# Patient Record
Sex: FEMALE | Race: BLACK OR AFRICAN AMERICAN | Employment: OTHER | URBAN - NONMETROPOLITAN AREA
[De-identification: names, ages, dates, MRNs, and addresses within clinical notes are randomized per-mention and may not be internally consistent; named-entity substitution may affect disease eponyms.]

---

## 2017-01-16 ENCOUNTER — OFFICE VISIT (OUTPATIENT)
Dept: ENDOCRINOLOGY | Age: 57
End: 2017-01-16

## 2017-01-16 VITALS
DIASTOLIC BLOOD PRESSURE: 78 MMHG | RESPIRATION RATE: 18 BRPM | HEART RATE: 100 BPM | WEIGHT: 185.8 LBS | HEIGHT: 66 IN | BODY MASS INDEX: 29.86 KG/M2 | SYSTOLIC BLOOD PRESSURE: 136 MMHG

## 2017-01-16 DIAGNOSIS — E04.2 MULTINODULAR GOITER: Primary | ICD-10-CM

## 2017-01-16 DIAGNOSIS — E11.8 TYPE 2 DIABETES MELLITUS WITH COMPLICATION, WITHOUT LONG-TERM CURRENT USE OF INSULIN (HCC): ICD-10-CM

## 2017-01-16 PROCEDURE — 3014F SCREEN MAMMO DOC REV: CPT | Performed by: CLINICAL NURSE SPECIALIST

## 2017-01-16 PROCEDURE — 3044F HG A1C LEVEL LT 7.0%: CPT | Performed by: CLINICAL NURSE SPECIALIST

## 2017-01-16 PROCEDURE — 99214 OFFICE O/P EST MOD 30 MIN: CPT | Performed by: CLINICAL NURSE SPECIALIST

## 2017-01-16 PROCEDURE — G8598 ASA/ANTIPLAT THER USED: HCPCS | Performed by: CLINICAL NURSE SPECIALIST

## 2017-01-16 PROCEDURE — 4004F PT TOBACCO SCREEN RCVD TLK: CPT | Performed by: CLINICAL NURSE SPECIALIST

## 2017-01-16 PROCEDURE — G8484 FLU IMMUNIZE NO ADMIN: HCPCS | Performed by: CLINICAL NURSE SPECIALIST

## 2017-01-16 PROCEDURE — G8419 CALC BMI OUT NRM PARAM NOF/U: HCPCS | Performed by: CLINICAL NURSE SPECIALIST

## 2017-01-16 PROCEDURE — 3017F COLORECTAL CA SCREEN DOC REV: CPT | Performed by: CLINICAL NURSE SPECIALIST

## 2017-01-16 PROCEDURE — G8427 DOCREV CUR MEDS BY ELIG CLIN: HCPCS | Performed by: CLINICAL NURSE SPECIALIST

## 2017-01-16 ASSESSMENT — ENCOUNTER SYMPTOMS
EYE REDNESS: 0
SHORTNESS OF BREATH: 0
COUGH: 0
BACK PAIN: 1
WHEEZING: 0
NAUSEA: 0
CHEST TIGHTNESS: 0
VOICE CHANGE: 0
DIARRHEA: 0
ABDOMINAL PAIN: 0
CONSTIPATION: 0

## 2017-01-19 ENCOUNTER — TELEPHONE (OUTPATIENT)
Dept: ENDOCRINOLOGY | Age: 57
End: 2017-01-19

## 2017-01-27 ENCOUNTER — TELEPHONE (OUTPATIENT)
Dept: ENDOCRINOLOGY | Age: 57
End: 2017-01-27

## 2017-03-08 ENCOUNTER — OFFICE VISIT (OUTPATIENT)
Dept: ENDOCRINOLOGY | Age: 57
End: 2017-03-08

## 2017-03-08 VITALS
SYSTOLIC BLOOD PRESSURE: 120 MMHG | DIASTOLIC BLOOD PRESSURE: 70 MMHG | HEART RATE: 86 BPM | RESPIRATION RATE: 18 BRPM | WEIGHT: 188.5 LBS | BODY MASS INDEX: 30.29 KG/M2 | HEIGHT: 66 IN

## 2017-03-08 DIAGNOSIS — E04.2 MULTINODULAR GOITER: ICD-10-CM

## 2017-03-08 DIAGNOSIS — I10 ESSENTIAL HYPERTENSION: ICD-10-CM

## 2017-03-08 DIAGNOSIS — E78.00 HIGH CHOLESTEROL: ICD-10-CM

## 2017-03-08 DIAGNOSIS — E11.9 TYPE 2 DIABETES MELLITUS WITHOUT COMPLICATION, WITHOUT LONG-TERM CURRENT USE OF INSULIN (HCC): Primary | ICD-10-CM

## 2017-03-08 PROCEDURE — 99214 OFFICE O/P EST MOD 30 MIN: CPT | Performed by: INTERNAL MEDICINE

## 2017-03-08 PROCEDURE — 3014F SCREEN MAMMO DOC REV: CPT | Performed by: INTERNAL MEDICINE

## 2017-03-08 PROCEDURE — G8598 ASA/ANTIPLAT THER USED: HCPCS | Performed by: INTERNAL MEDICINE

## 2017-03-08 PROCEDURE — G8417 CALC BMI ABV UP PARAM F/U: HCPCS | Performed by: INTERNAL MEDICINE

## 2017-03-08 PROCEDURE — G8427 DOCREV CUR MEDS BY ELIG CLIN: HCPCS | Performed by: INTERNAL MEDICINE

## 2017-03-08 PROCEDURE — G8484 FLU IMMUNIZE NO ADMIN: HCPCS | Performed by: INTERNAL MEDICINE

## 2017-03-08 PROCEDURE — 3044F HG A1C LEVEL LT 7.0%: CPT | Performed by: INTERNAL MEDICINE

## 2017-03-08 PROCEDURE — 4004F PT TOBACCO SCREEN RCVD TLK: CPT | Performed by: INTERNAL MEDICINE

## 2017-03-08 PROCEDURE — 3017F COLORECTAL CA SCREEN DOC REV: CPT | Performed by: INTERNAL MEDICINE

## 2017-03-08 ASSESSMENT — ENCOUNTER SYMPTOMS: BLURRED VISION: 0

## 2017-05-23 ENCOUNTER — OFFICE VISIT (OUTPATIENT)
Dept: CARDIOLOGY | Age: 57
End: 2017-05-23

## 2017-05-23 VITALS
HEIGHT: 66 IN | HEART RATE: 99 BPM | SYSTOLIC BLOOD PRESSURE: 130 MMHG | BODY MASS INDEX: 29.81 KG/M2 | WEIGHT: 185.5 LBS | DIASTOLIC BLOOD PRESSURE: 92 MMHG

## 2017-05-23 DIAGNOSIS — R07.89 ATYPICAL CHEST PAIN: ICD-10-CM

## 2017-05-23 DIAGNOSIS — R06.02 SOB (SHORTNESS OF BREATH): Primary | ICD-10-CM

## 2017-05-23 PROCEDURE — G8417 CALC BMI ABV UP PARAM F/U: HCPCS | Performed by: INTERNAL MEDICINE

## 2017-05-23 PROCEDURE — G8598 ASA/ANTIPLAT THER USED: HCPCS | Performed by: INTERNAL MEDICINE

## 2017-05-23 PROCEDURE — 4004F PT TOBACCO SCREEN RCVD TLK: CPT | Performed by: INTERNAL MEDICINE

## 2017-05-23 PROCEDURE — 3014F SCREEN MAMMO DOC REV: CPT | Performed by: INTERNAL MEDICINE

## 2017-05-23 PROCEDURE — 93000 ELECTROCARDIOGRAM COMPLETE: CPT | Performed by: INTERNAL MEDICINE

## 2017-05-23 PROCEDURE — G8428 CUR MEDS NOT DOCUMENT: HCPCS | Performed by: INTERNAL MEDICINE

## 2017-05-23 PROCEDURE — 3017F COLORECTAL CA SCREEN DOC REV: CPT | Performed by: INTERNAL MEDICINE

## 2017-05-23 PROCEDURE — 99213 OFFICE O/P EST LOW 20 MIN: CPT | Performed by: INTERNAL MEDICINE

## 2017-05-23 RX ORDER — CYCLOSPORINE 0.5 MG/ML
1 EMULSION OPHTHALMIC 2 TIMES DAILY
COMMUNITY
End: 2019-07-16 | Stop reason: ALTCHOICE

## 2017-09-18 ENCOUNTER — OFFICE VISIT (OUTPATIENT)
Dept: ENDOCRINOLOGY | Age: 57
End: 2017-09-18
Payer: MEDICARE

## 2017-09-18 VITALS
BODY MASS INDEX: 30.02 KG/M2 | HEART RATE: 89 BPM | SYSTOLIC BLOOD PRESSURE: 126 MMHG | RESPIRATION RATE: 16 BRPM | WEIGHT: 186.8 LBS | DIASTOLIC BLOOD PRESSURE: 59 MMHG | HEIGHT: 66 IN

## 2017-09-18 DIAGNOSIS — E04.2 MULTINODULAR GOITER: ICD-10-CM

## 2017-09-18 DIAGNOSIS — E78.2 MIXED HYPERLIPIDEMIA: ICD-10-CM

## 2017-09-18 DIAGNOSIS — E11.8 TYPE 2 DIABETES MELLITUS WITH COMPLICATION, WITHOUT LONG-TERM CURRENT USE OF INSULIN (HCC): Primary | ICD-10-CM

## 2017-09-18 PROCEDURE — G8417 CALC BMI ABV UP PARAM F/U: HCPCS | Performed by: CLINICAL NURSE SPECIALIST

## 2017-09-18 PROCEDURE — 3017F COLORECTAL CA SCREEN DOC REV: CPT | Performed by: CLINICAL NURSE SPECIALIST

## 2017-09-18 PROCEDURE — 3014F SCREEN MAMMO DOC REV: CPT | Performed by: CLINICAL NURSE SPECIALIST

## 2017-09-18 PROCEDURE — 4004F PT TOBACCO SCREEN RCVD TLK: CPT | Performed by: CLINICAL NURSE SPECIALIST

## 2017-09-18 PROCEDURE — G8598 ASA/ANTIPLAT THER USED: HCPCS | Performed by: CLINICAL NURSE SPECIALIST

## 2017-09-18 PROCEDURE — 99213 OFFICE O/P EST LOW 20 MIN: CPT | Performed by: CLINICAL NURSE SPECIALIST

## 2017-09-18 PROCEDURE — G8427 DOCREV CUR MEDS BY ELIG CLIN: HCPCS | Performed by: CLINICAL NURSE SPECIALIST

## 2017-09-18 PROCEDURE — 3046F HEMOGLOBIN A1C LEVEL >9.0%: CPT | Performed by: CLINICAL NURSE SPECIALIST

## 2017-09-18 ASSESSMENT — ENCOUNTER SYMPTOMS
CHEST TIGHTNESS: 0
NAUSEA: 0
DIARRHEA: 0
VOICE CHANGE: 0
SHORTNESS OF BREATH: 0
TROUBLE SWALLOWING: 1
ABDOMINAL PAIN: 0
WHEEZING: 0
COUGH: 0
CONSTIPATION: 0
EYE REDNESS: 0

## 2017-09-27 ENCOUNTER — HOSPITAL ENCOUNTER (OUTPATIENT)
Age: 57
Discharge: HOME | End: 2017-09-27
Payer: MEDICARE

## 2017-09-27 ENCOUNTER — HOSPITAL ENCOUNTER (OUTPATIENT)
Dept: GENERAL RADIOLOGY | Age: 57
Discharge: HOME | End: 2017-09-27
Payer: MEDICARE

## 2017-09-27 DIAGNOSIS — S83.222S: ICD-10-CM

## 2017-09-27 PROCEDURE — 73564 X-RAY EXAM KNEE 4 OR MORE: CPT

## 2017-10-24 ENCOUNTER — APPOINTMENT (OUTPATIENT)
Dept: CT IMAGING | Age: 57
End: 2017-10-24
Payer: MEDICARE

## 2017-10-24 ENCOUNTER — HOSPITAL ENCOUNTER (EMERGENCY)
Age: 57
Discharge: HOME | End: 2017-10-25
Attending: FAMILY MEDICINE
Payer: MEDICARE

## 2017-10-24 ENCOUNTER — NURSE TRIAGE (OUTPATIENT)
Dept: ADMINISTRATIVE | Age: 57
End: 2017-10-24

## 2017-10-24 DIAGNOSIS — R04.0 EPISTAXIS: Primary | ICD-10-CM

## 2017-10-24 LAB
BASOPHILS # BLD: 1.1 %
BASOPHILS ABSOLUTE: 0.1 THOU/MM3 (ref 0–0.1)
EOSINOPHIL # BLD: 3.3 %
EOSINOPHILS ABSOLUTE: 0.4 THOU/MM3 (ref 0–0.4)
HCT VFR BLD CALC: 42.4 % (ref 37–47)
HEMOGLOBIN: 14.5 GM/DL (ref 12–16)
LYMPHOCYTES # BLD: 30.8 %
LYMPHOCYTES ABSOLUTE: 3.5 THOU/MM3 (ref 1–4.8)
MCH RBC QN AUTO: 31.9 PG (ref 27–31)
MCHC RBC AUTO-ENTMCNC: 34.2 GM/DL (ref 33–37)
MCV RBC AUTO: 93.1 FL (ref 81–99)
MONOCYTES # BLD: 8.5 %
MONOCYTES ABSOLUTE: 1 THOU/MM3 (ref 0.4–1.3)
NUCLEATED RED BLOOD CELLS: 0 /100 WBC
PDW BLD-RTO: 12.8 % (ref 11.5–14.5)
PLATELET # BLD: 267 THOU/MM3 (ref 130–400)
PMV BLD AUTO: 8.9 MCM (ref 7.4–10.4)
RBC # BLD: 4.56 MILL/MM3 (ref 4.2–5.4)
SEG NEUTROPHILS: 56.3 %
SEGMENTED NEUTROPHILS ABSOLUTE COUNT: 6.5 THOU/MM3 (ref 1.8–7.7)
WBC # BLD: 11.5 THOU/MM3 (ref 4.8–10.8)

## 2017-10-24 PROCEDURE — 36415 COLL VENOUS BLD VENIPUNCTURE: CPT

## 2017-10-24 PROCEDURE — 6370000000 HC RX 637 (ALT 250 FOR IP): Performed by: FAMILY MEDICINE

## 2017-10-24 PROCEDURE — 70450 CT HEAD/BRAIN W/O DYE: CPT

## 2017-10-24 PROCEDURE — 85025 COMPLETE CBC W/AUTO DIFF WBC: CPT

## 2017-10-24 PROCEDURE — 30901 CONTROL OF NOSEBLEED: CPT

## 2017-10-24 PROCEDURE — 99284 EMERGENCY DEPT VISIT MOD MDM: CPT

## 2017-10-24 RX ORDER — CLONIDINE HYDROCHLORIDE 0.1 MG/1
0.1 TABLET ORAL ONCE
Status: DISCONTINUED | OUTPATIENT
Start: 2017-10-24 | End: 2017-10-25 | Stop reason: HOSPADM

## 2017-10-24 RX ADMIN — PHENYLEPHRINE HYDROCHLORIDE 1 SPRAY: 1 SPRAY NASAL at 23:41

## 2017-10-24 ASSESSMENT — ENCOUNTER SYMPTOMS
DIARRHEA: 0
BACK PAIN: 0
SINUS PAIN: 0
SINUS PRESSURE: 0
SHORTNESS OF BREATH: 0
VOMITING: 0
PHOTOPHOBIA: 0
RHINORRHEA: 1
NAUSEA: 0
CHEST TIGHTNESS: 0

## 2017-10-24 ASSESSMENT — PAIN DESCRIPTION - FREQUENCY
FREQUENCY: OTHER (COMMENT)
FREQUENCY: CONTINUOUS

## 2017-10-24 ASSESSMENT — PAIN DESCRIPTION - ONSET: ONSET: ON-GOING

## 2017-10-24 ASSESSMENT — PAIN DESCRIPTION - DESCRIPTORS
DESCRIPTORS: CONSTANT;ACHING;DULL
DESCRIPTORS: DISCOMFORT

## 2017-10-24 ASSESSMENT — PAIN DESCRIPTION - PROGRESSION: CLINICAL_PROGRESSION: NOT CHANGED

## 2017-10-24 ASSESSMENT — PAIN DESCRIPTION - PAIN TYPE
TYPE: ACUTE PAIN
TYPE: ACUTE PAIN

## 2017-10-24 ASSESSMENT — PAIN DESCRIPTION - LOCATION
LOCATION: NOSE
LOCATION: HEAD

## 2017-10-24 ASSESSMENT — PAIN DESCRIPTION - ORIENTATION: ORIENTATION: RIGHT

## 2017-10-24 ASSESSMENT — PAIN SCALES - GENERAL
PAINLEVEL_OUTOF10: 6
PAINLEVEL_OUTOF10: 5

## 2017-10-25 VITALS
WEIGHT: 185 LBS | RESPIRATION RATE: 16 BRPM | SYSTOLIC BLOOD PRESSURE: 141 MMHG | HEART RATE: 75 BPM | HEIGHT: 66 IN | BODY MASS INDEX: 29.73 KG/M2 | DIASTOLIC BLOOD PRESSURE: 68 MMHG | TEMPERATURE: 97.6 F | OXYGEN SATURATION: 96 %

## 2017-10-25 ASSESSMENT — PAIN DESCRIPTION - ORIENTATION
ORIENTATION: RIGHT
ORIENTATION: RIGHT

## 2017-10-25 ASSESSMENT — PAIN DESCRIPTION - ONSET
ONSET: ON-GOING
ONSET: ON-GOING

## 2017-10-25 ASSESSMENT — PAIN DESCRIPTION - DESCRIPTORS
DESCRIPTORS: DISCOMFORT
DESCRIPTORS: DISCOMFORT

## 2017-10-25 ASSESSMENT — PAIN DESCRIPTION - PAIN TYPE
TYPE: ACUTE PAIN
TYPE: ACUTE PAIN

## 2017-10-25 ASSESSMENT — PAIN DESCRIPTION - LOCATION
LOCATION: NOSE
LOCATION: NOSE

## 2017-10-25 ASSESSMENT — PAIN DESCRIPTION - PROGRESSION
CLINICAL_PROGRESSION: NOT CHANGED
CLINICAL_PROGRESSION: GRADUALLY IMPROVING

## 2017-10-25 ASSESSMENT — PAIN DESCRIPTION - FREQUENCY: FREQUENCY: CONTINUOUS

## 2017-10-25 ASSESSMENT — PAIN SCALES - GENERAL
PAINLEVEL_OUTOF10: 5
PAINLEVEL_OUTOF10: 3

## 2017-10-25 NOTE — ED NOTES
Dr. Segovia Lias leaving room when I entered. Pt up walking around room to make sure her nose wont start bleeding once she gets up and moves around. Pt states she is ready to get home and get some sleep. Dr. Segovia Lias back in room to reassess epistaxis and confirm plan for discharge.       Gin Ying RN  10/25/17 0554

## 2017-10-25 NOTE — ED PROVIDER NOTES
[facility]  eMERGENCY dEPARTMENT eNCOUnter          CHIEF COMPLAINT       Chief Complaint   Patient presents with    Epistaxis       Nurses Notes reviewed and I agree except as noted in the HPI. HISTORY OF PRESENT ILLNESS    Lio Hernandez is a 61 y.o. female who presents to the Emergency Department for the evaluation of epistaxis. The patient reports that her first episode of epistaxis occurred yesterday without known injury and resolved on its own. Patient then developed another nosebleed approximately seven hours PTA but was able to stop it with sticking tissue coated in Vaseline in her nostrils. She later developed another nose bleed that temporarily went away with placing her head between her knees with her nose pinched but started bleeding again as she was walking around getting ready to go to sleep. Upon arrival to the ED, the patient's nosebleed has resolved. Patient complains of a diffuse headache. Her pain is described as constant throbbing that she currently rates as a 5/10 in severity without modifying factors. She adds that she has seasonal allergies and has had rhinorrhea for the past week. The patient denies any dizziness, lightheadedness, congestion, ear pain or discharge, or sinus pressure. Patient denies any similar episodes of epistaxis in the past. No further complaints at initial time of encounter. The HPI was provided by the patient. REVIEW OF SYSTEMS     Review of Systems   Constitutional: Negative for chills, fatigue and fever. HENT: Positive for nosebleeds and rhinorrhea. Negative for congestion, ear discharge, ear pain, hearing loss, sinus pain, sinus pressure and tinnitus. Eyes: Negative for photophobia and visual disturbance. Respiratory: Negative for chest tightness and shortness of breath. Cardiovascular: Negative for chest pain. Gastrointestinal: Negative for diarrhea, nausea and vomiting.    Musculoskeletal: Negative for back pain, neck pain and neck stiffness. Allergic/Immunologic: Positive for environmental allergies. Negative for immunocompromised state. Neurological: Positive for headaches. Negative for dizziness, seizures, syncope, weakness, light-headedness and numbness. Hematological: Does not bruise/bleed easily. Psychiatric/Behavioral: Negative for confusion. PAST MEDICAL HISTORY    has a past medical history of Arthritis; Asthma; Atypical chest pain; Bell's palsy; COPD (chronic obstructive pulmonary disease) ([facility] [address] Costochondritis; Diabetes mellitus ([facility] [address] Fatigue; FH: CAD (coronary artery disease); High blood pressure; High cholesterol; Joint pain; Nonobstructive CAD (coronary artery disease); Pseudoaneurysm ([facility] [address] Tobacco abuse; and Wheezing. SURGICAL HISTORY      has a past surgical history that includes partial hysterectomy (cervix not removed) (1999); Rotator cuff repair (Bilateral, 2007); cardiovascular stress test (10 02 2009); doppler echocardiography (10 02 2009); Diagnostic Cardiac Cath Lab Procedure (10 12 2009); and back surgery (12/05/2016). CURRENT MEDICATIONS       Discharge Medication List as of 10/25/2017  2:10 AM      CONTINUE these medications which have NOT CHANGED    Details   glucose blood VI test strips (FREESTYLE LITE) strip Disp-90 strip, R-1, NormalTesting once daily      MELOXICAM PO Take by mouth dailyHistorical Med      cycloSPORINE (RESTASIS) 0.05 % ophthalmic emulsion 1 drop 2 times dailyHistorical Med      metoprolol (TOPROL-XL) 25 MG XL tablet Take 25 mg by mouth See Admin Instructions Take 1/2 in the morning and 1 1/2 at night      Potassium (POTASSIMIN PO) Take 10 mEq by mouth daily. tramadol (ULTRAM) 50 MG tablet Take 50 mg by mouth every 6 hours as needed. amlodipine (NORVASC) 5 MG tablet Take 5 mg by mouth daily. sitagliptan-metformin (JANUMET)  MG per tablet Take 1 tablet by mouth 2 times daily.         pravastatin (PRAVACHOL) 20 MG tablet Take 20 mg by mouth daily displays no seizure activity. GCS eye subscore is 4. GCS verbal subscore is 5. GCS motor subscore is 6. Skin: Skin is warm and dry. No rash noted. She is not diaphoretic. Psychiatric: She has a normal mood and affect. Her behavior is normal. Thought content normal.       DIFFERENTIAL DIAGNOSIS:   Epistaxis related to sinusitis or allergies    DIAGNOSTIC RESULTS     EKG: All EKG's are interpreted by the Emergency Department Physician who either signs or Co-signs this chart in the absence of a cardiologist.  None    RADIOLOGY: non-plain film images(s) such as CT, Ultrasound and MRI are read by the radiologist.  [address]   Final Result   No mass effect or acute hemorrhage. **This report has been created using voice recognition software. It may contain minor errors which are inherent in voice recognition technology. **      Final report electronically signed by Dr. Vipin Lamb on 10/24/2017 11:46 PM          LABS:   Labs Reviewed   CBC WITH AUTO DIFFERENTIAL - Abnormal; Notable for the following:        Result Value    WBC 11.5 (*)     MCH 31.9 (*)     All other components within normal limits       EMERGENCY DEPARTMENT COURSE:   Vitals:    Vitals:    10/24/17 2342 10/24/17 2343 10/25/17 0118 10/25/17 0211   BP: 119/82 119/82 (!) 143/71 (!) 141/68   Pulse:  84 78 75   Resp:  14 14 16   Temp:       TempSrc:   Oral    SpO2:  96% 98% 96%   Weight:       Height:         10:48 PM: The patient was seen and evaluated. Patient seen and evaluated for epistaxis of the right nares. I did cauterize and apply Moose-Synephrine. At this time there is no more active bleeding. Patient blood pressure was controlled. Screening labs and imaging studies are reassuring  Discharge home with ENT follow-up. CRITICAL CARE:   None.      CONSULTS:  none    PROCEDURES:  Using silver nitrate cauterized right nares    FINAL IMPRESSION      1. Epistaxis          DISPOSITION/PLAN   The patient was Discharged    PATIENT REFERRED TO:  Lizzie Mckeon MD  [address] [facility] [address] [place]  [phone]    Schedule an appointment as soon as possible for a visit today        DISCHARGE MEDICATIONS:  Discharge Medication List as of 10/25/2017  2:10 AM          (Please note that portions of this note were completed with a voice recognition program.  Efforts were made to edit the dictations but occasionally words are mis-transcribed.)    Scribe:  [address] 10/25/17 10:48 PM Scribing for and in the presence of Minnie Abbott MD.    Signed by: [address], Vel, 10/24/17 2:38 AM    Provider:  I personally performed the services described in the documentation, reviewed and edited the documentation which was dictated to the scribe in my presence, and it accurately records my words and actions.     Minnie Abbott MD 10/25/17 2:38 AM                Minnie Abbott MD  10/25/17 9818

## 2017-10-25 NOTE — ED TRIAGE NOTES
Pt presents in ED with intermittent nosebleed that began yesterday. Pt states she was able to get it stopped last night but it started again this evening at 1500. Pt states she started cleaning and her nose began to bleed. Pt currently does not have an active nosebleed but does have a tissue with a small amount of blood present. VS and assessment competed on arrival. Will continue to monitor.

## 2017-10-26 ENCOUNTER — OFFICE VISIT (OUTPATIENT)
Dept: ENT CLINIC | Age: 57
End: 2017-10-26
Payer: MEDICARE

## 2017-10-26 VITALS
HEIGHT: 66 IN | BODY MASS INDEX: 29.97 KG/M2 | SYSTOLIC BLOOD PRESSURE: 134 MMHG | TEMPERATURE: 97.6 F | HEART RATE: 88 BPM | DIASTOLIC BLOOD PRESSURE: 70 MMHG | WEIGHT: 186.5 LBS | RESPIRATION RATE: 14 BRPM

## 2017-10-26 DIAGNOSIS — R04.0 EPISTAXIS: Primary | ICD-10-CM

## 2017-10-26 PROCEDURE — 3014F SCREEN MAMMO DOC REV: CPT | Performed by: PHYSICIAN ASSISTANT

## 2017-10-26 PROCEDURE — 3017F COLORECTAL CA SCREEN DOC REV: CPT | Performed by: PHYSICIAN ASSISTANT

## 2017-10-26 PROCEDURE — G8484 FLU IMMUNIZE NO ADMIN: HCPCS | Performed by: PHYSICIAN ASSISTANT

## 2017-10-26 PROCEDURE — G8427 DOCREV CUR MEDS BY ELIG CLIN: HCPCS | Performed by: PHYSICIAN ASSISTANT

## 2017-10-26 PROCEDURE — 30901 CONTROL OF NOSEBLEED: CPT | Performed by: PHYSICIAN ASSISTANT

## 2017-10-26 PROCEDURE — G8598 ASA/ANTIPLAT THER USED: HCPCS | Performed by: PHYSICIAN ASSISTANT

## 2017-10-26 PROCEDURE — G8417 CALC BMI ABV UP PARAM F/U: HCPCS | Performed by: PHYSICIAN ASSISTANT

## 2017-10-26 PROCEDURE — 99203 OFFICE O/P NEW LOW 30 MIN: CPT | Performed by: PHYSICIAN ASSISTANT

## 2017-10-26 PROCEDURE — 4004F PT TOBACCO SCREEN RCVD TLK: CPT | Performed by: PHYSICIAN ASSISTANT

## 2017-10-26 ASSESSMENT — ENCOUNTER SYMPTOMS
EYE ITCHING: 0
SORE THROAT: 0
CHEST TIGHTNESS: 0
APNEA: 0
PHOTOPHOBIA: 0
CHOKING: 0
VOICE CHANGE: 0
CONSTIPATION: 0
VOMITING: 0
ABDOMINAL DISTENTION: 0
EYE REDNESS: 0
SHORTNESS OF BREATH: 0
FACIAL SWELLING: 0
WHEEZING: 1
BACK PAIN: 1
SINUS PRESSURE: 0
ANAL BLEEDING: 0
DIARRHEA: 0
TROUBLE SWALLOWING: 0
EYE DISCHARGE: 0
COUGH: 0
COLOR CHANGE: 0
RECTAL PAIN: 0
EYE PAIN: 0
RHINORRHEA: 0
ABDOMINAL PAIN: 0
STRIDOR: 0
NAUSEA: 0
BLOOD IN STOOL: 0

## 2017-10-26 NOTE — PROGRESS NOTES
[address] [facility]  [address] [facility]  [address]  Dept: [phone]  Dept Fax: [phone]  Loc: [phone]      Gretchen Pulido is a 61 y.o. female who was referred by No ref. provider found for:  Chief Complaint   Patient presents with    Epistaxis     New patient is here for epistaxis seen in the ER. pt stated she had a nosebleed this morning   . HPI:     Clemente Teran presents to the clinic with complaints of a nosebleed that brought her to the ER 3 days ago. She has never had nosebleeds in the past. She takes 81mg ASA daily for angina. She did have a right sided cauterization in the ER. No packing was needed a the time. She has had one small nose bleed since her ER visit. This occurred this morning and she was able to get it to stop on her own. She has had URI symptoms over the last few weeks. Patient Active Problem List   Diagnosis    Nonobstructive CAD (coronary artery disease)    Asthma    Diabetes mellitus (HCC)    High cholesterol    High blood pressure    Wheezing    Fatigue    Joint pain    Arthritis    FH: CAD (coronary artery disease)    Tobacco abuse    Pseudoaneurysm (HCC)    Atypical chest pain    Costochondritis    Bell's palsy    Neck pain    Thyroid nodule    Nicotine abuse    SOB (shortness of breath)    Preop cardiovascular exam    Multinodular goiter     Allergies   Allergen Reactions    Morphine      Past Medical History:   Diagnosis Date    Arthritis     Asthma     Atypical chest pain     Atypical chest discomfort in hospital multiple times with stress test being normal.     Bell's palsy     COPD (chronic obstructive pulmonary disease) (HCC)     Costochondritis     Diabetes mellitus (HCC)     Fatigue     FH: CAD (coronary artery disease)     Strong family history of CAD.     High blood pressure     High cholesterol     Joint pain     Nonobstructive CAD (coronary artery disease)     Pseudoaneurysm ([address] Patient developed small pseudoaneurysm in right groin requiring thrombin injection by Dr. Tanner Chino which was successful.  Tobacco abuse     Tobacco abuse on Habitrol patch.  Wheezing      Past Surgical History:   Procedure Laterality Date    BACK SURGERY  12/05/2016    CARDIOVASCULAR STRESS TEST  10 02 2009    Gated SPECT LV function demonstrated normal thickening, normal contractility, normal wall motion, EF of 46% which is low normal; however, visualized by myself appears to be 55%. No evidence of prior transmural MI or ischemia. Excellend treadmill exercise. Completed 10 METS, functional class I. No evidence of ischemic -induced EKG changes are noted. Appropriate hemodynamic response to exercise.  DIAGNOSTIC CARDIAC CATH LAB PROCEDURE  10 12 2009    LV demonstrates normal systolic function, EF 16%. No critical lesions are noted in all larger pericardial vessels & show that left main is widely patent. Circumflex is widely patent with anterior marginal one & two being widely patent. Left anterior descending artery is widely patent, diagonal one large caliber vessel, widely patent. Right coronary artery is widely patent & is dominant.  DOPPLER ECHOCARDIOGRAPHY  10 02 2009    LV demonstrated normal thickening, normal contractility, normal motion with mild systolic dilatation of the ventricle noted. EF 60%. Left atrium is mildly dilated. There is stage I diastolic dysfunction.  PARTIAL HYSTERECTOMY  1999    ROTATOR CUFF REPAIR Bilateral 2007         Subjective:      Review of Systems   Constitutional: Negative for activity change, appetite change, chills, diaphoresis, fatigue, fever and unexpected weight change. HENT: Positive for nosebleeds and postnasal drip. Negative for congestion, dental problem, drooling, ear discharge, ear pain, facial swelling, hearing loss, mouth sores, rhinorrhea, sinus pressure, sneezing, sore throat, tinnitus, trouble swallowing and voice change.     Eyes: Negative for photophobia, pain, discharge, redness, itching and visual disturbance. Respiratory: Positive for wheezing. Negative for apnea, cough, choking, chest tightness, shortness of breath and stridor. Cardiovascular: Negative for chest pain, palpitations and leg swelling. Gastrointestinal: Negative for abdominal distention, abdominal pain, anal bleeding, blood in stool, constipation, diarrhea, nausea, rectal pain and vomiting. Endocrine: Negative for cold intolerance, heat intolerance, polydipsia, polyphagia and polyuria. Genitourinary: Negative for decreased urine volume, difficulty urinating, dyspareunia, dysuria, enuresis, flank pain, frequency, genital sores, hematuria, menstrual problem, pelvic pain, urgency, vaginal bleeding, vaginal discharge and vaginal pain. Musculoskeletal: Positive for arthralgias, back pain and gait problem. Negative for joint swelling, myalgias, neck pain and neck stiffness. Skin: Negative for color change, pallor, rash and wound. Allergic/Immunologic: Negative for environmental allergies, food allergies and immunocompromised state. Neurological: Negative for dizziness, tremors, seizures, syncope, facial asymmetry, speech difficulty, weakness, light-headedness, numbness and headaches. Hematological: Negative for adenopathy. Does not bruise/bleed easily. Psychiatric/Behavioral: Negative for agitation, behavioral problems, confusion, decreased concentration, dysphoric mood, hallucinations, self-injury, sleep disturbance and suicidal ideas. The patient is not nervous/anxious and is not hyperactive. Objective:     Vitals:    10/26/17 1257   BP: 134/70   Site: Left Arm   Position: Sitting   Pulse: 88   Resp: 14   Temp: 97.6 °F (36.4 °C)   TempSrc: Oral   Weight: 186 lb 8 oz (84.6 kg)   Height: 5' 6\" (1.676 m)       Physical Exam   Physical Exam    Head is normocephalic. DANIEL, EOM full, no diplopia, no nystagmus. Conjunctivae pink, moist, no discharge.     Pinnae are WNL  R External auditory canal clear and free of any pathology  L External auditory canal clear and free of any pathology   Tympanic membranes:  R normal  L normal    Nasal bones: intact  Septum:  Small anterior vessel requiring cauterization. Area localized and cauterized without complication. Mucosa: bleeding  Turbinates: Right inferior turbinate erythematous and bleeding. Cauterized as well   Discharge: blood tinged    Lips, tongue and oral cavity show tongue is midline, mobile, no lesions. Dentition: good, no malocclusion  Oral mucosa: normal  Tonsils: normal  Oropharynx: normal-appearing mucosa and no pharyngitis, no exudate  Uvula midline. Gag reflex present  Nasopharynx: not seen    No facial redness, swelling or tenderness. Salivary glands not enlarged. Neck supple  Cervical adenopathy: no palpable lymphadenopathy    Trachea midline  Thyroid not enlarged, no palpable nodules or masses    Chest equal and symmetrical expansion, no retractions    Extremities: no clubbing, cyanosis or edema  Gait steady  Skin: normal exposed surfaces  Cranial nerves grossly intact      Data:  All of the past medical history, past surgical history, family history, social history, allergies and current medications were reviewed. Assessment:   Assessment   1. Epistaxis  48035 - RI CTRL NOSEBLEED,ANTER,SIMPLE          Plan:      PROCEDURE: CONTROL ANTERIOR EPISTAXIS    After an adequate level of topical anesthesia was obtained using a pledget impregnated with Afrin and Lidoocaine, Silver Nitrate cautery was applied to the bleeding point on the Right side. Excess Silver Nitrate was removed with a moistened Q-tip. No packing was needed. Patient tolerated the procedure well. She had 2 areas that were bleeding; the right inferior turbinate and the right anterior septum. She had no bleeding after cauterization. I discussed to heavy, lifting, blowing nose, and sticking nothing in the nose. She verbalized understanding. She will apply Bactroban to the right nare using a Q-tip 2x daily until I see her back in 3 weeks time. She will discuss with Dr. Oliva Chen if she can be off of her ASA. Medications Ordered:  Orders Placed This Encounter   Medications    mupirocin (BACTROBAN) 2 % ointment     Sig: Apply 3 times daily. Dispense:  1 Tube     Refill:  0       Orders Placed:  Orders Placed This Encounter   Procedures    99570 - CO CTRL Elina Anthony       Return in about 3 weeks (around 11/16/2017) for follow-up.          Electronically signed by RIGOBERTO Gordon on 10/26/2017 at 1:32 PM

## 2017-10-27 ENCOUNTER — TELEPHONE (OUTPATIENT)
Dept: CARDIOLOGY CLINIC | Age: 57
End: 2017-10-27

## 2017-11-16 ENCOUNTER — OFFICE VISIT (OUTPATIENT)
Dept: ENT CLINIC | Age: 57
End: 2017-11-16
Payer: MEDICARE

## 2017-11-16 VITALS
HEIGHT: 66 IN | HEART RATE: 80 BPM | RESPIRATION RATE: 14 BRPM | DIASTOLIC BLOOD PRESSURE: 86 MMHG | WEIGHT: 187 LBS | SYSTOLIC BLOOD PRESSURE: 140 MMHG | TEMPERATURE: 98.7 F | BODY MASS INDEX: 30.05 KG/M2

## 2017-11-16 DIAGNOSIS — R04.0 EPISTAXIS: Primary | ICD-10-CM

## 2017-11-16 PROCEDURE — G8484 FLU IMMUNIZE NO ADMIN: HCPCS | Performed by: PHYSICIAN ASSISTANT

## 2017-11-16 PROCEDURE — 3014F SCREEN MAMMO DOC REV: CPT | Performed by: PHYSICIAN ASSISTANT

## 2017-11-16 PROCEDURE — G8598 ASA/ANTIPLAT THER USED: HCPCS | Performed by: PHYSICIAN ASSISTANT

## 2017-11-16 PROCEDURE — G8417 CALC BMI ABV UP PARAM F/U: HCPCS | Performed by: PHYSICIAN ASSISTANT

## 2017-11-16 PROCEDURE — 3017F COLORECTAL CA SCREEN DOC REV: CPT | Performed by: PHYSICIAN ASSISTANT

## 2017-11-16 PROCEDURE — 99213 OFFICE O/P EST LOW 20 MIN: CPT | Performed by: PHYSICIAN ASSISTANT

## 2017-11-16 PROCEDURE — 4004F PT TOBACCO SCREEN RCVD TLK: CPT | Performed by: PHYSICIAN ASSISTANT

## 2017-11-16 PROCEDURE — G8427 DOCREV CUR MEDS BY ELIG CLIN: HCPCS | Performed by: PHYSICIAN ASSISTANT

## 2017-11-16 ASSESSMENT — ENCOUNTER SYMPTOMS
VOICE CHANGE: 0
BLOOD IN STOOL: 0
FACIAL SWELLING: 0
ABDOMINAL DISTENTION: 0
BACK PAIN: 0
DIARRHEA: 0
APNEA: 0
RHINORRHEA: 1
RECTAL PAIN: 0
SHORTNESS OF BREATH: 0
PHOTOPHOBIA: 0
EYE PAIN: 0
COLOR CHANGE: 0
WHEEZING: 0
NAUSEA: 0
SORE THROAT: 0
TROUBLE SWALLOWING: 0
VOMITING: 0
SINUS PRESSURE: 0
EYE DISCHARGE: 0
EYE ITCHING: 0
CHEST TIGHTNESS: 0
COUGH: 0
CHOKING: 0
CONSTIPATION: 0
ABDOMINAL PAIN: 0
ANAL BLEEDING: 0
STRIDOR: 0
EYE REDNESS: 0

## 2017-11-16 NOTE — PROGRESS NOTES
Past Surgical History:   Procedure Laterality Date    BACK SURGERY  12/05/2016    CARDIOVASCULAR STRESS TEST  10 02 2009    Gated SPECT LV function demonstrated normal thickening, normal contractility, normal wall motion, EF of 46% which is low normal; however, visualized by myself appears to be 55%. No evidence of prior transmural MI or ischemia. Excellend treadmill exercise. Completed 10 METS, functional class I. No evidence of ischemic -induced EKG changes are noted. Appropriate hemodynamic response to exercise.  DIAGNOSTIC CARDIAC CATH LAB PROCEDURE  10 12 2009    LV demonstrates normal systolic function, EF 80%. No critical lesions are noted in all larger pericardial vessels & show that left main is widely patent. Circumflex is widely patent with anterior marginal one & two being widely patent. Left anterior descending artery is widely patent, diagonal one large caliber vessel, widely patent. Right coronary artery is widely patent & is dominant.  DOPPLER ECHOCARDIOGRAPHY  10 02 2009    LV demonstrated normal thickening, normal contractility, normal motion with mild systolic dilatation of the ventricle noted. EF 60%. Left atrium is mildly dilated. There is stage I diastolic dysfunction.  PARTIAL HYSTERECTOMY  1999    ROTATOR CUFF REPAIR Bilateral 2007         Subjective:      Review of Systems   Constitutional: Negative for activity change, appetite change, chills, diaphoresis, fatigue, fever and unexpected weight change. HENT: Positive for congestion and rhinorrhea. Negative for dental problem, drooling, ear discharge, ear pain, facial swelling, hearing loss, mouth sores, nosebleeds, postnasal drip, sinus pressure, sneezing, sore throat, tinnitus, trouble swallowing and voice change. Eyes: Negative for photophobia, pain, discharge, redness, itching and visual disturbance. Respiratory: Negative for apnea, cough, choking, chest tightness, shortness of breath, wheezing and stridor.

## 2017-12-02 ENCOUNTER — HOSPITAL ENCOUNTER (EMERGENCY)
Age: 57
Discharge: HOME | End: 2017-12-02
Attending: FAMILY MEDICINE
Payer: MEDICARE

## 2017-12-02 VITALS
SYSTOLIC BLOOD PRESSURE: 128 MMHG | HEART RATE: 95 BPM | HEIGHT: 66 IN | OXYGEN SATURATION: 94 % | DIASTOLIC BLOOD PRESSURE: 86 MMHG | RESPIRATION RATE: 18 BRPM | WEIGHT: 185 LBS | TEMPERATURE: 97.9 F | BODY MASS INDEX: 29.73 KG/M2

## 2017-12-02 DIAGNOSIS — J44.1 COPD EXACERBATION (HCC): Primary | ICD-10-CM

## 2017-12-02 PROCEDURE — 99283 EMERGENCY DEPT VISIT LOW MDM: CPT

## 2017-12-02 PROCEDURE — 6370000000 HC RX 637 (ALT 250 FOR IP): Performed by: FAMILY MEDICINE

## 2017-12-02 PROCEDURE — 94640 AIRWAY INHALATION TREATMENT: CPT

## 2017-12-02 RX ORDER — GUAIFENESIN AND CODEINE PHOSPHATE 100; 10 MG/5ML; MG/5ML
5 SOLUTION ORAL NIGHTLY PRN
Qty: 118 ML | Refills: 0 | Status: SHIPPED | OUTPATIENT
Start: 2017-12-02 | End: 2018-03-07 | Stop reason: ALTCHOICE

## 2017-12-02 RX ORDER — BENZONATATE 100 MG/1
100 CAPSULE ORAL 3 TIMES DAILY PRN
Qty: 30 CAPSULE | Refills: 0 | Status: SHIPPED | OUTPATIENT
Start: 2017-12-02 | End: 2017-12-09

## 2017-12-02 RX ORDER — AZITHROMYCIN 250 MG/1
TABLET, FILM COATED ORAL
Qty: 1 PACKET | Refills: 0 | Status: SHIPPED | OUTPATIENT
Start: 2017-12-02 | End: 2017-12-12

## 2017-12-02 RX ORDER — IPRATROPIUM BROMIDE AND ALBUTEROL SULFATE 2.5; .5 MG/3ML; MG/3ML
1 SOLUTION RESPIRATORY (INHALATION) ONCE
Status: COMPLETED | OUTPATIENT
Start: 2017-12-02 | End: 2017-12-02

## 2017-12-02 RX ORDER — FLUTICASONE PROPIONATE 50 MCG
1 SPRAY, SUSPENSION (ML) NASAL DAILY
Qty: 1 BOTTLE | Refills: 0 | Status: ON HOLD | OUTPATIENT
Start: 2017-12-02 | End: 2019-01-08 | Stop reason: ALTCHOICE

## 2017-12-02 RX ADMIN — IPRATROPIUM BROMIDE AND ALBUTEROL SULFATE 1 AMPULE: .5; 3 SOLUTION RESPIRATORY (INHALATION) at 11:50

## 2017-12-02 NOTE — ED PROVIDER NOTES
as of 2017 12:17 PM      CONTINUE these medications which have NOT CHANGED    Details   glucose blood VI test strips (FREESTYLE LITE) strip Disp-90 strip, R-1, NormalTesting once daily      MELOXICAM PO Take by mouth dailyHistorical Med      cycloSPORINE (RESTASIS) 0.05 % ophthalmic emulsion 1 drop 2 times dailyHistorical Med      metoprolol (TOPROL-XL) 25 MG XL tablet Take 25 mg by mouth See Admin Instructions Take 1/2 in the morning and 1 1/2 at night      Potassium (POTASSIMIN PO) Take 10 mEq by mouth daily. tramadol (ULTRAM) 50 MG tablet Take 50 mg by mouth every 6 hours as needed. amlodipine (NORVASC) 5 MG tablet Take 5 mg by mouth daily. sitagliptan-metformin (JANUMET)  MG per tablet Take 1 tablet by mouth 2 times daily. pravastatin (PRAVACHOL) 20 MG tablet Take 20 mg by mouth daily       lisinopril-hydrochlorothiazide (ZESTORETIC) 20-25 MG per tablet Take 1 tablet by mouth daily. aspirin 81 MG EC tablet Take 81 mg by mouth daily. ALLERGIES     is allergic to morphine. FAMILY HISTORY     indicated that her mother is . She indicated that both of her brothers are . family history includes Heart Defect in her brother and brother; Heart Disease in her mother; Heart Disease (age of onset: 43) in her sister; Heart Disease (age of onset: 55) in her sister; High Cholesterol in her mother; Hypertension in her mother; Pacemaker in her mother. SOCIAL HISTORY      reports that she has been smoking Cigarettes. She has a 17.50 pack-year smoking history. She has never used smokeless tobacco. She reports that she drinks alcohol. She reports that she does not use drugs. PHYSICAL EXAM     INITIAL VITALS:  height is 5' 6\" (1.676 m) and weight is 185 lb (83.9 kg). Her oral temperature is 97.9 °F (36.6 °C). Her blood pressure is 128/86 and her pulse is 95. Her respiration is 18 and oxygen saturation is 94%.     Constitutional:  Well developed, Well nourished overweight -American woman, No acute distress, Non-toxic appearance. HENT:  Normocephalic, Atraumatic, Bilateral external ears normal,TMs clear without bulging or retraction, no sinus tenderness,  nares clear, oropharynx moist, No oral or tonsillar exudates, airway clear. Neck: Supple, normal range of motion, No posterior midline bony tenderness, no adenopathy  Eyes:  PERRL, EOMI, Conjunctiva normal, No discharge. Respiratory:  Breathing is non-labored on room air, poor air movement universally but no Rales, rhonchi, or wheezes. The patient coughs a dry, hacking cough frequently throughout the encounter. Cardiovascular:  Normal heart rate, Normal rhythm, No murmurs, No rubs, No gallops. Integument:  Warm, Dry,  No rash (on exposed areas), no bruising. Neurologic:  Alert & Age-appropriate, cranial nerves 2-12 grossly intact, speech fluent and cogent, Normal motor function, No focal deficits noted. DIFFERENTIAL DIAGNOSIS:   Infectious cough (influenza, Other viruses, Pertussis) versus lower respiratory infection (pneumonia). With patient's smoking history post obstructive pneumonia from malignancy must also be considered. DIAGNOSTIC RESULTS     RADIOLOGY: non-plain film images(s) such as CT, Ultrasound and MRI are read by the radiologist.  Plain radiographic images are visualized and preliminarily interpreted by the emergency physician unless otherwise stated below. No orders to display       LABS:   Labs Reviewed - No data to display    EMERGENCY DEPARTMENT COURSE:   Vitals:    Vitals:    12/02/17 1056 12/02/17 1213   BP: (!) 129/95 128/86   Pulse: 100 95   Resp: 16 18   Temp: 97.9 °F (36.6 °C)    TempSrc: Oral    SpO2: 97% 94%   Weight: 185 lb (83.9 kg)    Height: 5' 6\" (1.676 m)      The patient was treated with a DuoNeb after which it was objective and subjective improvement.   Continued to deny chest pain or difficulty breathing. CONSULTS:  None    PROCEDURES:  None    FINAL IMPRESSION      1. COPD exacerbation (HCC)          DISPOSITION/PLAN     1. COPD exacerbation ([address]     the patient is stable for discharge. Smoking cessation was discussed with the patient has little insight. Conservative and supportive care was also discussed. The patient will be discharged with antibiotics is stable for COPD exacerbation as well as antitussives. She has a primary care provider and is advised to follow-up thereafter the can. Warning signs for prompt return to the emergency department in the interim were also discussed. PATIENT REFERRED TO:  No follow-up provider specified. DISCHARGE MEDICATIONS:  Discharge Medication List as of 12/2/2017 12:17 PM      START taking these medications    Details   benzonatate (TESSALON PERLES) 100 MG capsule Take 1 capsule by mouth 3 times daily as needed for Cough, Disp-30 capsule, R-0Print      guaiFENesin-codeine (TUSSI-ORGANIDIN NR) 100-10 MG/5ML syrup Take 5 mLs by mouth nightly as needed for Cough . , Disp-118 mL, R-0Print      azithromycin (ZITHROMAX) 250 MG tablet Take 2 tablets (500 mg) on Day 1, followed by 1 tablet (250 mg) once daily on Days 2 through 5., Disp-1 packet, R-0Print      fluticasone (FLONASE) 50 MCG/ACT nasal spray 1 spray by Nasal route daily for 20 days, Disp-1 Bottle, R-0Print             (Please note that portions of this note were completed with a voice recognition program.  Efforts were made to edit the dictations but occasionally words are mis-transcribed.)    MD Alysia Raymond MD  12/02/17 1313

## 2017-12-18 ENCOUNTER — OFFICE VISIT (OUTPATIENT)
Dept: CARDIOLOGY CLINIC | Age: 57
End: 2017-12-18
Payer: MEDICARE

## 2017-12-18 VITALS
HEART RATE: 82 BPM | DIASTOLIC BLOOD PRESSURE: 70 MMHG | BODY MASS INDEX: 30.57 KG/M2 | HEIGHT: 66 IN | WEIGHT: 190.2 LBS | SYSTOLIC BLOOD PRESSURE: 122 MMHG

## 2017-12-18 DIAGNOSIS — I25.10 CORONARY ARTERY DISEASE INVOLVING NATIVE CORONARY ARTERY OF NATIVE HEART WITHOUT ANGINA PECTORIS: Primary | ICD-10-CM

## 2017-12-18 DIAGNOSIS — I25.41 ANEURYSM (ARTERIOVENOUS) OF CORONARY VESSELS: ICD-10-CM

## 2017-12-18 DIAGNOSIS — R00.2 PALPITATION: ICD-10-CM

## 2017-12-18 DIAGNOSIS — Z82.49 FH: CAD (CORONARY ARTERY DISEASE): ICD-10-CM

## 2017-12-18 PROCEDURE — 4004F PT TOBACCO SCREEN RCVD TLK: CPT | Performed by: INTERNAL MEDICINE

## 2017-12-18 PROCEDURE — 3017F COLORECTAL CA SCREEN DOC REV: CPT | Performed by: INTERNAL MEDICINE

## 2017-12-18 PROCEDURE — G8484 FLU IMMUNIZE NO ADMIN: HCPCS | Performed by: INTERNAL MEDICINE

## 2017-12-18 PROCEDURE — 3014F SCREEN MAMMO DOC REV: CPT | Performed by: INTERNAL MEDICINE

## 2017-12-18 PROCEDURE — G8427 DOCREV CUR MEDS BY ELIG CLIN: HCPCS | Performed by: INTERNAL MEDICINE

## 2017-12-18 PROCEDURE — G8598 ASA/ANTIPLAT THER USED: HCPCS | Performed by: INTERNAL MEDICINE

## 2017-12-18 PROCEDURE — 99213 OFFICE O/P EST LOW 20 MIN: CPT | Performed by: INTERNAL MEDICINE

## 2017-12-18 PROCEDURE — G8417 CALC BMI ABV UP PARAM F/U: HCPCS | Performed by: INTERNAL MEDICINE

## 2017-12-18 NOTE — PROGRESS NOTES
Shruthi Abbasi is a 61 y.o. female is here for  nonobstructive CAD has some palpitations and has had no chest pains . Intensity of the pain  None , provocation of the pain none , what relieves the pain none  where does  the pain migrates to no where and no nausea no fever and chills and no sob with and without activity. No evidence of swelling in legs with some palpitations and no syncopal episodes and no dizziness ,no bleeding ,or urination  Problems ,no double visions ,no speech problems and no bowel problems or diarrhea and tolerating medications     Patient Active Problem List   Diagnosis    Nonobstructive CAD (coronary artery disease)    Asthma    Diabetes mellitus ([address]    High cholesterol    High blood pressure    Wheezing    Fatigue    Joint pain    Arthritis    FH: CAD (coronary artery disease)    Tobacco abuse    Pseudoaneurysm (HCC)    Atypical chest pain    Costochondritis    Bell's palsy    Neck pain    Thyroid nodule    Nicotine abuse    SOB (shortness of breath)    Preop cardiovascular exam    Multinodular goiter    COPD with acute exacerbation (HCC)     Past Medical History:   Diagnosis Date    Arthritis     Asthma     Atypical chest pain     Atypical chest discomfort in hospital multiple times with stress test being normal.     Bell's palsy     COPD (chronic obstructive pulmonary disease) (HCC)     Costochondritis     Diabetes mellitus (HCC)     Fatigue     FH: CAD (coronary artery disease)     Strong family history of CAD.  High blood pressure     High cholesterol     Joint pain     Nonobstructive CAD (coronary artery disease)     Pseudoaneurysm (HCC)     Patient developed small pseudoaneurysm in right groin requiring thrombin injection by Dr. Esperanza Cordero which was successful.  Tobacco abuse     Tobacco abuse on Habitrol patch.     Wheezing      Social History   Substance Use Topics    Smoking status: Current Every Day Smoker     Packs/day: 0.50     Years:

## 2017-12-18 NOTE — PROGRESS NOTES
Pt here for 4 month fu     denies chest pain, dizziness, SOB   Pt states she does get some fluttering   NO MED LIST TODAY

## 2017-12-28 ENCOUNTER — HOSPITAL ENCOUNTER (OUTPATIENT)
Dept: NON INVASIVE DIAGNOSTICS | Age: 57
Discharge: HOME | End: 2017-12-28
Payer: MEDICARE

## 2017-12-28 DIAGNOSIS — Z82.49 FH: CAD (CORONARY ARTERY DISEASE): ICD-10-CM

## 2017-12-28 DIAGNOSIS — R00.2 PALPITATION: ICD-10-CM

## 2017-12-28 DIAGNOSIS — I25.10 CORONARY ARTERY DISEASE INVOLVING NATIVE CORONARY ARTERY OF NATIVE HEART WITHOUT ANGINA PECTORIS: ICD-10-CM

## 2017-12-28 LAB
LV EF: 55 %
LVEF MODALITY: NORMAL

## 2017-12-28 PROCEDURE — 93270 REMOTE 30 DAY ECG REV/REPORT: CPT

## 2017-12-28 PROCEDURE — 93306 TTE W/DOPPLER COMPLETE: CPT

## 2018-01-22 ENCOUNTER — OFFICE VISIT (OUTPATIENT)
Dept: ENDOCRINOLOGY | Age: 58
End: 2018-01-22
Payer: MEDICARE

## 2018-01-22 VITALS
SYSTOLIC BLOOD PRESSURE: 145 MMHG | DIASTOLIC BLOOD PRESSURE: 70 MMHG | BODY MASS INDEX: 30.86 KG/M2 | WEIGHT: 192 LBS | RESPIRATION RATE: 16 BRPM | HEIGHT: 66 IN

## 2018-01-22 DIAGNOSIS — E78.2 MIXED HYPERLIPIDEMIA: ICD-10-CM

## 2018-01-22 DIAGNOSIS — E11.9 TYPE 2 DIABETES MELLITUS WITHOUT COMPLICATION, WITHOUT LONG-TERM CURRENT USE OF INSULIN (HCC): ICD-10-CM

## 2018-01-22 DIAGNOSIS — E66.9 OBESITY (BMI 30-39.9): ICD-10-CM

## 2018-01-22 DIAGNOSIS — E04.2 MULTINODULAR GOITER: Primary | ICD-10-CM

## 2018-01-22 PROCEDURE — G8417 CALC BMI ABV UP PARAM F/U: HCPCS | Performed by: NURSE PRACTITIONER

## 2018-01-22 PROCEDURE — 3014F SCREEN MAMMO DOC REV: CPT | Performed by: NURSE PRACTITIONER

## 2018-01-22 PROCEDURE — 3017F COLORECTAL CA SCREEN DOC REV: CPT | Performed by: NURSE PRACTITIONER

## 2018-01-22 PROCEDURE — 3046F HEMOGLOBIN A1C LEVEL >9.0%: CPT | Performed by: NURSE PRACTITIONER

## 2018-01-22 PROCEDURE — G8427 DOCREV CUR MEDS BY ELIG CLIN: HCPCS | Performed by: NURSE PRACTITIONER

## 2018-01-22 PROCEDURE — 4004F PT TOBACCO SCREEN RCVD TLK: CPT | Performed by: NURSE PRACTITIONER

## 2018-01-22 PROCEDURE — 99214 OFFICE O/P EST MOD 30 MIN: CPT | Performed by: NURSE PRACTITIONER

## 2018-01-22 PROCEDURE — G8484 FLU IMMUNIZE NO ADMIN: HCPCS | Performed by: NURSE PRACTITIONER

## 2018-01-22 PROCEDURE — G8598 ASA/ANTIPLAT THER USED: HCPCS | Performed by: NURSE PRACTITIONER

## 2018-01-22 ASSESSMENT — ENCOUNTER SYMPTOMS
TROUBLE SWALLOWING: 1
ABDOMINAL PAIN: 0
VOICE CHANGE: 0
PHOTOPHOBIA: 0
SHORTNESS OF BREATH: 0
VOMITING: 0
CONSTIPATION: 0
CHEST TIGHTNESS: 0
NAUSEA: 0

## 2018-01-30 NOTE — PROCEDURES
[address] [place] [address]                                   EVENT MONITOR    PATIENT NAME: Della Watson                      :        1960  MED REC NO:   [id number]                           ROOM:  ACCOUNT NO:   [de-identified]                           ADMIT DATE: 2017  PROVIDER:     SERINA Jacinto Baton:  2017. TEST TYPE:  Event monitor. CLINICAL HISTORY AND INDICATION:  This is a patient with palpitations. EVENT MONITOR DESCRIPTION:  Event monitor was attached to the patient  between 2017 and 2018. EVENT MONITOR FINDINGS:  Baseline rhythm showed sinus rhythm. The  patient's symptoms of chest pain did not correlate with any specific  findings. Pretty much unremarkable for arrhythmia. CONCLUSION:  Unremarkable event monitor without any significant  arrhythmias.         Tomás Kelly M.D.    D: 2018 14:02:47       T: 2018 15:17:38     [email or web address]  Job#: [id number]     Doc#: [id number]    CC:

## 2018-01-31 ENCOUNTER — HOSPITAL ENCOUNTER (OUTPATIENT)
Dept: ULTRASOUND IMAGING | Age: 58
Discharge: HOME | End: 2018-01-31
Payer: MEDICARE

## 2018-01-31 ENCOUNTER — HOSPITAL ENCOUNTER (OUTPATIENT)
Age: 58
Discharge: HOME | End: 2018-01-31
Payer: MEDICARE

## 2018-01-31 DIAGNOSIS — E11.9 TYPE 2 DIABETES MELLITUS WITHOUT COMPLICATION, WITHOUT LONG-TERM CURRENT USE OF INSULIN (HCC): ICD-10-CM

## 2018-01-31 DIAGNOSIS — E04.2 MULTINODULAR GOITER: ICD-10-CM

## 2018-01-31 LAB
ALBUMIN SERPL-MCNC: 4.2 G/DL (ref 3.5–5.1)
ALP BLD-CCNC: 93 U/L (ref 38–126)
ALT SERPL-CCNC: 13 U/L (ref 11–66)
ANION GAP SERPL CALCULATED.3IONS-SCNC: 13 MEQ/L (ref 8–16)
AST SERPL-CCNC: 15 U/L (ref 5–40)
AVERAGE GLUCOSE: 123 MG/DL (ref 70–126)
BILIRUB SERPL-MCNC: 0.5 MG/DL (ref 0.3–1.2)
BUN BLDV-MCNC: 13 MG/DL (ref 7–22)
CALCIUM SERPL-MCNC: 9.4 MG/DL (ref 8.5–10.5)
CHLORIDE BLD-SCNC: 101 MEQ/L (ref 98–111)
CHOLESTEROL, TOTAL: 127 MG/DL (ref 100–199)
CO2: 27 MEQ/L (ref 23–33)
CREAT SERPL-MCNC: 0.7 MG/DL (ref 0.4–1.2)
CREATININE, URINE: 283 MG/DL
GFR SERPL CREATININE-BSD FRML MDRD: > 90 ML/MIN/1.73M2
GLUCOSE BLD-MCNC: 124 MG/DL (ref 70–108)
HBA1C MFR BLD: 6.1 % (ref 4.4–6.4)
HDLC SERPL-MCNC: 42 MG/DL
LDL CHOLESTEROL CALCULATED: 66 MG/DL
MICROALBUMIN UR-MCNC: 1.55 MG/DL
MICROALBUMIN/CREAT UR-RTO: 5 MG/G (ref 0–30)
POTASSIUM SERPL-SCNC: 3.8 MEQ/L (ref 3.5–5.2)
SODIUM BLD-SCNC: 141 MEQ/L (ref 135–145)
T3 FREE: 3.24 PG/ML (ref 2.02–4.43)
T4 FREE: 1.23 NG/DL (ref 0.93–1.76)
TOTAL PROTEIN: 7 G/DL (ref 6.1–8)
TRIGL SERPL-MCNC: 97 MG/DL (ref 0–199)
TSH SERPL DL<=0.05 MIU/L-ACNC: 0.92 UIU/ML (ref 0.4–4.2)

## 2018-01-31 PROCEDURE — 84439 ASSAY OF FREE THYROXINE: CPT

## 2018-01-31 PROCEDURE — 80053 COMPREHEN METABOLIC PANEL: CPT

## 2018-01-31 PROCEDURE — 82043 UR ALBUMIN QUANTITATIVE: CPT

## 2018-01-31 PROCEDURE — 80061 LIPID PANEL: CPT

## 2018-01-31 PROCEDURE — 36415 COLL VENOUS BLD VENIPUNCTURE: CPT

## 2018-01-31 PROCEDURE — 84481 FREE ASSAY (FT-3): CPT

## 2018-01-31 PROCEDURE — 84443 ASSAY THYROID STIM HORMONE: CPT

## 2018-01-31 PROCEDURE — 76536 US EXAM OF HEAD AND NECK: CPT

## 2018-01-31 PROCEDURE — 83036 HEMOGLOBIN GLYCOSYLATED A1C: CPT

## 2018-02-12 ENCOUNTER — OFFICE VISIT (OUTPATIENT)
Dept: CARDIOLOGY CLINIC | Age: 58
End: 2018-02-12
Payer: MEDICARE

## 2018-02-12 VITALS
HEIGHT: 66 IN | WEIGHT: 188 LBS | SYSTOLIC BLOOD PRESSURE: 152 MMHG | HEART RATE: 88 BPM | DIASTOLIC BLOOD PRESSURE: 82 MMHG | BODY MASS INDEX: 30.22 KG/M2

## 2018-02-12 DIAGNOSIS — Z82.49 FH: CAD (CORONARY ARTERY DISEASE): Primary | ICD-10-CM

## 2018-02-12 DIAGNOSIS — Z72.0 TOBACCO ABUSE: ICD-10-CM

## 2018-02-12 DIAGNOSIS — I10 ESSENTIAL HYPERTENSION: ICD-10-CM

## 2018-02-12 PROCEDURE — 3017F COLORECTAL CA SCREEN DOC REV: CPT | Performed by: INTERNAL MEDICINE

## 2018-02-12 PROCEDURE — G8598 ASA/ANTIPLAT THER USED: HCPCS | Performed by: INTERNAL MEDICINE

## 2018-02-12 PROCEDURE — G8484 FLU IMMUNIZE NO ADMIN: HCPCS | Performed by: INTERNAL MEDICINE

## 2018-02-12 PROCEDURE — G8427 DOCREV CUR MEDS BY ELIG CLIN: HCPCS | Performed by: INTERNAL MEDICINE

## 2018-02-12 PROCEDURE — G8417 CALC BMI ABV UP PARAM F/U: HCPCS | Performed by: INTERNAL MEDICINE

## 2018-02-12 PROCEDURE — 99213 OFFICE O/P EST LOW 20 MIN: CPT | Performed by: INTERNAL MEDICINE

## 2018-02-12 PROCEDURE — 3014F SCREEN MAMMO DOC REV: CPT | Performed by: INTERNAL MEDICINE

## 2018-02-12 PROCEDURE — 4004F PT TOBACCO SCREEN RCVD TLK: CPT | Performed by: INTERNAL MEDICINE

## 2018-02-12 NOTE — PROGRESS NOTES
Dc Edward is a 61 y.o. female is here for mild cad and doing well and has had no chest pains . Intensity of the pain none provocation of the pain none what relieves the pain none where does  the pain migrates to none  and no nausea no fever and chills and no sob with and without activity. No evidence of swelling in legs no palpitations and no syncopal episodes and no dizziness ,no bleeding ,or urination  Problems ,no double visions ,no speech problems and no bowel problems or diarrhea and tolerating medications     Patient Active Problem List   Diagnosis    Nonobstructive CAD (coronary artery disease)    Asthma    Diabetes mellitus ([facility]    High cholesterol    High blood pressure    Wheezing    Fatigue    Joint pain    Arthritis    FH: CAD (coronary artery disease)    Tobacco abuse    Pseudoaneurysm (HCC)    Atypical chest pain    Costochondritis    Bell's palsy    Neck pain    Thyroid nodule    Nicotine abuse    SOB (shortness of breath)    Preop cardiovascular exam    Multinodular goiter    COPD with acute exacerbation (HCC)    Palpitation     Past Medical History:   Diagnosis Date    Arthritis     Asthma     Atypical chest pain     Atypical chest discomfort in hospital multiple times with stress test being normal.     Bell's palsy     COPD (chronic obstructive pulmonary disease) (HCC)     Costochondritis     Diabetes mellitus (HCC)     Fatigue     FH: CAD (coronary artery disease)     Strong family history of CAD.  High blood pressure     High cholesterol     Joint pain     Nonobstructive CAD (coronary artery disease)     Pseudoaneurysm (HCC)     Patient developed small pseudoaneurysm in right groin requiring thrombin injection by Dr. Pallavi Villafuerte which was successful.  Tobacco abuse     Tobacco abuse on Habitrol patch.     Wheezing      Social History   Substance Use Topics    Smoking status: Current Every Day Smoker     Packs/day: 0.50     Years: 35.00     Types:

## 2018-03-07 ENCOUNTER — APPOINTMENT (OUTPATIENT)
Dept: GENERAL RADIOLOGY | Age: 58
End: 2018-03-07
Payer: MEDICARE

## 2018-03-07 ENCOUNTER — HOSPITAL ENCOUNTER (EMERGENCY)
Age: 58
Discharge: HOME | End: 2018-03-07
Payer: MEDICARE

## 2018-03-07 VITALS
TEMPERATURE: 98.3 F | BODY MASS INDEX: 30.22 KG/M2 | HEIGHT: 66 IN | DIASTOLIC BLOOD PRESSURE: 91 MMHG | HEART RATE: 100 BPM | SYSTOLIC BLOOD PRESSURE: 175 MMHG | RESPIRATION RATE: 18 BRPM | WEIGHT: 188 LBS | OXYGEN SATURATION: 98 %

## 2018-03-07 DIAGNOSIS — J45.21 MILD INTERMITTENT ASTHMATIC BRONCHITIS WITH ACUTE EXACERBATION: Primary | ICD-10-CM

## 2018-03-07 DIAGNOSIS — R10.10 UPPER ABDOMINAL PAIN: ICD-10-CM

## 2018-03-07 LAB
ALBUMIN SERPL-MCNC: 4.2 G/DL (ref 3.5–5.1)
ALP BLD-CCNC: 120 U/L (ref 38–126)
ALT SERPL-CCNC: 31 U/L (ref 11–66)
ANION GAP SERPL CALCULATED.3IONS-SCNC: 17 MEQ/L (ref 8–16)
AST SERPL-CCNC: 26 U/L (ref 5–40)
BASOPHILS # BLD: 0.2 %
BASOPHILS ABSOLUTE: 0 THOU/MM3 (ref 0–0.1)
BILIRUB SERPL-MCNC: 0.3 MG/DL (ref 0.3–1.2)
BILIRUBIN DIRECT: < 0.2 MG/DL (ref 0–0.3)
BUN BLDV-MCNC: 15 MG/DL (ref 7–22)
CALCIUM SERPL-MCNC: 8.9 MG/DL (ref 8.5–10.5)
CHLORIDE BLD-SCNC: 99 MEQ/L (ref 98–111)
CO2: 23 MEQ/L (ref 23–33)
CREAT SERPL-MCNC: 0.8 MG/DL (ref 0.4–1.2)
EKG ATRIAL RATE: 88 BPM
EKG P AXIS: 70 DEGREES
EKG P-R INTERVAL: 158 MS
EKG Q-T INTERVAL: 370 MS
EKG QRS DURATION: 82 MS
EKG QTC CALCULATION (BAZETT): 447 MS
EKG R AXIS: 39 DEGREES
EKG T AXIS: 37 DEGREES
EKG VENTRICULAR RATE: 88 BPM
EOSINOPHIL # BLD: 5.7 %
EOSINOPHILS ABSOLUTE: 0.4 THOU/MM3 (ref 0–0.4)
FLU A ANTIGEN: NEGATIVE
FLU B ANTIGEN: NEGATIVE
GFR SERPL CREATININE-BSD FRML MDRD: 89 ML/MIN/1.73M2
GLUCOSE BLD-MCNC: 179 MG/DL (ref 70–108)
HCT VFR BLD CALC: 43.5 % (ref 37–47)
HEMOGLOBIN: 14.6 GM/DL (ref 12–16)
LACTIC ACID: 1.9 MMOL/L (ref 0.5–2.2)
LIPASE: 57.7 U/L (ref 5.6–51.3)
LYMPHOCYTES # BLD: 14.3 %
LYMPHOCYTES ABSOLUTE: 1 THOU/MM3 (ref 1–4.8)
MCH RBC QN AUTO: 30.7 PG (ref 27–31)
MCHC RBC AUTO-ENTMCNC: 33.5 GM/DL (ref 33–37)
MCV RBC AUTO: 91.6 FL (ref 81–99)
MONOCYTES # BLD: 16.1 %
MONOCYTES ABSOLUTE: 1.1 THOU/MM3 (ref 0.4–1.3)
NUCLEATED RED BLOOD CELLS: 0 /100 WBC
OSMOLALITY CALCULATION: 282.8 MOSMOL/KG (ref 275–300)
PDW BLD-RTO: 13.2 % (ref 11.5–14.5)
PLATELET # BLD: 217 THOU/MM3 (ref 130–400)
PMV BLD AUTO: 9.1 FL (ref 7.4–10.4)
POTASSIUM SERPL-SCNC: 3.9 MEQ/L (ref 3.5–5.2)
PRO-BNP: 99.9 PG/ML (ref 0–900)
RBC # BLD: 4.75 MILL/MM3 (ref 4.2–5.4)
SEG NEUTROPHILS: 63.7 %
SEGMENTED NEUTROPHILS ABSOLUTE COUNT: 4.4 THOU/MM3 (ref 1.8–7.7)
SODIUM BLD-SCNC: 139 MEQ/L (ref 135–145)
TOTAL PROTEIN: 7.1 G/DL (ref 6.1–8)
TROPONIN T: < 0.01 NG/ML
WBC # BLD: 6.9 THOU/MM3 (ref 4.8–10.8)

## 2018-03-07 PROCEDURE — 83690 ASSAY OF LIPASE: CPT

## 2018-03-07 PROCEDURE — 99284 EMERGENCY DEPT VISIT MOD MDM: CPT

## 2018-03-07 PROCEDURE — 87804 INFLUENZA ASSAY W/OPTIC: CPT

## 2018-03-07 PROCEDURE — 85025 COMPLETE CBC W/AUTO DIFF WBC: CPT

## 2018-03-07 PROCEDURE — 71046 X-RAY EXAM CHEST 2 VIEWS: CPT

## 2018-03-07 PROCEDURE — 83880 ASSAY OF NATRIURETIC PEPTIDE: CPT

## 2018-03-07 PROCEDURE — 82248 BILIRUBIN DIRECT: CPT

## 2018-03-07 PROCEDURE — 74018 RADEX ABDOMEN 1 VIEW: CPT

## 2018-03-07 PROCEDURE — 84484 ASSAY OF TROPONIN QUANT: CPT

## 2018-03-07 PROCEDURE — 93010 ELECTROCARDIOGRAM REPORT: CPT | Performed by: INTERNAL MEDICINE

## 2018-03-07 PROCEDURE — 36415 COLL VENOUS BLD VENIPUNCTURE: CPT

## 2018-03-07 PROCEDURE — 6370000000 HC RX 637 (ALT 250 FOR IP): Performed by: PHYSICIAN ASSISTANT

## 2018-03-07 PROCEDURE — 93005 ELECTROCARDIOGRAM TRACING: CPT | Performed by: PHYSICIAN ASSISTANT

## 2018-03-07 PROCEDURE — 83605 ASSAY OF LACTIC ACID: CPT

## 2018-03-07 PROCEDURE — 80053 COMPREHEN METABOLIC PANEL: CPT

## 2018-03-07 RX ORDER — SUCRALFATE 1 G/1
1 TABLET ORAL EVERY 6 HOURS SCHEDULED
Status: DISCONTINUED | OUTPATIENT
Start: 2018-03-07 | End: 2018-03-07 | Stop reason: HOSPADM

## 2018-03-07 RX ORDER — AZITHROMYCIN 250 MG/1
TABLET, FILM COATED ORAL
Qty: 6 TABLET | Refills: 0 | Status: SHIPPED | OUTPATIENT
Start: 2018-03-07 | End: 2019-01-08

## 2018-03-07 RX ORDER — DICYCLOMINE HYDROCHLORIDE 10 MG/1
20 CAPSULE ORAL
Qty: 40 CAPSULE | Refills: 0 | Status: SHIPPED | OUTPATIENT
Start: 2018-03-07 | End: 2019-01-08

## 2018-03-07 RX ORDER — PROMETHAZINE HYDROCHLORIDE AND CODEINE PHOSPHATE 6.25; 1 MG/5ML; MG/5ML
5 SYRUP ORAL 4 TIMES DAILY PRN
Qty: 118 ML | Refills: 0 | Status: SHIPPED | OUTPATIENT
Start: 2018-03-07 | End: 2018-03-12

## 2018-03-07 RX ORDER — PANTOPRAZOLE SODIUM 20 MG/1
20 TABLET, DELAYED RELEASE ORAL DAILY
Qty: 30 TABLET | Refills: 0 | Status: SHIPPED | OUTPATIENT
Start: 2018-03-07 | End: 2019-01-08

## 2018-03-07 RX ORDER — PANTOPRAZOLE SODIUM 40 MG/1
40 TABLET, DELAYED RELEASE ORAL ONCE
Status: COMPLETED | OUTPATIENT
Start: 2018-03-07 | End: 2018-03-07

## 2018-03-07 RX ORDER — PREDNISONE 20 MG/1
40 TABLET ORAL DAILY
Qty: 10 TABLET | Refills: 0 | Status: SHIPPED | OUTPATIENT
Start: 2018-03-07 | End: 2018-03-12

## 2018-03-07 RX ADMIN — SUCRALFATE 1 G: 1 TABLET ORAL at 10:43

## 2018-03-07 RX ADMIN — PANTOPRAZOLE SODIUM 40 MG: 40 TABLET, DELAYED RELEASE ORAL at 10:40

## 2018-03-07 RX ADMIN — Medication 15 ML: at 10:40

## 2018-03-07 ASSESSMENT — ENCOUNTER SYMPTOMS
FACIAL SWELLING: 0
EYE REDNESS: 0
EYE DISCHARGE: 0
SHORTNESS OF BREATH: 0
ABDOMINAL PAIN: 1
STRIDOR: 0
DIARRHEA: 0
RHINORRHEA: 0
ABDOMINAL DISTENTION: 0
PHOTOPHOBIA: 0
COLOR CHANGE: 0
NAUSEA: 0
VOMITING: 0
COUGH: 1

## 2018-03-07 ASSESSMENT — PAIN DESCRIPTION - PAIN TYPE: TYPE: ACUTE PAIN

## 2018-03-07 ASSESSMENT — PAIN DESCRIPTION - FREQUENCY: FREQUENCY: CONTINUOUS

## 2018-03-07 ASSESSMENT — PAIN DESCRIPTION - PROGRESSION: CLINICAL_PROGRESSION: GRADUALLY WORSENING

## 2018-03-07 ASSESSMENT — PAIN DESCRIPTION - LOCATION: LOCATION: ABDOMEN

## 2018-03-07 ASSESSMENT — PAIN DESCRIPTION - ORIENTATION: ORIENTATION: RIGHT;LEFT;UPPER

## 2018-03-07 ASSESSMENT — PAIN SCALES - GENERAL: PAINLEVEL_OUTOF10: 8

## 2018-03-07 ASSESSMENT — PAIN DESCRIPTION - DESCRIPTORS: DESCRIPTORS: ACHING

## 2018-03-07 NOTE — ED PROVIDER NOTES
[facility]  eMERGENCY dEPARTMENT eNCOUnter          [address] [facility]       Chief Complaint   Patient presents with    Cough    Abdominal Pain    Fatigue       Nurses Notes reviewed and I agree except as noted in the HPI. HISTORY OF PRESENT ILLNESS    Aditi Quinonez is a 60 y.o. female who presents to the Emergency Department for the evaluation of abdominal pain. The patient states she developed abdominal pain about two weeks ago. She states it almost feels as though she's hungry, stating it feels tight in the upper abdominal area. The patient rates this pain as an 8/10 in severity and states it is intermittent. She states she will eat, but it doesn't go away. The patient states she will get brief relief with tylenol. She denies fever, nausea, vomiting, constipation, diarrhea, blood in her stool, urinary changes, back pain, chest pain or hemoptysis. No weight changes. The patient denies history of stomach ulcers. She reports smoking half a pack per day and states she drinks about 2 cans of alcohol most weekends. The patient reports increased stress recently. She states she takes Tramadol, denies NSAID use. The patient denies history of abdominal problems. The patient states she also developed cough and headache two days ago. The patient reports history of asthma and has intermittent wheezing and shortness of breath. Location/Symptom: abdominal pain  Timing/Onset: two weeks ago  Quality: tight, \"feels hungry\"  Duration: intermittent  Modifying Factors: no relief with eating, brief relief with tylenol  Severity: 8/10    The HPI was provided by the patient. REVIEW OF SYSTEMS     Review of Systems   Constitutional: Negative for chills, diaphoresis and fever. HENT: Negative for ear discharge, facial swelling and rhinorrhea. Eyes: Negative for photophobia, discharge and redness. Respiratory: Positive for cough (nonproductive). Negative for shortness of breath and stridor. Cardiovascular: Negative for palpitations and leg swelling. Gastrointestinal: Positive for abdominal pain. Negative for abdominal distention, diarrhea, nausea and vomiting. Musculoskeletal: Negative for gait problem and joint swelling. Skin: Negative for color change and rash (on exposed body surfaces). Neurological: Positive for headaches. Negative for tremors and syncope. Psychiatric/Behavioral: Negative for agitation and confusion. The patient is not nervous/anxious. PAST MEDICAL HISTORY    has a past medical history of Arthritis; Asthma; Atypical chest pain; Bell's palsy; COPD (chronic obstructive pulmonary disease) ([address] Costochondritis; Diabetes mellitus ([address] Fatigue; FH: CAD (coronary artery disease); High blood pressure; High cholesterol; Joint pain; Nonobstructive CAD (coronary artery disease); Pseudoaneurysm ([address] Tobacco abuse; and Wheezing. SURGICAL HISTORY      has a past surgical history that includes partial hysterectomy (cervix not removed) (1999); Rotator cuff repair (Bilateral, 2007); cardiovascular stress test (10 02 2009); doppler echocardiography (10 02 2009); Diagnostic Cardiac Cath Lab Procedure (10 12 2009); and back surgery (12/05/2016). CURRENT MEDICATIONS       Discharge Medication List as of 3/7/2018 11:45 AM      CONTINUE these medications which have NOT CHANGED    Details   fluticasone (FLONASE) 50 MCG/ACT nasal spray 1 spray by Nasal route daily for 20 days, Disp-1 Bottle, R-0Print      glucose blood VI test strips (FREESTYLE LITE) strip Disp-90 strip, R-1, NormalTesting once daily      MELOXICAM PO Take by mouth dailyHistorical Med      cycloSPORINE (RESTASIS) 0.05 % ophthalmic emulsion 1 drop 2 times dailyHistorical Med      metoprolol (TOPROL-XL) 25 MG XL tablet Take 25 mg by mouth See Admin Instructions Take 1 tab in Am and 0.5 tab in PmHistorical Med      Potassium (POTASSIMIN PO) Take 10 mEq by mouth daily.       tramadol (ULTRAM) 50 MG tablet Take 50 mg the following:     Est, Glom Filt Rate 89 (*)     All other components within normal limits   RAPID INFLUENZA A/B ANTIGENS   CBC WITH AUTO DIFFERENTIAL   TROPONIN   BRAIN NATRIURETIC PEPTIDE   HEPATIC FUNCTION PANEL   LACTIC ACID, PLASMA   OSMOLALITY       EMERGENCY DEPARTMENT COURSE:   Vitals:    Vitals:    03/07/18 0948   BP: (!) 175/91   Pulse: 100   Resp: 18   Temp: 98.3 °F (36.8 °C)   TempSrc: Oral   SpO2: 98%   Weight: 188 lb (85.3 kg)   Height: 5' 6\" (1.676 m)     9:53 AM: The patient was seen and evaluated. 12:40 PM: Discussed results, diagnosis and plan with the patient. Questions were addressed. Disposition and follow-up were agreed upon. Specific discharge instructions explained, including reasons to return to the emergency department. MDM:  The patient was seen and evaluated within the ED today following abdominal pain. Within the department, I observed the patient's vital signs to be within acceptable range. On exam, I appreciated generalized upper abdominal tenderness without guarding, negative Angulo's sign. Radiological studies within the department revealed no acute findings. Laboratory work was reassuring. EKG was normal. Within the department, the patient was treated with carafate, GI cocktail and protonix. I observed the patient's condition to improve during the duration of their stay. I explained my proposed course of treatment to the patient, and they were amenable to my decision. They were discharged home, and they will return to the ED if their symptoms become more severe in nature, or otherwise change. CRITICAL CARE:   None     CONSULTS:  None    PROCEDURES:  None    FINAL IMPRESSION      1. Mild intermittent asthmatic bronchitis with acute exacerbation    2. Upper abdominal pain          DISPOSITION/PLAN   Patient was discharged in stable condition. Will return if symptoms change or worsen, or for any sign or symptom deemed emergent by the patient or family members.  Follow up as

## 2018-05-25 ENCOUNTER — HOSPITAL ENCOUNTER (OUTPATIENT)
Age: 58
Discharge: HOME | End: 2018-05-25
Payer: MEDICARE

## 2018-05-25 LAB
ALBUMIN SERPL-MCNC: 4.2 G/DL (ref 3.5–5.1)
ALP BLD-CCNC: 90 U/L (ref 38–126)
ALT SERPL-CCNC: 17 U/L (ref 11–66)
ANION GAP SERPL CALCULATED.3IONS-SCNC: 14 MEQ/L (ref 8–16)
AST SERPL-CCNC: 15 U/L (ref 5–40)
BILIRUB SERPL-MCNC: 0.4 MG/DL (ref 0.3–1.2)
BUN BLDV-MCNC: 11 MG/DL (ref 7–22)
CALCIUM SERPL-MCNC: 9.2 MG/DL (ref 8.5–10.5)
CHLORIDE BLD-SCNC: 104 MEQ/L (ref 98–111)
CHOLESTEROL, TOTAL: 136 MG/DL (ref 100–199)
CO2: 25 MEQ/L (ref 23–33)
CREAT SERPL-MCNC: 0.6 MG/DL (ref 0.4–1.2)
GFR SERPL CREATININE-BSD FRML MDRD: > 90 ML/MIN/1.73M2
GLUCOSE BLD-MCNC: 139 MG/DL (ref 70–108)
HCT VFR BLD CALC: 42 % (ref 37–47)
HDLC SERPL-MCNC: 44 MG/DL
HEMOGLOBIN: 14.4 GM/DL (ref 12–16)
LDL CHOLESTEROL CALCULATED: 69 MG/DL
MCH RBC QN AUTO: 32 PG (ref 27–31)
MCHC RBC AUTO-ENTMCNC: 34.3 GM/DL (ref 33–37)
MCV RBC AUTO: 93.5 FL (ref 81–99)
PDW BLD-RTO: 13.2 % (ref 11.5–14.5)
PLATELET # BLD: 238 THOU/MM3 (ref 130–400)
PMV BLD AUTO: 9.4 FL (ref 7.4–10.4)
POTASSIUM SERPL-SCNC: 3.7 MEQ/L (ref 3.5–5.2)
RBC # BLD: 4.49 MILL/MM3 (ref 4.2–5.4)
SODIUM BLD-SCNC: 143 MEQ/L (ref 135–145)
TOTAL PROTEIN: 6.9 G/DL (ref 6.1–8)
TRIGL SERPL-MCNC: 117 MG/DL (ref 0–199)
WBC # BLD: 8.2 THOU/MM3 (ref 4.8–10.8)

## 2018-05-25 PROCEDURE — 36415 COLL VENOUS BLD VENIPUNCTURE: CPT

## 2018-05-25 PROCEDURE — 80053 COMPREHEN METABOLIC PANEL: CPT

## 2018-05-25 PROCEDURE — 85027 COMPLETE CBC AUTOMATED: CPT

## 2018-05-25 PROCEDURE — 80061 LIPID PANEL: CPT

## 2018-08-13 ENCOUNTER — OFFICE VISIT (OUTPATIENT)
Dept: INTERNAL MEDICINE CLINIC | Age: 58
End: 2018-08-13
Payer: MEDICARE

## 2018-08-13 VITALS
WEIGHT: 184.6 LBS | HEART RATE: 72 BPM | DIASTOLIC BLOOD PRESSURE: 80 MMHG | SYSTOLIC BLOOD PRESSURE: 132 MMHG | RESPIRATION RATE: 16 BRPM | BODY MASS INDEX: 29.8 KG/M2

## 2018-08-13 DIAGNOSIS — J44.9 CHRONIC OBSTRUCTIVE PULMONARY DISEASE, UNSPECIFIED COPD TYPE (HCC): ICD-10-CM

## 2018-08-13 DIAGNOSIS — E04.1 THYROID NODULE: ICD-10-CM

## 2018-08-13 DIAGNOSIS — E04.2 MULTINODULAR GOITER: ICD-10-CM

## 2018-08-13 DIAGNOSIS — Z72.0 TOBACCO ABUSE: ICD-10-CM

## 2018-08-13 DIAGNOSIS — E11.9 WELL CONTROLLED TYPE 2 DIABETES MELLITUS (HCC): Primary | ICD-10-CM

## 2018-08-13 PROCEDURE — G8598 ASA/ANTIPLAT THER USED: HCPCS | Performed by: INTERNAL MEDICINE

## 2018-08-13 PROCEDURE — G8926 SPIRO NO PERF OR DOC: HCPCS | Performed by: INTERNAL MEDICINE

## 2018-08-13 PROCEDURE — G8427 DOCREV CUR MEDS BY ELIG CLIN: HCPCS | Performed by: INTERNAL MEDICINE

## 2018-08-13 PROCEDURE — 4004F PT TOBACCO SCREEN RCVD TLK: CPT | Performed by: INTERNAL MEDICINE

## 2018-08-13 PROCEDURE — 3044F HG A1C LEVEL LT 7.0%: CPT | Performed by: INTERNAL MEDICINE

## 2018-08-13 PROCEDURE — 99204 OFFICE O/P NEW MOD 45 MIN: CPT | Performed by: INTERNAL MEDICINE

## 2018-08-13 PROCEDURE — 3023F SPIROM DOC REV: CPT | Performed by: INTERNAL MEDICINE

## 2018-08-13 PROCEDURE — 3017F COLORECTAL CA SCREEN DOC REV: CPT | Performed by: INTERNAL MEDICINE

## 2018-08-13 PROCEDURE — 2022F DILAT RTA XM EVC RTNOPTHY: CPT | Performed by: INTERNAL MEDICINE

## 2018-08-13 PROCEDURE — G8417 CALC BMI ABV UP PARAM F/U: HCPCS | Performed by: INTERNAL MEDICINE

## 2018-08-13 ASSESSMENT — PATIENT HEALTH QUESTIONNAIRE - PHQ9
SUM OF ALL RESPONSES TO PHQ QUESTIONS 1-9: 2
2. FEELING DOWN, DEPRESSED OR HOPELESS: 1
SUM OF ALL RESPONSES TO PHQ QUESTIONS 1-9: 2
SUM OF ALL RESPONSES TO PHQ9 QUESTIONS 1 & 2: 2
1. LITTLE INTEREST OR PLEASURE IN DOING THINGS: 1

## 2018-08-13 NOTE — PROGRESS NOTES
[facility] PROFESSIONAL SERVS  PHYSICIANS [place] [address] [address]  Dept: [phone]  Dept Fax: [phone]      Chief Complaint   Patient presents with    Diabetes    Thyroid Problem     goiter    Results     labs completed 1/2018, thyroid ultrasound    Eye Exam     completed 6/12/17, appt scheduled for tomorrow with Dr Yvonne Fitzpatrick       Patient presents for evaluation of diabetes, thyroid issues. I have never seen the patient before. This patient is followed regularly by Dr. Mayra Patrick  Patient used to see Dr. Patrizia Bear as well as Roberta Hurt. She was diagnosed with diabetes 6-7 years ago. She was on insulin for a short time but no longer takes it secondary to low blood sugars. She takes Janumet  She has COPD, continues to smoke multiple other comorbidities    Past Medical History:   Diagnosis Date    Arthritis     Asthma     Atypical chest pain     Atypical chest discomfort in hospital multiple times with stress test being normal.     Bell's palsy     COPD (chronic obstructive pulmonary disease) (HCC)     Costochondritis     Diabetes mellitus (HCC)     Fatigue     FH: CAD (coronary artery disease)     Strong family history of CAD.  High blood pressure     High cholesterol     Joint pain     Nonobstructive CAD (coronary artery disease)     Pseudoaneurysm (HCC)     Patient developed small pseudoaneurysm in right groin requiring thrombin injection by Dr. Margarita Costello which was successful.  Tobacco abuse     Tobacco abuse on Habitrol patch.     Wheezing        Current Outpatient Prescriptions   Medication Sig Dispense Refill    glucose blood VI test strips (FREESTYLE LITE) strip Testing once daily 90 strip 1    MELOXICAM PO Take by mouth daily      cycloSPORINE (RESTASIS) 0.05 % ophthalmic emulsion 1 drop 2 times daily      metoprolol (TOPROL-XL) 25 MG XL tablet Take 25 mg by mouth See Admin Instructions Take 1 tab in Am and 0.5 tab in Pm      Potassium (POTASSIMIN PO) Take 10 mEq by mouth daily.  tramadol (ULTRAM) 50 MG tablet Take 50 mg by mouth every 6 hours as needed.  amlodipine (NORVASC) 5 MG tablet Take 5 mg by mouth daily.  sitagliptan-metformin (JANUMET)  MG per tablet Take 1 tablet by mouth 2 times daily.  pravastatin (PRAVACHOL) 20 MG tablet Take 20 mg by mouth daily       lisinopril-hydrochlorothiazide (ZESTORETIC) 20-25 MG per tablet Take 1 tablet by mouth daily.  dicyclomine (BENTYL) 10 MG capsule Take 2 capsules by mouth 4 times daily (before meals and nightly) 40 capsule 0    pantoprazole (PROTONIX) 20 MG tablet Take 1 tablet by mouth daily 30 tablet 0    azithromycin (ZITHROMAX) 250 MG tablet Take 2 tabs po on day one. Take 1 tab po daily on days 2-5. 6 tablet 0    fluticasone (FLONASE) 50 MCG/ACT nasal spray 1 spray by Nasal route daily for 20 days 1 Bottle 0    aspirin 81 MG EC tablet Take 81 mg by mouth daily. No current facility-administered medications for this visit. Past Surgical History:   Procedure Laterality Date    BACK SURGERY  12/05/2016    CARDIOVASCULAR STRESS TEST  10 02 2009    Gated SPECT LV function demonstrated normal thickening, normal contractility, normal wall motion, EF of 46% which is low normal; however, visualized by myself appears to be 55%. No evidence of prior transmural MI or ischemia. Excellend treadmill exercise. Completed 10 METS, functional class I. No evidence of ischemic -induced EKG changes are noted. Appropriate hemodynamic response to exercise.  DIAGNOSTIC CARDIAC CATH LAB PROCEDURE  10 12 2009    LV demonstrates normal systolic function, EF 30%. No critical lesions are noted in all larger pericardial vessels & show that left main is widely patent. Circumflex is widely patent with anterior marginal one & two being widely patent. Left anterior descending artery is widely patent, diagonal one large caliber vessel, widely patent.  Right coronary artery is widely patent & is dominant.  DOPPLER ECHOCARDIOGRAPHY  10 02 2009    LV demonstrated normal thickening, normal contractility, normal motion with mild systolic dilatation of the ventricle noted. EF 60%. Left atrium is mildly dilated. There is stage I diastolic dysfunction.  PARTIAL HYSTERECTOMY  1999    ROTATOR CUFF REPAIR Bilateral 2007       Allergies   Allergen Reactions    Morphine        Social History     Social History    Marital status: Single     Spouse name: N/A    Number of children: N/A    Years of education: N/A     Occupational History    Not on file. Social History Main Topics    Smoking status: Current Every Day Smoker     Packs/day: 0.50     Years: 35.00     Types: Cigarettes    Smokeless tobacco: Never Used    Alcohol use 0.0 oz/week      Comment: Patient states, \"occasionally. \"    Drug use: No    Sexual activity: Yes     Partners: Male     Other Topics Concern    Not on file     Social History Narrative    No narrative on file   2 children  Disability for back    Family History   Problem Relation Age of Onset    Heart Disease Mother     Hypertension Mother     High Cholesterol Mother     Pacemaker Mother     Heart Defect Brother     Heart Defect Brother     Heart Disease Sister 43        Sister had CABG.  Heart Disease Sister 55        Another sister had CABG. Review of Systems - General ROS: negative  Psychological ROS: negative  Hematological and Lymphatic ROS: negative  Respiratory ROS: positive for - cough and shortness of breath  Cardiovascular ROS: no chest pain or dyspnea on exertion  Gastrointestinal ROS: no abdominal pain, change in bowel habits, or black or bloody stools  Genito-Urinary ROS: no dysuria, trouble voiding, or hematuria  Musculoskeletal ROS: negative  Neurological ROS: no TIA or stroke symptoms  Dermatological ROS: negative    Blood pressure 132/80, pulse 72, resp.  rate 16, weight 184 lb 9.6 oz (83.7 kg), not currently breastfeeding. Physical Examination: General appearance - alert, well appearing, and in no distress  Mental status - alert, oriented to person, place, and time  Neck - supple, no significant adenopathy  Chest - rhonchi noted bilaterally  Heart - normal rate, regular rhythm, normal S1, S2, no murmurs, rubs, clicks or gallops  Abdomen - soft, nontender, nondistended, no masses or organomegaly  Neurological - alert, oriented, normal speech, no focal findings or movement disorder noted  Musculoskeletal - no joint tenderness, deformity or swelling  Extremities - peripheral pulses normal, no pedal edema, no clubbing or cyanosis  Skin - normal coloration and turgor, no rashes, no suspicious skin lesions noted     THYROID ULTRASOUND:(1/18/18)  Impression   1. There is stable mild thyromegaly. 2. There is a stable 0.5 x 0.4 x 0.5 cm complex cystic nodule within the medial aspect of the lower pole the left lobe of the thyroid gland (low suspicion pattern American Thyroid Association criteria). 3. Stable hypervascularity throughout the thyroid gland which is nonspecific however can be associated with a thyroiditis. 4. A follow-up ultrasound examination of the thyroid gland in    1-2 years is recommended to confirm continued stability.            Diagnosis Orders   1. Well controlled type 2 diabetes mellitus ([address]  Hemoglobin A1C   2. Thyroid nodule  TSH With Reflex Ft4   3. Tobacco abuse     4. Chronic obstructive pulmonary disease, unspecified COPD type ([address]     5. Multinodular goiter       Extensive counseling was done regarding diabetes.  The goals are to decrease or prevent the complications of diabetes and improve survival. The following goals were discussed  HgbA1c < 7 (providing no hypoglycemia)    BMI  18-25    BP < 130/80    STATIN(medium or high risk)    DIET <20% protein  < 30% fat, no trans fats, calorie restricted if BMI high    URINE MICROALBUMIN/CREATINIE  < 30     DILATED EYE EXAM EVERY 1-2

## 2018-08-23 ENCOUNTER — HOSPITAL ENCOUNTER (OUTPATIENT)
Age: 58
Discharge: HOME | End: 2018-08-23
Payer: MEDICARE

## 2018-08-23 DIAGNOSIS — E04.1 THYROID NODULE: ICD-10-CM

## 2018-08-23 DIAGNOSIS — E11.9 WELL CONTROLLED TYPE 2 DIABETES MELLITUS (HCC): ICD-10-CM

## 2018-08-23 LAB
AVERAGE GLUCOSE: 132 MG/DL (ref 70–126)
HBA1C MFR BLD: 6.4 % (ref 4.4–6.4)
TSH SERPL DL<=0.05 MIU/L-ACNC: 0.62 UIU/ML (ref 0.4–4.2)

## 2018-08-23 PROCEDURE — 36415 COLL VENOUS BLD VENIPUNCTURE: CPT

## 2018-08-23 PROCEDURE — 83036 HEMOGLOBIN GLYCOSYLATED A1C: CPT

## 2018-08-23 PROCEDURE — 84443 ASSAY THYROID STIM HORMONE: CPT

## 2019-01-08 ENCOUNTER — HOSPITAL ENCOUNTER (INPATIENT)
Age: 59
LOS: 2 days | Discharge: HOME | DRG: 313 | End: 2019-01-10
Attending: EMERGENCY MEDICINE | Admitting: INTERNAL MEDICINE
Payer: MEDICARE

## 2019-01-08 ENCOUNTER — APPOINTMENT (OUTPATIENT)
Dept: GENERAL RADIOLOGY | Age: 59
DRG: 313 | End: 2019-01-08
Payer: MEDICARE

## 2019-01-08 DIAGNOSIS — R07.9 CHEST PAIN ON EXERTION: Primary | ICD-10-CM

## 2019-01-08 LAB
ANION GAP SERPL CALCULATED.3IONS-SCNC: 12 MEQ/L (ref 8–16)
AVERAGE GLUCOSE: 135 MG/DL (ref 70–126)
BASOPHILS # BLD: 1 %
BASOPHILS ABSOLUTE: 0.1 THOU/MM3 (ref 0–0.1)
BUN BLDV-MCNC: 10 MG/DL (ref 7–22)
CALCIUM SERPL-MCNC: 9.3 MG/DL (ref 8.5–10.5)
CHLORIDE BLD-SCNC: 103 MEQ/L (ref 98–111)
CO2: 22 MEQ/L (ref 23–33)
CREAT SERPL-MCNC: 0.7 MG/DL (ref 0.4–1.2)
EKG ATRIAL RATE: 87 BPM
EKG P AXIS: 79 DEGREES
EKG P-R INTERVAL: 160 MS
EKG Q-T INTERVAL: 372 MS
EKG QRS DURATION: 80 MS
EKG QTC CALCULATION (BAZETT): 447 MS
EKG R AXIS: 69 DEGREES
EKG T AXIS: 80 DEGREES
EKG VENTRICULAR RATE: 87 BPM
EOSINOPHIL # BLD: 5.5 %
EOSINOPHILS ABSOLUTE: 0.4 THOU/MM3 (ref 0–0.4)
ERYTHROCYTE [DISTWIDTH] IN BLOOD BY AUTOMATED COUNT: 11.9 % (ref 11.5–14.5)
ERYTHROCYTE [DISTWIDTH] IN BLOOD BY AUTOMATED COUNT: 41.1 FL (ref 35–45)
GFR SERPL CREATININE-BSD FRML MDRD: > 90 ML/MIN/1.73M2
GLUCOSE BLD-MCNC: 125 MG/DL (ref 70–108)
GLUCOSE BLD-MCNC: 149 MG/DL (ref 70–108)
GLUCOSE BLD-MCNC: 158 MG/DL (ref 70–108)
HBA1C MFR BLD: 6.5 % (ref 4.4–6.4)
HCT VFR BLD CALC: 44 % (ref 37–47)
HEMOGLOBIN: 15 GM/DL (ref 12–16)
IMMATURE GRANS (ABS): 0.02 THOU/MM3 (ref 0–0.07)
IMMATURE GRANULOCYTES: 0.3 %
LYMPHOCYTES # BLD: 30.6 %
LYMPHOCYTES ABSOLUTE: 2.2 THOU/MM3 (ref 1–4.8)
MCH RBC QN AUTO: 31.9 PG (ref 26–33)
MCHC RBC AUTO-ENTMCNC: 34.1 GM/DL (ref 32.2–35.5)
MCV RBC AUTO: 93.6 FL (ref 81–99)
MONOCYTES # BLD: 8.8 %
MONOCYTES ABSOLUTE: 0.6 THOU/MM3 (ref 0.4–1.3)
NUCLEATED RED BLOOD CELLS: 0 /100 WBC
OSMOLALITY CALCULATION: 276.2 MOSMOL/KG (ref 275–300)
PLATELET # BLD: 288 THOU/MM3 (ref 130–400)
PMV BLD AUTO: 10.6 FL (ref 9.4–12.4)
POTASSIUM REFLEX MAGNESIUM: 3.9 MEQ/L (ref 3.5–5.2)
RBC # BLD: 4.7 MILL/MM3 (ref 4.2–5.4)
SEG NEUTROPHILS: 53.8 %
SEGMENTED NEUTROPHILS ABSOLUTE COUNT: 3.9 THOU/MM3 (ref 1.8–7.7)
SODIUM BLD-SCNC: 137 MEQ/L (ref 135–145)
TROPONIN T: < 0.01 NG/ML
WBC # BLD: 7.3 THOU/MM3 (ref 4.8–10.8)

## 2019-01-08 PROCEDURE — 83036 HEMOGLOBIN GLYCOSYLATED A1C: CPT

## 2019-01-08 PROCEDURE — 80048 BASIC METABOLIC PNL TOTAL CA: CPT

## 2019-01-08 PROCEDURE — 6370000000 HC RX 637 (ALT 250 FOR IP): Performed by: EMERGENCY MEDICINE

## 2019-01-08 PROCEDURE — 2580000003 HC RX 258: Performed by: INTERNAL MEDICINE

## 2019-01-08 PROCEDURE — 82948 REAGENT STRIP/BLOOD GLUCOSE: CPT

## 2019-01-08 PROCEDURE — 71045 X-RAY EXAM CHEST 1 VIEW: CPT

## 2019-01-08 PROCEDURE — 96375 TX/PRO/DX INJ NEW DRUG ADDON: CPT

## 2019-01-08 PROCEDURE — 96372 THER/PROPH/DIAG INJ SC/IM: CPT

## 2019-01-08 PROCEDURE — 36415 COLL VENOUS BLD VENIPUNCTURE: CPT

## 2019-01-08 PROCEDURE — 1200000003 HC TELEMETRY R&B

## 2019-01-08 PROCEDURE — 99285 EMERGENCY DEPT VISIT HI MDM: CPT

## 2019-01-08 PROCEDURE — 96366 THER/PROPH/DIAG IV INF ADDON: CPT

## 2019-01-08 PROCEDURE — 85025 COMPLETE CBC W/AUTO DIFF WBC: CPT

## 2019-01-08 PROCEDURE — 6370000000 HC RX 637 (ALT 250 FOR IP): Performed by: INTERNAL MEDICINE

## 2019-01-08 PROCEDURE — 6360000002 HC RX W HCPCS: Performed by: INTERNAL MEDICINE

## 2019-01-08 PROCEDURE — G0378 HOSPITAL OBSERVATION PER HR: HCPCS

## 2019-01-08 PROCEDURE — 93005 ELECTROCARDIOGRAM TRACING: CPT | Performed by: EMERGENCY MEDICINE

## 2019-01-08 PROCEDURE — 99220 PR INITIAL OBSERVATION CARE/DAY 70 MINUTES: CPT | Performed by: INTERNAL MEDICINE

## 2019-01-08 PROCEDURE — 99223 1ST HOSP IP/OBS HIGH 75: CPT | Performed by: INTERNAL MEDICINE

## 2019-01-08 PROCEDURE — 2500000003 HC RX 250 WO HCPCS: Performed by: EMERGENCY MEDICINE

## 2019-01-08 PROCEDURE — 93010 ELECTROCARDIOGRAM REPORT: CPT | Performed by: NUCLEAR MEDICINE

## 2019-01-08 PROCEDURE — 84484 ASSAY OF TROPONIN QUANT: CPT

## 2019-01-08 PROCEDURE — 96365 THER/PROPH/DIAG IV INF INIT: CPT

## 2019-01-08 RX ORDER — NITROGLYCERIN 20 MG/100ML
5 INJECTION INTRAVENOUS CONTINUOUS
Status: DISCONTINUED | OUTPATIENT
Start: 2019-01-08 | End: 2019-01-08

## 2019-01-08 RX ORDER — POTASSIUM CHLORIDE 750 MG/1
10 TABLET, EXTENDED RELEASE ORAL DAILY
Status: ON HOLD | COMMUNITY
End: 2019-01-10 | Stop reason: HOSPADM

## 2019-01-08 RX ORDER — AMLODIPINE BESYLATE 5 MG/1
5 TABLET ORAL DAILY
Status: DISCONTINUED | OUTPATIENT
Start: 2019-01-09 | End: 2019-01-10 | Stop reason: HOSPADM

## 2019-01-08 RX ORDER — ASPIRIN 81 MG/1
81 TABLET, CHEWABLE ORAL DAILY
Status: DISCONTINUED | OUTPATIENT
Start: 2019-01-09 | End: 2019-01-10 | Stop reason: HOSPADM

## 2019-01-08 RX ORDER — KETOROLAC TROMETHAMINE 30 MG/ML
15 INJECTION, SOLUTION INTRAMUSCULAR; INTRAVENOUS EVERY 6 HOURS
Status: COMPLETED | OUTPATIENT
Start: 2019-01-08 | End: 2019-01-09

## 2019-01-08 RX ORDER — PRAVASTATIN SODIUM 40 MG
40 TABLET ORAL NIGHTLY
Status: DISCONTINUED | OUTPATIENT
Start: 2019-01-09 | End: 2019-01-08

## 2019-01-08 RX ORDER — METOPROLOL SUCCINATE 25 MG/1
25 TABLET, EXTENDED RELEASE ORAL DAILY
Status: DISCONTINUED | OUTPATIENT
Start: 2019-01-09 | End: 2019-01-10 | Stop reason: HOSPADM

## 2019-01-08 RX ORDER — DEXTROSE MONOHYDRATE 25 G/50ML
12.5 INJECTION, SOLUTION INTRAVENOUS PRN
Status: DISCONTINUED | OUTPATIENT
Start: 2019-01-08 | End: 2019-01-10 | Stop reason: HOSPADM

## 2019-01-08 RX ORDER — ONDANSETRON 2 MG/ML
4 INJECTION INTRAMUSCULAR; INTRAVENOUS EVERY 6 HOURS PRN
Status: DISCONTINUED | OUTPATIENT
Start: 2019-01-08 | End: 2019-01-10 | Stop reason: HOSPADM

## 2019-01-08 RX ORDER — BUPROPION HYDROCHLORIDE 100 MG/1
300 TABLET ORAL
Status: ON HOLD | COMMUNITY
End: 2019-01-08 | Stop reason: SDUPTHER

## 2019-01-08 RX ORDER — CYCLOSPORINE 0.5 MG/ML
1 EMULSION OPHTHALMIC 2 TIMES DAILY
Status: DISCONTINUED | OUTPATIENT
Start: 2019-01-08 | End: 2019-01-08 | Stop reason: CLARIF

## 2019-01-08 RX ORDER — LISINOPRIL AND HYDROCHLOROTHIAZIDE 25; 20 MG/1; MG/1
1 TABLET ORAL DAILY
Status: DISCONTINUED | OUTPATIENT
Start: 2019-01-08 | End: 2019-01-08 | Stop reason: CLARIF

## 2019-01-08 RX ORDER — BUPROPION HYDROCHLORIDE 300 MG/1
300 TABLET ORAL EVERY MORNING
COMMUNITY

## 2019-01-08 RX ORDER — BUPROPION HYDROCHLORIDE 300 MG/1
300 TABLET ORAL EVERY MORNING
Status: DISCONTINUED | OUTPATIENT
Start: 2019-01-09 | End: 2019-01-10 | Stop reason: HOSPADM

## 2019-01-08 RX ORDER — POTASSIUM CHLORIDE 20MEQ/15ML
40 LIQUID (ML) ORAL PRN
Status: DISCONTINUED | OUTPATIENT
Start: 2019-01-08 | End: 2019-01-10 | Stop reason: HOSPADM

## 2019-01-08 RX ORDER — NITROGLYCERIN 0.4 MG/1
0.4 TABLET SUBLINGUAL EVERY 5 MIN PRN
Status: DISCONTINUED | OUTPATIENT
Start: 2019-01-08 | End: 2019-01-10 | Stop reason: HOSPADM

## 2019-01-08 RX ORDER — ACETAMINOPHEN 325 MG/1
650 TABLET ORAL EVERY 4 HOURS PRN
Status: DISCONTINUED | OUTPATIENT
Start: 2019-01-08 | End: 2019-01-10 | Stop reason: HOSPADM

## 2019-01-08 RX ORDER — POTASSIUM CHLORIDE 7.45 MG/ML
10 INJECTION INTRAVENOUS PRN
Status: DISCONTINUED | OUTPATIENT
Start: 2019-01-08 | End: 2019-01-10 | Stop reason: HOSPADM

## 2019-01-08 RX ORDER — TRAMADOL HYDROCHLORIDE 50 MG/1
50 TABLET ORAL EVERY 6 HOURS PRN
Status: DISCONTINUED | OUTPATIENT
Start: 2019-01-08 | End: 2019-01-10 | Stop reason: HOSPADM

## 2019-01-08 RX ORDER — LISINOPRIL 20 MG/1
20 TABLET ORAL DAILY
Status: DISCONTINUED | OUTPATIENT
Start: 2019-01-08 | End: 2019-01-10 | Stop reason: HOSPADM

## 2019-01-08 RX ORDER — FAMOTIDINE 20 MG/1
20 TABLET, FILM COATED ORAL 2 TIMES DAILY
Status: DISCONTINUED | OUTPATIENT
Start: 2019-01-08 | End: 2019-01-10 | Stop reason: HOSPADM

## 2019-01-08 RX ORDER — NICOTINE POLACRILEX 4 MG
15 LOZENGE BUCCAL PRN
Status: DISCONTINUED | OUTPATIENT
Start: 2019-01-08 | End: 2019-01-10 | Stop reason: HOSPADM

## 2019-01-08 RX ORDER — HYDROCHLOROTHIAZIDE 25 MG/1
25 TABLET ORAL DAILY
Status: DISCONTINUED | OUTPATIENT
Start: 2019-01-08 | End: 2019-01-10 | Stop reason: HOSPADM

## 2019-01-08 RX ORDER — DEXTROSE MONOHYDRATE 50 MG/ML
100 INJECTION, SOLUTION INTRAVENOUS PRN
Status: DISCONTINUED | OUTPATIENT
Start: 2019-01-08 | End: 2019-01-10 | Stop reason: HOSPADM

## 2019-01-08 RX ORDER — ASPIRIN 81 MG/1
81 TABLET ORAL DAILY
Status: DISCONTINUED | OUTPATIENT
Start: 2019-01-08 | End: 2019-01-08 | Stop reason: SDUPTHER

## 2019-01-08 RX ORDER — SODIUM CHLORIDE 9 MG/ML
INJECTION, SOLUTION INTRAVENOUS CONTINUOUS
Status: DISCONTINUED | OUTPATIENT
Start: 2019-01-08 | End: 2019-01-08

## 2019-01-08 RX ORDER — POTASSIUM CHLORIDE 20 MEQ/1
40 TABLET, EXTENDED RELEASE ORAL PRN
Status: DISCONTINUED | OUTPATIENT
Start: 2019-01-08 | End: 2019-01-10 | Stop reason: HOSPADM

## 2019-01-08 RX ORDER — NICOTINE 21 MG/24HR
1 PATCH, TRANSDERMAL 24 HOURS TRANSDERMAL DAILY
Status: DISCONTINUED | OUTPATIENT
Start: 2019-01-08 | End: 2019-01-10 | Stop reason: HOSPADM

## 2019-01-08 RX ORDER — METOPROLOL SUCCINATE 25 MG/1
25 TABLET, EXTENDED RELEASE ORAL SEE ADMIN INSTRUCTIONS
Status: DISCONTINUED | OUTPATIENT
Start: 2019-01-08 | End: 2019-01-08

## 2019-01-08 RX ORDER — SODIUM CHLORIDE 0.9 % (FLUSH) 0.9 %
10 SYRINGE (ML) INJECTION PRN
Status: DISCONTINUED | OUTPATIENT
Start: 2019-01-08 | End: 2019-01-10 | Stop reason: HOSPADM

## 2019-01-08 RX ORDER — BUPROPION HYDROCHLORIDE 100 MG/1
300 TABLET ORAL DAILY
Status: DISCONTINUED | OUTPATIENT
Start: 2019-01-09 | End: 2019-01-08

## 2019-01-08 RX ORDER — METHYLPREDNISOLONE SODIUM SUCCINATE 40 MG/ML
40 INJECTION, POWDER, LYOPHILIZED, FOR SOLUTION INTRAMUSCULAR; INTRAVENOUS EVERY 6 HOURS
Status: COMPLETED | OUTPATIENT
Start: 2019-01-08 | End: 2019-01-09

## 2019-01-08 RX ORDER — POLYVINYL ALCOHOL 14 MG/ML
1 SOLUTION/ DROPS OPHTHALMIC 2 TIMES DAILY
Status: DISCONTINUED | OUTPATIENT
Start: 2019-01-08 | End: 2019-01-10 | Stop reason: HOSPADM

## 2019-01-08 RX ORDER — PRAVASTATIN SODIUM 40 MG
40 TABLET ORAL NIGHTLY
Status: DISCONTINUED | OUTPATIENT
Start: 2019-01-08 | End: 2019-01-10 | Stop reason: HOSPADM

## 2019-01-08 RX ORDER — PRAVASTATIN SODIUM 20 MG
20 TABLET ORAL DAILY
Status: DISCONTINUED | OUTPATIENT
Start: 2019-01-08 | End: 2019-01-08

## 2019-01-08 RX ORDER — SODIUM CHLORIDE 0.9 % (FLUSH) 0.9 %
10 SYRINGE (ML) INJECTION EVERY 12 HOURS SCHEDULED
Status: DISCONTINUED | OUTPATIENT
Start: 2019-01-08 | End: 2019-01-10 | Stop reason: HOSPADM

## 2019-01-08 RX ADMIN — LISINOPRIL 20 MG: 20 TABLET ORAL at 15:27

## 2019-01-08 RX ADMIN — HYDROCHLOROTHIAZIDE 25 MG: 25 TABLET ORAL at 15:27

## 2019-01-08 RX ADMIN — POLYVINYL ALCOHOL 1 DROP: 14 SOLUTION/ DROPS OPHTHALMIC at 21:06

## 2019-01-08 RX ADMIN — PRAVASTATIN SODIUM 40 MG: 40 TABLET ORAL at 22:36

## 2019-01-08 RX ADMIN — SODIUM CHLORIDE: 9 INJECTION, SOLUTION INTRAVENOUS at 15:27

## 2019-01-08 RX ADMIN — ENOXAPARIN SODIUM 40 MG: 40 INJECTION SUBCUTANEOUS at 21:06

## 2019-01-08 RX ADMIN — METHYLPREDNISOLONE SODIUM SUCCINATE 40 MG: 40 INJECTION, POWDER, FOR SOLUTION INTRAMUSCULAR; INTRAVENOUS at 20:59

## 2019-01-08 RX ADMIN — KETOROLAC TROMETHAMINE 15 MG: 30 INJECTION, SOLUTION INTRAMUSCULAR at 21:06

## 2019-01-08 RX ADMIN — FAMOTIDINE 20 MG: 20 TABLET ORAL at 21:06

## 2019-01-08 RX ADMIN — NITROGLYCERIN 0.4 MG: 0.4 TABLET SUBLINGUAL at 10:40

## 2019-01-08 RX ADMIN — NITROGLYCERIN 5 MCG/MIN: 20 INJECTION INTRAVENOUS at 11:10

## 2019-01-08 ASSESSMENT — PAIN DESCRIPTION - PAIN TYPE
TYPE: ACUTE PAIN

## 2019-01-08 ASSESSMENT — PAIN DESCRIPTION - ONSET
ONSET: AWAKENED FROM SLEEP
ONSET: ON-GOING

## 2019-01-08 ASSESSMENT — PAIN DESCRIPTION - DESCRIPTORS
DESCRIPTORS: CONSTANT
DESCRIPTORS: CONSTANT
DESCRIPTORS: CRUSHING
DESCRIPTORS: CONSTANT

## 2019-01-08 ASSESSMENT — PAIN DESCRIPTION - LOCATION
LOCATION: CHEST

## 2019-01-08 ASSESSMENT — PAIN SCALES - GENERAL
PAINLEVEL_OUTOF10: 8
PAINLEVEL_OUTOF10: 4
PAINLEVEL_OUTOF10: 4
PAINLEVEL_OUTOF10: 6
PAINLEVEL_OUTOF10: 0
PAINLEVEL_OUTOF10: 5

## 2019-01-08 ASSESSMENT — PAIN DESCRIPTION - PROGRESSION: CLINICAL_PROGRESSION: GRADUALLY IMPROVING

## 2019-01-08 ASSESSMENT — PAIN DESCRIPTION - ORIENTATION: ORIENTATION: LEFT

## 2019-01-09 ENCOUNTER — APPOINTMENT (OUTPATIENT)
Dept: NON INVASIVE DIAGNOSTICS | Age: 59
DRG: 313 | End: 2019-01-09
Payer: MEDICARE

## 2019-01-09 LAB
ANION GAP SERPL CALCULATED.3IONS-SCNC: 11 MEQ/L (ref 8–16)
BUN BLDV-MCNC: 16 MG/DL (ref 7–22)
CALCIUM SERPL-MCNC: 9.7 MG/DL (ref 8.5–10.5)
CHLORIDE BLD-SCNC: 101 MEQ/L (ref 98–111)
CO2: 24 MEQ/L (ref 23–33)
CREAT SERPL-MCNC: 0.7 MG/DL (ref 0.4–1.2)
EKG ATRIAL RATE: 92 BPM
EKG P AXIS: 56 DEGREES
EKG P-R INTERVAL: 178 MS
EKG Q-T INTERVAL: 376 MS
EKG QRS DURATION: 80 MS
EKG QTC CALCULATION (BAZETT): 464 MS
EKG R AXIS: 12 DEGREES
EKG T AXIS: 44 DEGREES
EKG VENTRICULAR RATE: 92 BPM
ERYTHROCYTE [DISTWIDTH] IN BLOOD BY AUTOMATED COUNT: 11.7 % (ref 11.5–14.5)
ERYTHROCYTE [DISTWIDTH] IN BLOOD BY AUTOMATED COUNT: 39.6 FL (ref 35–45)
GFR SERPL CREATININE-BSD FRML MDRD: > 90 ML/MIN/1.73M2
GLUCOSE BLD-MCNC: 194 MG/DL (ref 70–108)
GLUCOSE BLD-MCNC: 206 MG/DL (ref 70–108)
GLUCOSE BLD-MCNC: 227 MG/DL (ref 70–108)
GLUCOSE BLD-MCNC: 232 MG/DL (ref 70–108)
GLUCOSE BLD-MCNC: 242 MG/DL (ref 70–108)
HCT VFR BLD CALC: 44.4 % (ref 37–47)
HEMOGLOBIN: 15.1 GM/DL (ref 12–16)
LV EF: 50 %
LVEF MODALITY: NORMAL
MAGNESIUM: 1.8 MG/DL (ref 1.6–2.4)
MCH RBC QN AUTO: 31.5 PG (ref 26–33)
MCHC RBC AUTO-ENTMCNC: 34 GM/DL (ref 32.2–35.5)
MCV RBC AUTO: 92.7 FL (ref 81–99)
PLATELET # BLD: 290 THOU/MM3 (ref 130–400)
PMV BLD AUTO: 10.7 FL (ref 9.4–12.4)
POTASSIUM REFLEX MAGNESIUM: 4.4 MEQ/L (ref 3.5–5.2)
RBC # BLD: 4.79 MILL/MM3 (ref 4.2–5.4)
SODIUM BLD-SCNC: 136 MEQ/L (ref 135–145)
WBC # BLD: 6.8 THOU/MM3 (ref 4.8–10.8)

## 2019-01-09 PROCEDURE — 2580000003 HC RX 258: Performed by: INTERNAL MEDICINE

## 2019-01-09 PROCEDURE — A9500 TC99M SESTAMIBI: HCPCS | Performed by: INTERNAL MEDICINE

## 2019-01-09 PROCEDURE — 96376 TX/PRO/DX INJ SAME DRUG ADON: CPT

## 2019-01-09 PROCEDURE — G0378 HOSPITAL OBSERVATION PER HR: HCPCS

## 2019-01-09 PROCEDURE — 1200000003 HC TELEMETRY R&B

## 2019-01-09 PROCEDURE — 93306 TTE W/DOPPLER COMPLETE: CPT

## 2019-01-09 PROCEDURE — 2709999900 HC NON-CHARGEABLE SUPPLY

## 2019-01-09 PROCEDURE — 6370000000 HC RX 637 (ALT 250 FOR IP): Performed by: INTERNAL MEDICINE

## 2019-01-09 PROCEDURE — 6360000002 HC RX W HCPCS: Performed by: INTERNAL MEDICINE

## 2019-01-09 PROCEDURE — 80048 BASIC METABOLIC PNL TOTAL CA: CPT

## 2019-01-09 PROCEDURE — 93010 ELECTROCARDIOGRAM REPORT: CPT | Performed by: NUCLEAR MEDICINE

## 2019-01-09 PROCEDURE — 99232 SBSQ HOSP IP/OBS MODERATE 35: CPT | Performed by: INTERNAL MEDICINE

## 2019-01-09 PROCEDURE — 82948 REAGENT STRIP/BLOOD GLUCOSE: CPT

## 2019-01-09 PROCEDURE — 93017 CV STRESS TEST TRACING ONLY: CPT | Performed by: INTERNAL MEDICINE

## 2019-01-09 PROCEDURE — 6360000002 HC RX W HCPCS

## 2019-01-09 PROCEDURE — 85027 COMPLETE CBC AUTOMATED: CPT

## 2019-01-09 PROCEDURE — 96372 THER/PROPH/DIAG INJ SC/IM: CPT

## 2019-01-09 PROCEDURE — 83735 ASSAY OF MAGNESIUM: CPT

## 2019-01-09 PROCEDURE — 36415 COLL VENOUS BLD VENIPUNCTURE: CPT

## 2019-01-09 PROCEDURE — 78452 HT MUSCLE IMAGE SPECT MULT: CPT

## 2019-01-09 PROCEDURE — 3430000000 HC RX DIAGNOSTIC RADIOPHARMACEUTICAL: Performed by: INTERNAL MEDICINE

## 2019-01-09 PROCEDURE — 93005 ELECTROCARDIOGRAM TRACING: CPT | Performed by: INTERNAL MEDICINE

## 2019-01-09 RX ADMIN — HYDROCHLOROTHIAZIDE 25 MG: 25 TABLET ORAL at 08:11

## 2019-01-09 RX ADMIN — Medication 11 MILLICURIE: at 09:20

## 2019-01-09 RX ADMIN — METOPROLOL SUCCINATE 25 MG: 25 TABLET, FILM COATED, EXTENDED RELEASE ORAL at 08:11

## 2019-01-09 RX ADMIN — POLYVINYL ALCOHOL 1 DROP: 14 SOLUTION/ DROPS OPHTHALMIC at 12:22

## 2019-01-09 RX ADMIN — ASPIRIN 81 MG: 81 TABLET, CHEWABLE ORAL at 08:13

## 2019-01-09 RX ADMIN — FAMOTIDINE 20 MG: 20 TABLET ORAL at 20:28

## 2019-01-09 RX ADMIN — Medication 34.4 MILLICURIE: at 10:15

## 2019-01-09 RX ADMIN — METHYLPREDNISOLONE SODIUM SUCCINATE 40 MG: 40 INJECTION, POWDER, FOR SOLUTION INTRAMUSCULAR; INTRAVENOUS at 02:40

## 2019-01-09 RX ADMIN — Medication 2 UNITS: at 16:24

## 2019-01-09 RX ADMIN — Medication 10 ML: at 20:28

## 2019-01-09 RX ADMIN — AMLODIPINE BESYLATE 5 MG: 5 TABLET ORAL at 08:11

## 2019-01-09 RX ADMIN — KETOROLAC TROMETHAMINE 15 MG: 30 INJECTION, SOLUTION INTRAMUSCULAR at 08:19

## 2019-01-09 RX ADMIN — LISINOPRIL 20 MG: 20 TABLET ORAL at 08:11

## 2019-01-09 RX ADMIN — FAMOTIDINE 20 MG: 20 TABLET ORAL at 08:15

## 2019-01-09 RX ADMIN — Medication 2 UNITS: at 12:23

## 2019-01-09 RX ADMIN — ENOXAPARIN SODIUM 40 MG: 40 INJECTION SUBCUTANEOUS at 20:28

## 2019-01-09 RX ADMIN — TRAMADOL HYDROCHLORIDE 50 MG: 50 TABLET, FILM COATED ORAL at 16:32

## 2019-01-09 RX ADMIN — Medication 10 ML: at 08:24

## 2019-01-09 RX ADMIN — Medication 2 UNITS: at 07:26

## 2019-01-09 RX ADMIN — INSULIN LISPRO 1 UNITS: 100 INJECTION, SOLUTION INTRAVENOUS; SUBCUTANEOUS at 20:28

## 2019-01-09 RX ADMIN — PRAVASTATIN SODIUM 40 MG: 40 TABLET ORAL at 20:28

## 2019-01-09 RX ADMIN — POLYVINYL ALCOHOL 1 DROP: 14 SOLUTION/ DROPS OPHTHALMIC at 20:28

## 2019-01-09 RX ADMIN — KETOROLAC TROMETHAMINE 15 MG: 30 INJECTION, SOLUTION INTRAMUSCULAR at 02:35

## 2019-01-09 RX ADMIN — BUPROPION HYDROCHLORIDE 300 MG: 300 TABLET, EXTENDED RELEASE ORAL at 08:11

## 2019-01-09 ASSESSMENT — PAIN SCALES - GENERAL
PAINLEVEL_OUTOF10: 0
PAINLEVEL_OUTOF10: 4
PAINLEVEL_OUTOF10: 0
PAINLEVEL_OUTOF10: 7
PAINLEVEL_OUTOF10: 0
PAINLEVEL_OUTOF10: 2

## 2019-01-10 VITALS
RESPIRATION RATE: 16 BRPM | HEIGHT: 66 IN | WEIGHT: 186.4 LBS | HEART RATE: 90 BPM | DIASTOLIC BLOOD PRESSURE: 65 MMHG | BODY MASS INDEX: 29.96 KG/M2 | TEMPERATURE: 97.6 F | SYSTOLIC BLOOD PRESSURE: 131 MMHG | OXYGEN SATURATION: 94 %

## 2019-01-10 LAB
GLUCOSE BLD-MCNC: 147 MG/DL (ref 70–108)
GLUCOSE BLD-MCNC: 151 MG/DL (ref 70–108)

## 2019-01-10 PROCEDURE — 2580000003 HC RX 258: Performed by: INTERNAL MEDICINE

## 2019-01-10 PROCEDURE — 82948 REAGENT STRIP/BLOOD GLUCOSE: CPT

## 2019-01-10 PROCEDURE — G0378 HOSPITAL OBSERVATION PER HR: HCPCS

## 2019-01-10 PROCEDURE — 6370000000 HC RX 637 (ALT 250 FOR IP): Performed by: INTERNAL MEDICINE

## 2019-01-10 PROCEDURE — 99239 HOSP IP/OBS DSCHRG MGMT >30: CPT | Performed by: INTERNAL MEDICINE

## 2019-01-10 PROCEDURE — 99232 SBSQ HOSP IP/OBS MODERATE 35: CPT | Performed by: PHYSICIAN ASSISTANT

## 2019-01-10 RX ORDER — POTASSIUM CHLORIDE 20 MEQ/1
10 TABLET, EXTENDED RELEASE ORAL DAILY
Qty: 60 TABLET | Refills: 3 | Status: SHIPPED
Start: 2019-01-10

## 2019-01-10 RX ORDER — POTASSIUM CHLORIDE 20 MEQ/1
40 TABLET, EXTENDED RELEASE ORAL PRN
Qty: 60 TABLET | Refills: 3 | Status: SHIPPED | OUTPATIENT
Start: 2019-01-10 | End: 2019-01-10

## 2019-01-10 RX ADMIN — Medication 10 ML: at 08:18

## 2019-01-10 RX ADMIN — LISINOPRIL 20 MG: 20 TABLET ORAL at 08:11

## 2019-01-10 RX ADMIN — Medication 1 UNITS: at 08:16

## 2019-01-10 RX ADMIN — POLYVINYL ALCOHOL 1 DROP: 14 SOLUTION/ DROPS OPHTHALMIC at 08:17

## 2019-01-10 RX ADMIN — ASPIRIN 81 MG: 81 TABLET, CHEWABLE ORAL at 08:15

## 2019-01-10 RX ADMIN — HYDROCHLOROTHIAZIDE 25 MG: 25 TABLET ORAL at 08:11

## 2019-01-10 RX ADMIN — AMLODIPINE BESYLATE 5 MG: 5 TABLET ORAL at 08:11

## 2019-01-10 RX ADMIN — BUPROPION HYDROCHLORIDE 300 MG: 300 TABLET, EXTENDED RELEASE ORAL at 08:11

## 2019-01-10 RX ADMIN — FAMOTIDINE 20 MG: 20 TABLET ORAL at 08:15

## 2019-01-10 RX ADMIN — METOPROLOL SUCCINATE 25 MG: 25 TABLET, FILM COATED, EXTENDED RELEASE ORAL at 08:11

## 2019-01-10 ASSESSMENT — PAIN SCALES - GENERAL: PAINLEVEL_OUTOF10: 0

## 2019-01-14 ENCOUNTER — OFFICE VISIT (OUTPATIENT)
Dept: CARDIOLOGY CLINIC | Age: 59
End: 2019-01-14
Payer: MEDICARE

## 2019-01-14 VITALS
HEIGHT: 66 IN | WEIGHT: 194 LBS | BODY MASS INDEX: 31.18 KG/M2 | DIASTOLIC BLOOD PRESSURE: 72 MMHG | HEART RATE: 84 BPM | SYSTOLIC BLOOD PRESSURE: 134 MMHG

## 2019-01-14 DIAGNOSIS — E78.00 PURE HYPERCHOLESTEROLEMIA: ICD-10-CM

## 2019-01-14 DIAGNOSIS — I10 ESSENTIAL HYPERTENSION: Primary | ICD-10-CM

## 2019-01-14 DIAGNOSIS — Z72.0 TOBACCO ABUSE: ICD-10-CM

## 2019-01-14 PROBLEM — R07.9 CHEST PAIN ON EXERTION: Status: RESOLVED | Noted: 2019-01-08 | Resolved: 2019-01-14

## 2019-01-14 PROCEDURE — 4004F PT TOBACCO SCREEN RCVD TLK: CPT | Performed by: INTERNAL MEDICINE

## 2019-01-14 PROCEDURE — 3017F COLORECTAL CA SCREEN DOC REV: CPT | Performed by: INTERNAL MEDICINE

## 2019-01-14 PROCEDURE — 1111F DSCHRG MED/CURRENT MED MERGE: CPT | Performed by: INTERNAL MEDICINE

## 2019-01-14 PROCEDURE — G8598 ASA/ANTIPLAT THER USED: HCPCS | Performed by: INTERNAL MEDICINE

## 2019-01-14 PROCEDURE — 99214 OFFICE O/P EST MOD 30 MIN: CPT | Performed by: INTERNAL MEDICINE

## 2019-01-14 PROCEDURE — G8417 CALC BMI ABV UP PARAM F/U: HCPCS | Performed by: INTERNAL MEDICINE

## 2019-01-14 PROCEDURE — G8427 DOCREV CUR MEDS BY ELIG CLIN: HCPCS | Performed by: INTERNAL MEDICINE

## 2019-01-14 PROCEDURE — G8484 FLU IMMUNIZE NO ADMIN: HCPCS | Performed by: INTERNAL MEDICINE

## 2019-02-05 ENCOUNTER — HOSPITAL ENCOUNTER (OUTPATIENT)
Age: 59
Discharge: HOME | End: 2019-02-05
Payer: MEDICARE

## 2019-02-05 ENCOUNTER — OFFICE VISIT (OUTPATIENT)
Dept: INTERNAL MEDICINE CLINIC | Age: 59
End: 2019-02-05
Payer: MEDICARE

## 2019-02-05 VITALS
HEART RATE: 72 BPM | BODY MASS INDEX: 30.86 KG/M2 | WEIGHT: 192 LBS | DIASTOLIC BLOOD PRESSURE: 76 MMHG | SYSTOLIC BLOOD PRESSURE: 130 MMHG | HEIGHT: 66 IN

## 2019-02-05 DIAGNOSIS — E04.2 MULTINODULAR GOITER: ICD-10-CM

## 2019-02-05 DIAGNOSIS — E11.9 WELL CONTROLLED TYPE 2 DIABETES MELLITUS (HCC): Primary | ICD-10-CM

## 2019-02-05 DIAGNOSIS — J44.9 CHRONIC OBSTRUCTIVE PULMONARY DISEASE, UNSPECIFIED COPD TYPE (HCC): ICD-10-CM

## 2019-02-05 DIAGNOSIS — E78.00 HIGH CHOLESTEROL: ICD-10-CM

## 2019-02-05 DIAGNOSIS — E11.9 TYPE 2 DIABETES MELLITUS WITHOUT COMPLICATION, WITHOUT LONG-TERM CURRENT USE OF INSULIN (HCC): ICD-10-CM

## 2019-02-05 DIAGNOSIS — Z72.0 TOBACCO ABUSE: ICD-10-CM

## 2019-02-05 DIAGNOSIS — I10 ESSENTIAL HYPERTENSION: ICD-10-CM

## 2019-02-05 LAB
CREATININE, URINE: 108.3 MG/DL
MICROALBUMIN UR-MCNC: < 1.2 MG/DL
MICROALBUMIN/CREAT UR-RTO: 11 MG/G (ref 0–30)

## 2019-02-05 PROCEDURE — 4004F PT TOBACCO SCREEN RCVD TLK: CPT | Performed by: INTERNAL MEDICINE

## 2019-02-05 PROCEDURE — 3044F HG A1C LEVEL LT 7.0%: CPT | Performed by: INTERNAL MEDICINE

## 2019-02-05 PROCEDURE — G8427 DOCREV CUR MEDS BY ELIG CLIN: HCPCS | Performed by: INTERNAL MEDICINE

## 2019-02-05 PROCEDURE — 82043 UR ALBUMIN QUANTITATIVE: CPT

## 2019-02-05 PROCEDURE — 3023F SPIROM DOC REV: CPT | Performed by: INTERNAL MEDICINE

## 2019-02-05 PROCEDURE — 2022F DILAT RTA XM EVC RTNOPTHY: CPT | Performed by: INTERNAL MEDICINE

## 2019-02-05 PROCEDURE — G8484 FLU IMMUNIZE NO ADMIN: HCPCS | Performed by: INTERNAL MEDICINE

## 2019-02-05 PROCEDURE — G8926 SPIRO NO PERF OR DOC: HCPCS | Performed by: INTERNAL MEDICINE

## 2019-02-05 PROCEDURE — 1111F DSCHRG MED/CURRENT MED MERGE: CPT | Performed by: INTERNAL MEDICINE

## 2019-02-05 PROCEDURE — 99214 OFFICE O/P EST MOD 30 MIN: CPT | Performed by: INTERNAL MEDICINE

## 2019-02-05 PROCEDURE — G8417 CALC BMI ABV UP PARAM F/U: HCPCS | Performed by: INTERNAL MEDICINE

## 2019-02-05 PROCEDURE — 3017F COLORECTAL CA SCREEN DOC REV: CPT | Performed by: INTERNAL MEDICINE

## 2019-02-05 PROCEDURE — G8598 ASA/ANTIPLAT THER USED: HCPCS | Performed by: INTERNAL MEDICINE

## 2019-02-13 ENCOUNTER — HOSPITAL ENCOUNTER (OUTPATIENT)
Age: 59
Setting detail: OUTPATIENT SURGERY
Discharge: HOME | End: 2019-02-13
Attending: INTERNAL MEDICINE | Admitting: INTERNAL MEDICINE
Payer: MEDICARE

## 2019-02-13 ENCOUNTER — ANESTHESIA (OUTPATIENT)
Dept: ENDOSCOPY | Age: 59
End: 2019-02-13
Payer: MEDICARE

## 2019-02-13 ENCOUNTER — ANESTHESIA EVENT (OUTPATIENT)
Dept: ENDOSCOPY | Age: 59
End: 2019-02-13
Payer: MEDICARE

## 2019-02-13 VITALS
HEART RATE: 78 BPM | SYSTOLIC BLOOD PRESSURE: 151 MMHG | TEMPERATURE: 97.9 F | WEIGHT: 189.8 LBS | OXYGEN SATURATION: 98 % | HEIGHT: 66 IN | BODY MASS INDEX: 30.5 KG/M2 | RESPIRATION RATE: 18 BRPM | DIASTOLIC BLOOD PRESSURE: 71 MMHG

## 2019-02-13 VITALS
OXYGEN SATURATION: 100 % | RESPIRATION RATE: 19 BRPM | SYSTOLIC BLOOD PRESSURE: 140 MMHG | DIASTOLIC BLOOD PRESSURE: 65 MMHG

## 2019-02-13 PROCEDURE — 2500000003 HC RX 250 WO HCPCS: Performed by: ANESTHESIOLOGY

## 2019-02-13 PROCEDURE — 3700000001 HC ADD 15 MINUTES (ANESTHESIA): Performed by: INTERNAL MEDICINE

## 2019-02-13 PROCEDURE — 7100000001 HC PACU RECOVERY - ADDTL 15 MIN: Performed by: INTERNAL MEDICINE

## 2019-02-13 PROCEDURE — 88305 TISSUE EXAM BY PATHOLOGIST: CPT

## 2019-02-13 PROCEDURE — 6360000002 HC RX W HCPCS: Performed by: ANESTHESIOLOGY

## 2019-02-13 PROCEDURE — 3609010600 HC COLONOSCOPY POLYPECTOMY SNARE/COLD BIOPSY: Performed by: INTERNAL MEDICINE

## 2019-02-13 PROCEDURE — 2709999900 HC NON-CHARGEABLE SUPPLY: Performed by: INTERNAL MEDICINE

## 2019-02-13 PROCEDURE — 2580000003 HC RX 258: Performed by: INTERNAL MEDICINE

## 2019-02-13 PROCEDURE — 3700000000 HC ANESTHESIA ATTENDED CARE: Performed by: INTERNAL MEDICINE

## 2019-02-13 PROCEDURE — 7100000000 HC PACU RECOVERY - FIRST 15 MIN: Performed by: INTERNAL MEDICINE

## 2019-02-13 PROCEDURE — C1773 RET DEV, INSERTABLE: HCPCS | Performed by: INTERNAL MEDICINE

## 2019-02-13 RX ORDER — LIDOCAINE HYDROCHLORIDE 20 MG/ML
INJECTION, SOLUTION INFILTRATION; PERINEURAL PRN
Status: DISCONTINUED | OUTPATIENT
Start: 2019-02-13 | End: 2019-02-13 | Stop reason: SDUPTHER

## 2019-02-13 RX ORDER — PROPOFOL 10 MG/ML
INJECTION, EMULSION INTRAVENOUS PRN
Status: DISCONTINUED | OUTPATIENT
Start: 2019-02-13 | End: 2019-02-13 | Stop reason: SDUPTHER

## 2019-02-13 RX ORDER — ALBUTEROL SULFATE 90 UG/1
2 AEROSOL, METERED RESPIRATORY (INHALATION) EVERY 6 HOURS PRN
COMMUNITY

## 2019-02-13 RX ORDER — SODIUM CHLORIDE 450 MG/100ML
INJECTION, SOLUTION INTRAVENOUS CONTINUOUS
Status: DISCONTINUED | OUTPATIENT
Start: 2019-02-13 | End: 2019-02-13 | Stop reason: HOSPADM

## 2019-02-13 RX ADMIN — PROPOFOL 30 MG: 10 INJECTION, EMULSION INTRAVENOUS at 12:43

## 2019-02-13 RX ADMIN — PROPOFOL 30 MG: 10 INJECTION, EMULSION INTRAVENOUS at 12:48

## 2019-02-13 RX ADMIN — SODIUM CHLORIDE: 4.5 INJECTION, SOLUTION INTRAVENOUS at 12:03

## 2019-02-13 RX ADMIN — PROPOFOL 20 MG: 10 INJECTION, EMULSION INTRAVENOUS at 12:55

## 2019-02-13 RX ADMIN — PROPOFOL 30 MG: 10 INJECTION, EMULSION INTRAVENOUS at 12:51

## 2019-02-13 RX ADMIN — PROPOFOL 30 MG: 10 INJECTION, EMULSION INTRAVENOUS at 12:41

## 2019-02-13 RX ADMIN — PROPOFOL 30 MG: 10 INJECTION, EMULSION INTRAVENOUS at 12:45

## 2019-02-13 RX ADMIN — PROPOFOL 30 MG: 10 INJECTION, EMULSION INTRAVENOUS at 12:44

## 2019-02-13 RX ADMIN — PROPOFOL 30 MG: 10 INJECTION, EMULSION INTRAVENOUS at 12:49

## 2019-02-13 RX ADMIN — PROPOFOL 30 MG: 10 INJECTION, EMULSION INTRAVENOUS at 12:46

## 2019-02-13 RX ADMIN — LIDOCAINE HYDROCHLORIDE 100 MG: 20 INJECTION, SOLUTION INFILTRATION; PERINEURAL at 12:40

## 2019-02-13 RX ADMIN — PROPOFOL 40 MG: 10 INJECTION, EMULSION INTRAVENOUS at 12:53

## 2019-02-13 RX ADMIN — PROPOFOL 50 MG: 10 INJECTION, EMULSION INTRAVENOUS at 12:40

## 2019-02-13 ASSESSMENT — PAIN SCALES - GENERAL
PAINLEVEL_OUTOF10: 0

## 2019-02-13 ASSESSMENT — ENCOUNTER SYMPTOMS: SHORTNESS OF BREATH: 1

## 2019-06-28 ENCOUNTER — HOSPITAL ENCOUNTER (EMERGENCY)
Age: 59
Discharge: HOME | End: 2019-06-28
Payer: MEDICARE

## 2019-06-28 ENCOUNTER — APPOINTMENT (OUTPATIENT)
Dept: GENERAL RADIOLOGY | Age: 59
End: 2019-06-28
Payer: MEDICARE

## 2019-06-28 VITALS
RESPIRATION RATE: 16 BRPM | OXYGEN SATURATION: 98 % | TEMPERATURE: 98 F | SYSTOLIC BLOOD PRESSURE: 146 MMHG | HEART RATE: 98 BPM | DIASTOLIC BLOOD PRESSURE: 83 MMHG

## 2019-06-28 DIAGNOSIS — M54.9 ACUTE UPPER BACK PAIN: Primary | ICD-10-CM

## 2019-06-28 DIAGNOSIS — J01.90 ACUTE NON-RECURRENT SINUSITIS, UNSPECIFIED LOCATION: ICD-10-CM

## 2019-06-28 PROCEDURE — 6370000000 HC RX 637 (ALT 250 FOR IP): Performed by: PHYSICIAN ASSISTANT

## 2019-06-28 PROCEDURE — 99283 EMERGENCY DEPT VISIT LOW MDM: CPT

## 2019-06-28 PROCEDURE — 72040 X-RAY EXAM NECK SPINE 2-3 VW: CPT

## 2019-06-28 RX ORDER — CYCLOBENZAPRINE HCL 10 MG
10 TABLET ORAL 3 TIMES DAILY PRN
Qty: 10 TABLET | Refills: 0 | Status: SHIPPED | OUTPATIENT
Start: 2019-06-28 | End: 2019-07-16 | Stop reason: ALTCHOICE

## 2019-06-28 RX ORDER — FLUTICASONE PROPIONATE 50 MCG
1 SPRAY, SUSPENSION (ML) NASAL 2 TIMES DAILY
Qty: 1 BOTTLE | Refills: 0 | Status: SHIPPED | OUTPATIENT
Start: 2019-06-28 | End: 2019-08-06 | Stop reason: ALTCHOICE

## 2019-06-28 RX ORDER — GUAIFENESIN 1200 MG/1
1200 TABLET, EXTENDED RELEASE ORAL 2 TIMES DAILY
Qty: 20 TABLET | Refills: 0 | Status: SHIPPED | OUTPATIENT
Start: 2019-06-28 | End: 2019-08-06 | Stop reason: ALTCHOICE

## 2019-06-28 RX ORDER — LORATADINE 10 MG/1
10 TABLET ORAL DAILY
Qty: 10 TABLET | Refills: 0 | Status: SHIPPED | OUTPATIENT
Start: 2019-06-28 | End: 2019-07-08

## 2019-06-28 RX ORDER — NAPROXEN 500 MG/1
500 TABLET ORAL 2 TIMES DAILY
Qty: 10 TABLET | Refills: 0 | Status: SHIPPED | OUTPATIENT
Start: 2019-06-28 | End: 2019-07-16 | Stop reason: ALTCHOICE

## 2019-06-28 RX ORDER — HYDROCODONE BITARTRATE AND ACETAMINOPHEN 5; 325 MG/1; MG/1
1 TABLET ORAL ONCE
Status: COMPLETED | OUTPATIENT
Start: 2019-06-28 | End: 2019-06-28

## 2019-06-28 RX ADMIN — HYDROCODONE BITARTRATE AND ACETAMINOPHEN 1 TABLET: 5; 325 TABLET ORAL at 12:24

## 2019-06-28 ASSESSMENT — PAIN SCALES - GENERAL
PAINLEVEL_OUTOF10: 9
PAINLEVEL_OUTOF10: 9

## 2019-06-28 ASSESSMENT — ENCOUNTER SYMPTOMS
COUGH: 0
SHORTNESS OF BREATH: 0
RHINORRHEA: 1
SORE THROAT: 0
ABDOMINAL PAIN: 0
WHEEZING: 0
VOMITING: 0
EYE DISCHARGE: 0
EYE PAIN: 0
BACK PAIN: 0
NAUSEA: 0
DIARRHEA: 0

## 2019-06-28 ASSESSMENT — PAIN DESCRIPTION - PAIN TYPE: TYPE: ACUTE PAIN

## 2019-06-28 ASSESSMENT — PAIN DESCRIPTION - DESCRIPTORS: DESCRIPTORS: ACHING

## 2019-06-28 ASSESSMENT — PAIN DESCRIPTION - ORIENTATION: ORIENTATION: UPPER

## 2019-06-28 ASSESSMENT — PAIN DESCRIPTION - LOCATION: LOCATION: BACK

## 2019-06-28 NOTE — ED PROVIDER NOTES
light-headedness, numbness and headaches. Hematological: Negative for adenopathy. Psychiatric/Behavioral: Negative for confusion and suicidal ideas. The patient is not nervous/anxious. PAST MEDICAL HISTORY    has a past medical history of Arthritis, Asthma, Atypical chest pain, Bell's palsy, COPD (chronic obstructive pulmonary disease) ([address] Costochondritis, Diabetes mellitus ([address] Fatigue, FH: CAD (coronary artery disease), High blood pressure, High cholesterol, Joint pain, Nonobstructive CAD (coronary artery disease), Pseudoaneurysm ([address] Tobacco abuse, and Wheezing. SURGICAL HISTORY      has a past surgical history that includes partial hysterectomy (cervix not removed) (1999); Rotator cuff repair (Bilateral, 2007); cardiovascular stress test (10 02 2009); doppler echocardiography (10 02 2009); Diagnostic Cardiac Cath Lab Procedure (10 12 2009); back surgery (12/05/2016); Colonoscopy; and Colonoscopy (N/A, 2/13/2019).     CURRENT MEDICATIONS       Discharge Medication List as of 6/28/2019 12:19 PM      CONTINUE these medications which have NOT CHANGED    Details   albuterol sulfate HFA (PROAIR HFA) 108 (90 Base) MCG/ACT inhaler Inhale 2 puffs into the lungs every 6 hours as needed for WheezingHistorical Med      potassium chloride (KLOR-CON M) 20 MEQ extended release tablet Take 0.5 tablets by mouth daily, Disp-60 tablet, R-3Adjust Sig      buPROPion (WELLBUTRIN XL) 300 MG extended release tablet Take 300 mg by mouth every morningHistorical Med      carboxymethylcellulose 1 % ophthalmic solution Place 1-2 drops into both eyes as needed for Dry EyesHistorical Med      glucose blood VI test strips (FREESTYLE LITE) strip Disp-90 strip, R-1, NormalTesting once daily      cycloSPORINE (RESTASIS) 0.05 % ophthalmic emulsion Place 1 drop into both eyes 2 times daily Historical Med      metoprolol (TOPROL-XL) 25 MG XL tablet Take 25 mg by mouth daily Historical Med      tramadol (ULTRAM) 50 MG tablet Take 100 mg by mouth every 8 hours as needed for Pain. [employer] Historical Med      amlodipine (NORVASC) 5 MG tablet Take 5 mg by mouth daily. sitagliptan-metformin (JANUMET)  MG per tablet Take 1 tablet by mouth 2 times daily. pravastatin (PRAVACHOL) 40 MG tablet Take 40 mg by mouth daily Historical Med      aspirin 81 MG EC tablet Take 81 mg by mouth daily. ALLERGIES     is allergic to morphine. FAMILY HISTORY     indicated that her mother is . She indicated that both of her brothers are . family history includes Heart Defect in her brother and brother; Heart Disease in her mother; Heart Disease (age of onset: 43) in her sister; Heart Disease (age of onset: 55) in her sister; High Cholesterol in her mother; Hypertension in her mother; Pacemaker in her mother. SOCIAL HISTORY    reports that she has been smoking cigarettes. She has a 70.00 pack-year smoking history. She has never used smokeless tobacco. She reports that she drinks alcohol. She reports that she does not use drugs. PHYSICAL EXAM     INITIAL VITALS:  temperature is 98 °F (36.7 °C). Her blood pressure is 146/83 (abnormal) and her pulse is 98. Her respiration is 16 and oxygen saturation is 98%. Physical Exam   Constitutional: Vital signs are normal. She appears well-developed and well-nourished. She is active and cooperative. Non-toxic appearance. No distress. HENT:   Head: Normocephalic and atraumatic. Right Ear: Hearing normal.   Left Ear: Hearing normal.   Nose: Nose normal. No rhinorrhea. No epistaxis. Mouth/Throat: Uvula is midline, oropharynx is clear and moist and mucous membranes are normal.   Eyes: Pupils are equal, round, and reactive to light. Conjunctivae and EOM are normal.   Neck: No JVD present. No neck rigidity. No tracheal deviation present. Cardiovascular: Normal rate, regular rhythm and normal heart sounds.    Pulses:       Radial pulses are 2+ on the right side, and 2+ on the left side. Dorsalis pedis pulses are 2+ on the right side, and 2+ on the left side. Posterior tibial pulses are 2+ on the right side, and 2+ on the left side. Pulmonary/Chest: Effort normal and breath sounds normal. No respiratory distress. She has no decreased breath sounds. She has no wheezes. Abdominal: Normal appearance. She exhibits no distension and no pulsatile midline mass. There is no tenderness. There is no rigidity, no rebound, no guarding and no CVA tenderness. No hernia. Musculoskeletal: Normal range of motion. Cervical back: She exhibits tenderness. She exhibits normal range of motion, no bony tenderness, no swelling, no edema, no deformity, no laceration and no spasm. Thoracic back: She exhibits tenderness. She exhibits normal range of motion, no bony tenderness, no swelling, no edema, no deformity, no laceration and no spasm. Well perfused; good strength appreciated in all muscle groups; there is good plantar and dorsiflexion of the feet and toes. SLR is negative in the supine position. Lymphadenopathy:     She has no cervical adenopathy. Neurological: She is alert. She has normal strength. No sensory deficit. Coordination and gait normal. GCS eye subscore is 4. GCS verbal subscore is 5. GCS motor subscore is 6. There is no saddle anesthesia. Skin: Skin is warm, dry and intact. No rash noted. She is not diaphoretic. No pallor. Psychiatric: She has a normal mood and affect. Her speech is normal and behavior is normal. Thought content normal. Cognition and memory are normal.   Nursing note and vitals reviewed.       DIFFERENTIAL DIAGNOSIS:   Including but not limited to: chronic back pain, DDD, spinal stenosis, osteophytes    DIAGNOSTIC RESULTS     EKG: All E[facility]'s are [facility] who either signs or Co-signs this chart in the absence of a cardiologist.  None    RADIOLOGY: non-plain film images(s) such as CT,Ultrasound and returning to the Emergency Department immediately if new or worsening symptoms occur. We have discussed the symptoms which are most concerning (e.g., numbness or weakness of the arms or legs, saddle anesthesia, urinary or bowel incontinence or retention, changing or worsening pain) that necessitate immediate return. CRITICAL CARE:   None    CONSULTS:  None    PROCEDURES:  None    FINAL IMPRESSION      1. Acute upper back pain    2. Acute non-recurrent sinusitis, unspecified location          DISPOSITION/PLAN     1. Acute upper back pain    2. Acute non-recurrent sinusitis, unspecified location        PATIENT REFERRED TO:  [id number] [id number]  [phone]    Schedule an appointment as soon as possible for a visit in 3 days        DISCHARGE MEDICATIONS:  Discharge Medication List as of 6/28/2019 12:19 PM      START taking these medications    Details   cyclobenzaprine (FLEXERIL) 10 MG tablet Take 1 tablet by mouth 3 times daily as needed for Muscle spasms, Disp-10 tablet, R-0Print      naproxen (NAPROSYN) 500 MG tablet Take 1 tablet by mouth 2 times daily, Disp-10 tablet, R-0Print      guaiFENesin 1200 MG TB12 Take 1,200 mg by mouth 2 times daily, Disp-20 tablet, R-0Print      loratadine (CLARITIN) 10 MG tablet Take 1 tablet by mouth daily for 10 days, Disp-10 tablet, R-0Print      fluticasone (FLONASE) 50 MCG/ACT nasal spray 1 spray by Nasal route 2 times daily for 10 days, Disp-1 Bottle, R-0Print             (Please note that portions of this note were completed with a voice recognition program.  Efforts were made to edit the dictations but occasionally words are mis-transcribed.)    Scribe:  Lucia Weston 6/28/19 12:16 PM Scribing for and in the presence of ANDREW Mendoza.     Signed by: Ida Sellers, 06/28/19 5:31 PM    Provider:  I personally performed the services described in the documentation, reviewed and edited the documentation which was

## 2019-07-16 ENCOUNTER — OFFICE VISIT (OUTPATIENT)
Dept: CARDIOLOGY CLINIC | Age: 59
End: 2019-07-16
Payer: MEDICARE

## 2019-07-16 VITALS
HEART RATE: 93 BPM | DIASTOLIC BLOOD PRESSURE: 74 MMHG | HEIGHT: 66 IN | BODY MASS INDEX: 29.89 KG/M2 | WEIGHT: 186 LBS | SYSTOLIC BLOOD PRESSURE: 130 MMHG

## 2019-07-16 DIAGNOSIS — I10 ESSENTIAL HYPERTENSION: Primary | ICD-10-CM

## 2019-07-16 DIAGNOSIS — E78.00 PURE HYPERCHOLESTEROLEMIA: ICD-10-CM

## 2019-07-16 DIAGNOSIS — Z72.0 TOBACCO ABUSE: ICD-10-CM

## 2019-07-16 PROBLEM — F41.9 ANXIETY: Status: ACTIVE | Noted: 2017-06-14

## 2019-07-16 PROBLEM — M51.36 DEGENERATION OF LUMBAR INTERVERTEBRAL DISC: Status: ACTIVE | Noted: 2017-06-14

## 2019-07-16 PROBLEM — M51.369 DEGENERATION OF LUMBAR INTERVERTEBRAL DISC: Status: ACTIVE | Noted: 2017-06-14

## 2019-07-16 PROCEDURE — 4004F PT TOBACCO SCREEN RCVD TLK: CPT | Performed by: INTERNAL MEDICINE

## 2019-07-16 PROCEDURE — G8417 CALC BMI ABV UP PARAM F/U: HCPCS | Performed by: INTERNAL MEDICINE

## 2019-07-16 PROCEDURE — 3017F COLORECTAL CA SCREEN DOC REV: CPT | Performed by: INTERNAL MEDICINE

## 2019-07-16 PROCEDURE — G8598 ASA/ANTIPLAT THER USED: HCPCS | Performed by: INTERNAL MEDICINE

## 2019-07-16 PROCEDURE — 99214 OFFICE O/P EST MOD 30 MIN: CPT | Performed by: INTERNAL MEDICINE

## 2019-07-16 PROCEDURE — G8427 DOCREV CUR MEDS BY ELIG CLIN: HCPCS | Performed by: INTERNAL MEDICINE

## 2019-07-16 NOTE — PROGRESS NOTES
disorder. Respiratory ROS: no cough,  or wheezing, the rest see HPI  Cardiovascular ROS: See HPI  Gastrointestinal ROS: negative  Genito-Urinary ROS: no dysuria, trouble voiding, or hematuria  Musculoskeletal ROS: negative  Neurological ROS: no TIA or stroke symptoms  Dermatological ROS: negative      Blood pressure 130/74, pulse 93, height 5' 6\" (1.676 m), weight 186 lb (84.4 kg), not currently breastfeeding. Physical Examination:    General appearance - alert, well appearing, and in no distress  HEENT- Pink conjunctiva  , Non-icteri sclera,PERRLA  Mental status - alert, oriented to person, place, and time  Neck - supple, no significant adenopathy, no JVD, or carotid bruits  Chest - clear to auscultation, no wheezes, rales or rhonchi, symmetric air entry  Heart - normal rate, regular rhythm, normal S1, S2, no murmurs, rubs, clicks or gallops  Abdomen - soft, nontender, nondistended, no masses or organomegaly  CARLENE- no CVA or flank tenderness, no suprapubic tenderness  Neurological - alert, oriented, normal speech, no focal findings or movement disorder noted  Musculoskeletal/limbs - no joint tenderness, deformity or swelling   - peripheral pulses normal, no pedal edema, no clubbing or cyanosis  Skin - normal coloration and turgor, no rashes, no suspicious skin lesions noted  Psych- appropriate mood and affect    Lab  No results for input(s): CKTOTAL, CKMB, CKMBINDEX, TROPONINI in the last 72 hours.   CBC:   Lab Results   Component Value Date    WBC 6.8 01/09/2019    RBC 4.79 01/09/2019    RBC 4.46 11/11/2011    HGB 15.1 01/09/2019    HCT 44.4 01/09/2019    MCV 92.7 01/09/2019    MCH 31.5 01/09/2019    MCHC 34.0 01/09/2019    RDW 13.2 05/25/2018     01/09/2019    MPV 10.7 01/09/2019     BMP:    Lab Results   Component Value Date     01/09/2019    K 4.4 01/09/2019     01/09/2019    CO2 24 01/09/2019    BUN 16 01/09/2019    LABALBU 4.2 05/25/2018    LABALBU 4.4 10/27/2011    CREATININE 0.7 01/09/2019    CALCIUM 9.7 01/09/2019    LABGLOM >90 01/09/2019    GLUCOSE 232 01/09/2019    GLUCOSE 100 10/30/2011     Hepatic Function Panel:    Lab Results   Component Value Date    ALKPHOS 90 05/25/2018    ALT 17 05/25/2018    AST 15 05/25/2018    PROT 6.9 05/25/2018    BILITOT 0.4 05/25/2018    BILIDIR <0.2 03/07/2018    LABALBU 4.2 05/25/2018    LABALBU 4.4 10/27/2011     Magnesium:    Lab Results   Component Value Date    MG 1.8 01/09/2019     Warfarin PT/INR:  No components found for: PTPATWAR, PTINRWAR  HgBA1c:    Lab Results   Component Value Date    LABA1C 6.5 01/08/2019     FLP:    Lab Results   Component Value Date    TRIG 117 05/25/2018    HDL 44 05/25/2018    LDLCALC 69 05/25/2018     TSH:    Lab Results   Component Value Date    TSH 0.620 08/23/2018      She had a cardiac  catheterization in 2009 and normal LV function and no significant  obstructive lesion. Widely patent and dominant RCA; diagonal large  caliber, widely patent; LAD, widely patent; large vessel and left  circumflex, widely patent. So basically, all the arteries are widely  patent in the cardiac catheterization then. Assessment   Diagnosis Orders   1. Essential hypertension     2. Pure hypercholesterolemia     3. Tobacco abuse             Recent admission DX    ASSESSMENT:  1. Atypical chest pain, appears to be more musculoskeletal with chest  wall tenderness. 2.  COPD. 3.  Hypertension. 4.  Hyperlipidemia. 5.  History of diabetes.       Plan     meds and labs reviewed    Continue the current treatment and with constant vigilance to changes in symptoms and also any potential side effects. Return for care or seek medical attention immediately if symptoms got worse and/or develop new symptoms. Hypertension, on medical treatment. Seems to be under good control. Patient is compliant with medical treatment. Hyperlipidemia: on statins, followed periodically. Patient need periodic lipid and liver profile.     Smoking: discussed with the patient the importance of smoke cessation especially with the risk of CAD.     Lipid panel and liver function test before next appointment    Doing well    No cp for a while      RTC in 12 months        [facility]un [facility]

## 2019-08-06 ENCOUNTER — OFFICE VISIT (OUTPATIENT)
Dept: INTERNAL MEDICINE CLINIC | Age: 59
End: 2019-08-06
Payer: MEDICARE

## 2019-08-06 VITALS
DIASTOLIC BLOOD PRESSURE: 72 MMHG | HEIGHT: 66 IN | WEIGHT: 190.2 LBS | HEART RATE: 94 BPM | BODY MASS INDEX: 30.57 KG/M2 | SYSTOLIC BLOOD PRESSURE: 120 MMHG

## 2019-08-06 DIAGNOSIS — J44.9 CHRONIC OBSTRUCTIVE PULMONARY DISEASE, UNSPECIFIED COPD TYPE (HCC): ICD-10-CM

## 2019-08-06 DIAGNOSIS — E11.9 WELL CONTROLLED TYPE 2 DIABETES MELLITUS (HCC): ICD-10-CM

## 2019-08-06 DIAGNOSIS — E04.1 THYROID NODULE: ICD-10-CM

## 2019-08-06 DIAGNOSIS — E04.2 MULTINODULAR GOITER: Primary | ICD-10-CM

## 2019-08-06 DIAGNOSIS — I10 ESSENTIAL HYPERTENSION: ICD-10-CM

## 2019-08-06 LAB — HBA1C MFR BLD: 6.7 % (ref 4.3–5.7)

## 2019-08-06 PROCEDURE — 83036 HEMOGLOBIN GLYCOSYLATED A1C: CPT | Performed by: INTERNAL MEDICINE

## 2019-08-06 PROCEDURE — 4004F PT TOBACCO SCREEN RCVD TLK: CPT | Performed by: INTERNAL MEDICINE

## 2019-08-06 PROCEDURE — 99214 OFFICE O/P EST MOD 30 MIN: CPT | Performed by: INTERNAL MEDICINE

## 2019-08-06 PROCEDURE — G8417 CALC BMI ABV UP PARAM F/U: HCPCS | Performed by: INTERNAL MEDICINE

## 2019-08-06 PROCEDURE — G8427 DOCREV CUR MEDS BY ELIG CLIN: HCPCS | Performed by: INTERNAL MEDICINE

## 2019-08-06 PROCEDURE — 2022F DILAT RTA XM EVC RTNOPTHY: CPT | Performed by: INTERNAL MEDICINE

## 2019-08-06 PROCEDURE — G8926 SPIRO NO PERF OR DOC: HCPCS | Performed by: INTERNAL MEDICINE

## 2019-08-06 PROCEDURE — 3044F HG A1C LEVEL LT 7.0%: CPT | Performed by: INTERNAL MEDICINE

## 2019-08-06 PROCEDURE — G8598 ASA/ANTIPLAT THER USED: HCPCS | Performed by: INTERNAL MEDICINE

## 2019-08-06 PROCEDURE — 3023F SPIROM DOC REV: CPT | Performed by: INTERNAL MEDICINE

## 2019-08-06 PROCEDURE — 3017F COLORECTAL CA SCREEN DOC REV: CPT | Performed by: INTERNAL MEDICINE

## 2019-08-06 ASSESSMENT — PATIENT HEALTH QUESTIONNAIRE - PHQ9
1. LITTLE INTEREST OR PLEASURE IN DOING THINGS: 0
SUM OF ALL RESPONSES TO PHQ QUESTIONS 1-9: 0
2. FEELING DOWN, DEPRESSED OR HOPELESS: 0
SUM OF ALL RESPONSES TO PHQ9 QUESTIONS 1 & 2: 0
SUM OF ALL RESPONSES TO PHQ QUESTIONS 1-9: 0

## 2019-08-06 NOTE — PROGRESS NOTES
carboxymethylcellulose 1 % ophthalmic solution Place 1-2 drops into both eyes as needed for Dry Eyes      glucose blood VI test strips (FREESTYLE LITE) strip Testing once daily 90 strip 1    metoprolol (TOPROL-XL) 25 MG XL tablet Take 25 mg by mouth daily       tramadol (ULTRAM) 50 MG tablet Take 100 mg by mouth every 8 hours as needed for Pain. Cherylle Coaster amlodipine (NORVASC) 5 MG tablet Take 5 mg by mouth daily.  sitagliptan-metformin (JANUMET)  MG per tablet Take 1 tablet by mouth 2 times daily.  pravastatin (PRAVACHOL) 40 MG tablet Take 40 mg by mouth daily       aspirin 81 MG EC tablet Take 81 mg by mouth daily. No current facility-administered medications for this visit. Review of Systems -   General ROS: Positive for fatigue  Psychological ROS: Positive for anxiety and depressed mood but declines wanting to talk with anyone about it   Hematological and Lymphatic ROS: negative  Respiratory ROS: positive for - shortness of breath  Cardiovascular ROS: no chest pain or dyspnea on exertion  Gastrointestinal ROS: no abdominal pain, change in bowel habits, or black or bloody stools  Genito-Urinary ROS: no dysuria, trouble voiding, or hematuria  Musculoskeletal ROS: negative  Neurological ROS: no TIA or stroke symptoms  Dermatological ROS: negative    Blood pressure 120/72, pulse 94, height 5' 6\" (1.676 m), weight 190 lb 3.2 oz (86.3 kg), not currently breastfeeding.     Physical Examination: General appearance - alert, well appearing, and in no distress  Mental status - alert, oriented to person, place, and time  Neck - supple, no significant adenopathy  Chest - rhonchi noted bilaterally  Heart - normal rate, regular rhythm, normal S1, S2, no murmurs, rubs, clicks or gallops  Abdomen - soft, nontender, nondistended, no masses or organomegaly  Neurological - alert, oriented, normal speech, no focal findings or movement disorder noted  Musculoskeletal - no joint tenderness, deformity

## 2019-09-07 ENCOUNTER — APPOINTMENT (OUTPATIENT)
Dept: CT IMAGING | Age: 59
End: 2019-09-07
Payer: MEDICARE

## 2019-09-07 ENCOUNTER — APPOINTMENT (OUTPATIENT)
Dept: GENERAL RADIOLOGY | Age: 59
End: 2019-09-07
Payer: MEDICARE

## 2019-09-07 ENCOUNTER — HOSPITAL ENCOUNTER (EMERGENCY)
Age: 59
Discharge: HOME | End: 2019-09-07
Payer: MEDICARE

## 2019-09-07 VITALS
BODY MASS INDEX: 30.86 KG/M2 | HEART RATE: 110 BPM | HEIGHT: 66 IN | RESPIRATION RATE: 16 BRPM | OXYGEN SATURATION: 95 % | WEIGHT: 192 LBS | TEMPERATURE: 99.4 F | DIASTOLIC BLOOD PRESSURE: 72 MMHG | SYSTOLIC BLOOD PRESSURE: 138 MMHG

## 2019-09-07 DIAGNOSIS — J06.9 VIRAL URI WITH COUGH: ICD-10-CM

## 2019-09-07 DIAGNOSIS — R51.9 ACUTE NONINTRACTABLE HEADACHE, UNSPECIFIED HEADACHE TYPE: ICD-10-CM

## 2019-09-07 DIAGNOSIS — R52 BODY ACHES: Primary | ICD-10-CM

## 2019-09-07 LAB
ALBUMIN SERPL-MCNC: 4.5 G/DL (ref 3.5–5.1)
ALP BLD-CCNC: 107 U/L (ref 38–126)
ALT SERPL-CCNC: 32 U/L (ref 11–66)
ANION GAP SERPL CALCULATED.3IONS-SCNC: 14 MEQ/L (ref 8–16)
AST SERPL-CCNC: 23 U/L (ref 5–40)
BASOPHILS # BLD: 0.1 %
BASOPHILS ABSOLUTE: 0 THOU/MM3 (ref 0–0.1)
BILIRUB SERPL-MCNC: 0.3 MG/DL (ref 0.3–1.2)
BILIRUBIN DIRECT: < 0.2 MG/DL (ref 0–0.3)
BUN BLDV-MCNC: 7 MG/DL (ref 7–22)
CALCIUM SERPL-MCNC: 9.2 MG/DL (ref 8.5–10.5)
CHLORIDE BLD-SCNC: 97 MEQ/L (ref 98–111)
CO2: 23 MEQ/L (ref 23–33)
CREAT SERPL-MCNC: 0.7 MG/DL (ref 0.4–1.2)
EOSINOPHIL # BLD: 1.1 %
EOSINOPHILS ABSOLUTE: 0.1 THOU/MM3 (ref 0–0.4)
ERYTHROCYTE [DISTWIDTH] IN BLOOD BY AUTOMATED COUNT: 12.1 % (ref 11.5–14.5)
ERYTHROCYTE [DISTWIDTH] IN BLOOD BY AUTOMATED COUNT: 40.8 FL (ref 35–45)
FLU A ANTIGEN: NEGATIVE
FLU B ANTIGEN: NEGATIVE
GFR SERPL CREATININE-BSD FRML MDRD: > 90 ML/MIN/1.73M2
GLUCOSE BLD-MCNC: 138 MG/DL (ref 70–108)
GROUP A STREP CULTURE, REFLEX: NEGATIVE
HCT VFR BLD CALC: 42.9 % (ref 37–47)
HEMOGLOBIN: 14.7 GM/DL (ref 12–16)
IMMATURE GRANS (ABS): 0.02 THOU/MM3 (ref 0–0.07)
IMMATURE GRANULOCYTES: 0 %
LYMPHOCYTES # BLD: 20.1 %
LYMPHOCYTES ABSOLUTE: 1.4 THOU/MM3 (ref 1–4.8)
MCH RBC QN AUTO: 31.6 PG (ref 26–33)
MCHC RBC AUTO-ENTMCNC: 34.3 GM/DL (ref 32.2–35.5)
MCV RBC AUTO: 92.3 FL (ref 81–99)
MONOCYTES # BLD: 15.8 %
MONOCYTES ABSOLUTE: 1.1 THOU/MM3 (ref 0.4–1.3)
NUCLEATED RED BLOOD CELLS: 0 /100 WBC
OSMOLALITY CALCULATION: 268.4 MOSMOL/KG (ref 275–300)
PLATELET # BLD: 148 THOU/MM3 (ref 130–400)
PMV BLD AUTO: 10.5 FL (ref 9.4–12.4)
POTASSIUM SERPL-SCNC: 3.5 MEQ/L (ref 3.5–5.2)
PROCALCITONIN: 0.12 NG/ML (ref 0.01–0.09)
RBC # BLD: 4.65 MILL/MM3 (ref 4.2–5.4)
REFLEX THROAT C + S: NORMAL
SEG NEUTROPHILS: 62.6 %
SEGMENTED NEUTROPHILS ABSOLUTE COUNT: 4.4 THOU/MM3 (ref 1.8–7.7)
SODIUM BLD-SCNC: 134 MEQ/L (ref 135–145)
TOTAL PROTEIN: 7.3 G/DL (ref 6.1–8)
WBC # BLD: 7.1 THOU/MM3 (ref 4.8–10.8)

## 2019-09-07 PROCEDURE — 80053 COMPREHEN METABOLIC PANEL: CPT

## 2019-09-07 PROCEDURE — 70450 CT HEAD/BRAIN W/O DYE: CPT

## 2019-09-07 PROCEDURE — 71046 X-RAY EXAM CHEST 2 VIEWS: CPT

## 2019-09-07 PROCEDURE — 36415 COLL VENOUS BLD VENIPUNCTURE: CPT

## 2019-09-07 PROCEDURE — 87070 CULTURE OTHR SPECIMN AEROBIC: CPT

## 2019-09-07 PROCEDURE — 85025 COMPLETE CBC W/AUTO DIFF WBC: CPT

## 2019-09-07 PROCEDURE — 99284 EMERGENCY DEPT VISIT MOD MDM: CPT

## 2019-09-07 PROCEDURE — 87880 STREP A ASSAY W/OPTIC: CPT

## 2019-09-07 PROCEDURE — 82248 BILIRUBIN DIRECT: CPT

## 2019-09-07 PROCEDURE — 6370000000 HC RX 637 (ALT 250 FOR IP): Performed by: PHYSICIAN ASSISTANT

## 2019-09-07 PROCEDURE — 84145 PROCALCITONIN (PCT): CPT

## 2019-09-07 PROCEDURE — 87804 INFLUENZA ASSAY W/OPTIC: CPT

## 2019-09-07 RX ORDER — ACETAMINOPHEN 500 MG
500 TABLET ORAL EVERY 6 HOURS PRN
Qty: 120 TABLET | Refills: 0 | Status: SHIPPED | OUTPATIENT
Start: 2019-09-07

## 2019-09-07 RX ORDER — BENZONATATE 100 MG/1
100 CAPSULE ORAL 3 TIMES DAILY PRN
Qty: 30 CAPSULE | Refills: 0 | Status: SHIPPED | OUTPATIENT
Start: 2019-09-07 | End: 2019-09-10 | Stop reason: HOSPADM

## 2019-09-07 RX ORDER — METHYLPREDNISOLONE SODIUM SUCCINATE 125 MG/2ML
80 INJECTION, POWDER, LYOPHILIZED, FOR SOLUTION INTRAMUSCULAR; INTRAVENOUS ONCE
Status: DISCONTINUED | OUTPATIENT
Start: 2019-09-07 | End: 2019-09-07 | Stop reason: HOSPADM

## 2019-09-07 RX ORDER — PREDNISONE 20 MG/1
20 TABLET ORAL 2 TIMES DAILY
Qty: 10 TABLET | Refills: 0 | Status: SHIPPED | OUTPATIENT
Start: 2019-09-07 | End: 2019-09-12

## 2019-09-07 RX ORDER — ACETAMINOPHEN 325 MG/1
650 TABLET ORAL ONCE
Status: COMPLETED | OUTPATIENT
Start: 2019-09-07 | End: 2019-09-07

## 2019-09-07 RX ORDER — POLYMYXIN B SULFATE AND TRIMETHOPRIM 1; 10000 MG/ML; [USP'U]/ML
1 SOLUTION OPHTHALMIC EVERY 4 HOURS
Qty: 30 ML | Refills: 0 | Status: SHIPPED | OUTPATIENT
Start: 2019-09-07 | End: 2019-09-07

## 2019-09-07 RX ADMIN — ACETAMINOPHEN 650 MG: 325 TABLET ORAL at 19:52

## 2019-09-07 ASSESSMENT — ENCOUNTER SYMPTOMS
ABDOMINAL PAIN: 0
BACK PAIN: 0
DIARRHEA: 0
COUGH: 1
CHOKING: 0
CHEST TIGHTNESS: 0
NAUSEA: 0
STRIDOR: 0
SHORTNESS OF BREATH: 0
SORE THROAT: 0
VOMITING: 0

## 2019-09-07 ASSESSMENT — PAIN DESCRIPTION - DESCRIPTORS: DESCRIPTORS: SHARP

## 2019-09-07 ASSESSMENT — PAIN DESCRIPTION - ORIENTATION: ORIENTATION: RIGHT

## 2019-09-07 ASSESSMENT — PAIN DESCRIPTION - ONSET: ONSET: GRADUAL

## 2019-09-07 ASSESSMENT — PAIN DESCRIPTION - FREQUENCY: FREQUENCY: INTERMITTENT

## 2019-09-07 ASSESSMENT — PAIN DESCRIPTION - LOCATION: LOCATION: HEAD

## 2019-09-07 ASSESSMENT — PAIN SCALES - GENERAL
PAINLEVEL_OUTOF10: 10
PAINLEVEL_OUTOF10: 10

## 2019-09-07 ASSESSMENT — PAIN DESCRIPTION - PAIN TYPE: TYPE: ACUTE PAIN

## 2019-09-07 NOTE — ED PROVIDER NOTES
chest tightness, shortness of breath and stridor. Cardiovascular: Negative for chest pain. Gastrointestinal: Negative for abdominal pain, diarrhea, nausea and vomiting. Musculoskeletal: Positive for myalgias. Negative for arthralgias, back pain, gait problem, joint swelling, neck pain and neck stiffness. Neurological: Positive for facial asymmetry (right-sided, chronic and unchanged from baseline) and headaches. Negative for dizziness, tremors, syncope, speech difficulty, weakness, light-headedness and numbness. PAST MEDICAL HISTORY    has a past medical history of Arthritis, Asthma, Atypical chest pain, Bell's palsy, COPD (chronic obstructive pulmonary disease) ([address] Costochondritis, Diabetes mellitus ([address] Fatigue, FH: CAD (coronary artery disease), High blood pressure, High cholesterol, Joint pain, Nonobstructive CAD (coronary artery disease), Pseudoaneurysm ([address] Tobacco abuse, and Wheezing. SURGICAL HISTORY      has a past surgical history that includes partial hysterectomy (cervix not removed) (1999); Rotator cuff repair (Bilateral, 2007); cardiovascular stress test (10 02 2009); doppler echocardiography (10 02 2009); Diagnostic Cardiac Cath Lab Procedure (10 12 2009); back surgery (12/05/2016); Colonoscopy; and Colonoscopy (N/A, 2/13/2019). CURRENT MEDICATIONS       Previous Medications    ALBUTEROL SULFATE HFA (PROAIR HFA) 108 (90 BASE) MCG/ACT INHALER    Inhale 2 puffs into the lungs every 6 hours as needed for Wheezing    AMLODIPINE (NORVASC) 5 MG TABLET    Take 5 mg by mouth daily. ASPIRIN 81 MG EC TABLET    Take 81 mg by mouth daily.       BUPROPION (WELLBUTRIN XL) 300 MG EXTENDED RELEASE TABLET    Take 300 mg by mouth every morning    CARBOXYMETHYLCELLULOSE 1 % OPHTHALMIC SOLUTION    Place 1-2 drops into both eyes as needed for Dry Eyes    GLUCOSE BLOOD VI TEST STRIPS (FREESTYLE LITE) STRIP    Testing once daily    LIFITEGRAST (XIIDRA OP)    Apply to eye    METOPROLOL membrane is not injected, not perforated, not erythematous and not bulging. No middle ear effusion. Nose: Nose normal. No mucosal edema or rhinorrhea. Right sinus exhibits no maxillary sinus tenderness and no frontal sinus tenderness. Left sinus exhibits no maxillary sinus tenderness and no frontal sinus tenderness. Mouth/Throat: Uvula is midline, oropharynx is clear and moist and mucous membranes are normal. No oral lesions. No trismus in the jaw. No uvula swelling. No oropharyngeal exudate, posterior oropharyngeal edema or posterior oropharyngeal erythema. Patient's left ear canal is occluded with cerumen. Eyes: Pupils are equal, round, and reactive to light. Conjunctivae and EOM are normal. Right eye exhibits no discharge. Left eye exhibits no discharge. No scleral icterus. Neck: Normal range of motion and full passive range of motion without pain. Neck supple. No spinous process tenderness and no muscular tenderness present. No neck rigidity. No edema, no erythema and normal range of motion present. No Brudzinski's sign and no Kernig's sign noted. Cardiovascular: Regular rhythm and normal heart sounds. Tachycardia present. Exam reveals no gallop and no friction rub. No murmur heard. Pulmonary/Chest: Effort normal and breath sounds normal. No stridor. No respiratory distress. She has no wheezes. She has no rales. She exhibits no tenderness. Abdominal: Soft. Bowel sounds are normal. She exhibits no distension and no mass. There is no hepatosplenomegaly. There is no tenderness. There is no rigidity, no rebound and no guarding. No hernia. Musculoskeletal: Normal range of motion. She exhibits no edema, tenderness or deformity. Lymphadenopathy:     She has no cervical adenopathy. Neurological: She is alert and oriented to person, place, and time. No cranial nerve deficit. She exhibits normal muscle tone. Coordination normal. GCS eye subscore is 4. GCS verbal subscore is 5.  GCS motor subscore is 6.   There were no noted cranial nerve or focal neurologic deficits. Cranial nerves II through XII are grossly intact. There is a very mild right facial droop involving the corner of the patient's mouth, which she states is chronic and unchanged from baseline. There is no aphasia or dysarthria. Patient has full ROM and 5/5 strength of the extremities. There is intact sensation of soft touch in the extremities. The extremities appear to be neurovascularly intact. Meningeal signs are negative. Skin: Skin is warm and dry. No rash noted. She is not diaphoretic. No erythema. No pallor. Psychiatric: She has a normal mood and affect. Her behavior is normal. Thought content normal.   Nursing note and vitals reviewed. DIFFERENTIAL DIAGNOSIS:   Including but not limited to: Viral URI, influenza, bronchitis, pneumonia, rhinitis, sinusitis, tension headache, migraine headache, CVA, ICH. Low suspicion for meningitis based on history and physical exam.    DIAGNOSTIC RESULTS     EKG: All EKG's are [facility] who either signs or Co-signs this chart in the absence of a cardiologist.  None    RADIOLOGY: non-plain film images(s) such as CT,Ultrasound and MRI are read by the radiologist.  Plain radiographic images are visualized and preliminarily interpreted by the emergency physician unless otherwise stated below. CT Head WO Contrast   Final Result    No acute intracranial hemorrhage, mass effect or midline shift. **This report has been created using voice recognition software. It may contain minor errors which are inherent in voice recognition technology. **      Final report electronically signed by Dr Odessa Reddy on 9/7/2019 7:35 PM      XR CHEST STANDARD (2 VW)   Final Result   There is no acute intrathoracic process. **This report has been created using voice recognition software.  It may contain minor errors which are inherent in voice recognition department revealed  no acute hemorrhage, infarct, mass, or other acute cranial or intracranial pathology. CXR revealed no acute intrapulmonary process, infiltrations, effusions, or consolidations. Laboratory work was reassuring. Procalcitonin is noted to be 0.12, although white blood cell count is within normal range. Influenza and strep swabs were negative. Within the department, the patient was treated with Tylenol. I observed the patient's condition to improve during the duration of the stay. The patient declined consideration of LP at this time because of the low clinical suspicion for meningitis. Risks of refusal were discussed, such as undiagnosed meningitis. The patient vocalized understanding of the risks of refusal.    I explained to the patient that I believe her URI symptoms to be viral in origin and that I do not believe the use of antibiotics is appropriate or likely to be beneficial. The patient vocalized understanding of and agreement with this assessment. The patient will continue symptomatic treatment and ensure adequate hydration and food intake. I explained my proposed course of treatment to the patient, who was amenable to my treatment and discharge decisions. The patient was discharged home in stable condition with prescriptions for Tylenol, Tessalon Perles, and prednisone, and the patient will return to the ED if the symptoms become more severe in nature or otherwise change. CRITICAL CARE:   None    CONSULTS:  Discussed the case with my attending physician in the Emergency Department, Dr. Jose Quezada, who agreed with my workup, treatment, and disposition decisions. Dr. Jose Quezada does not advise obtaining CTAs at this time, as he has no clinical suspicion for an acute intracranial pathologic process. He does not advise prescribing antibiotics at this time due to the likelihood of this being a viral URI. PROCEDURES:  None    FINAL IMPRESSION      1. Body aches    2.  Viral URI with cough 3. Acute nonintractable headache, unspecified headache type          DISPOSITION/PLAN   I have given the patient strict written and verbal instructions about care at home, follow-up, and signs and symptoms of worsening of condition, and the patient did verbalize understanding of these instructions. Patient was discharged in stable condition. Will return if symptoms change or worsen, or for any sign or symptom deemed emergent by the patient or family members. Follow up as an outpatient or sooner if symptoms warrant. PATIENT REFERRED TO:  Eduardo Everett MD  [address]  [phone]    Call in 3 days  As needed, If symptoms worsen    [facility] EMERGENCY DEPT  [address]  Go today  If symptoms worsen      DISCHARGE MEDICATIONS:     New Prescriptions    ACETAMINOPHEN (APAP EXTRA STRENGTH) 500 MG TABLET    Take 1 tablet by mouth every 6 hours as needed for Pain    BENZONATATE (TESSALON PERLES) 100 MG CAPSULE    Take 1 capsule by mouth 3 times daily as needed for Cough    PREDNISONE (DELTASONE) 20 MG TABLET    Take 1 tablet by mouth 2 times daily for 5 days    TRIMETHOPRIM-POLYMYXIN B (POLYTRIM) 10467-3.1 UNIT/ML-% OPHTHALMIC SOLUTION    Place 1 drop into the left eye every 4 hours for 10 days       (Please note that portions of this note were completed with a voice recognition program.  Efforts were made to edit thedictations but occasionally words are mis-transcribed.)    Scribe:  Stephanie Heart 8/19/19 3:02 PM Scribing for and in the presence of Claire Serna PA-C. Signed by: Vel Caldera, 8/19/19 8:54 PM    Provider:  Jaswinder Ocampo performed the services described in the documentation, reviewed and edited the documentation which was dictated to the scribe in my presence, and it accurately records my words and actions.     Chris Berg PA-C 09/07/19 8:54 PM    [address] [place] [address] ANDREW  09/12/19 [id number]

## 2019-09-09 LAB — THROAT/NOSE CULTURE: NORMAL

## 2019-09-10 ENCOUNTER — HOSPITAL ENCOUNTER (EMERGENCY)
Age: 59
Discharge: HOME | End: 2019-09-10
Payer: MEDICARE

## 2019-09-10 VITALS
OXYGEN SATURATION: 97 % | RESPIRATION RATE: 18 BRPM | WEIGHT: 192 LBS | DIASTOLIC BLOOD PRESSURE: 60 MMHG | BODY MASS INDEX: 30.86 KG/M2 | HEIGHT: 66 IN | SYSTOLIC BLOOD PRESSURE: 130 MMHG | TEMPERATURE: 97.7 F | HEART RATE: 97 BPM

## 2019-09-10 DIAGNOSIS — E11.9 TYPE 2 DIABETES MELLITUS WITHOUT COMPLICATION, WITHOUT LONG-TERM CURRENT USE OF INSULIN (HCC): ICD-10-CM

## 2019-09-10 DIAGNOSIS — I10 ESSENTIAL HYPERTENSION: ICD-10-CM

## 2019-09-10 DIAGNOSIS — J20.9 ACUTE BRONCHITIS, UNSPECIFIED ORGANISM: ICD-10-CM

## 2019-09-10 DIAGNOSIS — Z72.0 TOBACCO ABUSE: ICD-10-CM

## 2019-09-10 DIAGNOSIS — J01.90 ACUTE NON-RECURRENT SINUSITIS, UNSPECIFIED LOCATION: Primary | ICD-10-CM

## 2019-09-10 LAB
EKG ATRIAL RATE: 100 BPM
EKG P AXIS: 79 DEGREES
EKG P-R INTERVAL: 154 MS
EKG Q-T INTERVAL: 356 MS
EKG QRS DURATION: 78 MS
EKG QTC CALCULATION (BAZETT): 459 MS
EKG R AXIS: 53 DEGREES
EKG T AXIS: 75 DEGREES
EKG VENTRICULAR RATE: 100 BPM

## 2019-09-10 PROCEDURE — 6360000002 HC RX W HCPCS: Performed by: NURSE PRACTITIONER

## 2019-09-10 PROCEDURE — 96372 THER/PROPH/DIAG INJ SC/IM: CPT

## 2019-09-10 PROCEDURE — 99214 OFFICE O/P EST MOD 30 MIN: CPT | Performed by: NURSE PRACTITIONER

## 2019-09-10 PROCEDURE — 93005 ELECTROCARDIOGRAM TRACING: CPT | Performed by: NURSE PRACTITIONER

## 2019-09-10 PROCEDURE — 2500000003 HC RX 250 WO HCPCS: Performed by: NURSE PRACTITIONER

## 2019-09-10 PROCEDURE — 6370000000 HC RX 637 (ALT 250 FOR IP): Performed by: NURSE PRACTITIONER

## 2019-09-10 PROCEDURE — 94640 AIRWAY INHALATION TREATMENT: CPT

## 2019-09-10 PROCEDURE — 99214 OFFICE O/P EST MOD 30 MIN: CPT

## 2019-09-10 PROCEDURE — 2709999900 HC NON-CHARGEABLE SUPPLY

## 2019-09-10 RX ORDER — IPRATROPIUM BROMIDE AND ALBUTEROL SULFATE 2.5; .5 MG/3ML; MG/3ML
1 SOLUTION RESPIRATORY (INHALATION) ONCE
Status: COMPLETED | OUTPATIENT
Start: 2019-09-10 | End: 2019-09-10

## 2019-09-10 RX ORDER — DEXTROMETHORPHAN POLISTIREX 30 MG/5ML
60 SUSPENSION ORAL 2 TIMES DAILY PRN
Qty: 1 BOTTLE | Refills: 0 | Status: SHIPPED | OUTPATIENT
Start: 2019-09-10 | End: 2019-09-20

## 2019-09-10 RX ORDER — FLUTICASONE PROPIONATE 50 MCG
2 SPRAY, SUSPENSION (ML) NASAL DAILY
Qty: 1 BOTTLE | Refills: 0 | Status: SHIPPED | OUTPATIENT
Start: 2019-09-10 | End: 2021-02-17 | Stop reason: ALTCHOICE

## 2019-09-10 RX ORDER — FLUTICASONE PROPIONATE 50 MCG
SPRAY, SUSPENSION (ML) NASAL
Refills: 0 | OUTPATIENT
Start: 2019-09-10

## 2019-09-10 RX ORDER — AMOXICILLIN AND CLAVULANATE POTASSIUM 500; 125 MG/1; MG/1
1 TABLET, FILM COATED ORAL 3 TIMES DAILY
Qty: 21 TABLET | Refills: 0 | Status: SHIPPED | OUTPATIENT
Start: 2019-09-10 | End: 2019-09-17

## 2019-09-10 RX ADMIN — IPRATROPIUM BROMIDE AND ALBUTEROL SULFATE 1 AMPULE: .5; 3 SOLUTION RESPIRATORY (INHALATION) at 18:53

## 2019-09-10 RX ADMIN — LIDOCAINE HYDROCHLORIDE 1 G: 10 INJECTION, SOLUTION EPIDURAL; INFILTRATION; INTRACAUDAL; PERINEURAL at 18:53

## 2019-09-10 ASSESSMENT — ENCOUNTER SYMPTOMS
EYE ITCHING: 0
ABDOMINAL DISTENTION: 0
NAUSEA: 0
RHINORRHEA: 1
COUGH: 1
BACK PAIN: 0
SINUS PAIN: 1
CHEST TIGHTNESS: 1
SHORTNESS OF BREATH: 0
FACIAL SWELLING: 0
SORE THROAT: 0
ABDOMINAL PAIN: 0
VOICE CHANGE: 0
EYE PAIN: 0
VOMITING: 0
PHOTOPHOBIA: 0
EYE DISCHARGE: 0
CHOKING: 0
SINUS PRESSURE: 0
EYE REDNESS: 0
STRIDOR: 0
WHEEZING: 1
DIARRHEA: 0
TROUBLE SWALLOWING: 0

## 2019-09-10 ASSESSMENT — PAIN DESCRIPTION - LOCATION: LOCATION: HEAD

## 2019-09-10 ASSESSMENT — PAIN DESCRIPTION - FREQUENCY: FREQUENCY: INTERMITTENT

## 2019-09-10 ASSESSMENT — PAIN DESCRIPTION - DESCRIPTORS: DESCRIPTORS: SHARP

## 2019-09-10 ASSESSMENT — PAIN DESCRIPTION - ORIENTATION: ORIENTATION: RIGHT

## 2019-09-10 ASSESSMENT — PAIN SCALES - GENERAL: PAINLEVEL_OUTOF10: 4

## 2019-09-10 ASSESSMENT — PAIN DESCRIPTION - PAIN TYPE: TYPE: ACUTE PAIN

## 2019-09-10 NOTE — ED PROVIDER NOTES
Samir 35  Urgent Care Encounter      [facility]       Chief Complaint   Patient presents with    Cough    Headache       Nurses Notes reviewed and I agree except as noted in the HPI. HISTORY OF PRESENT ILLNESS   Nitin Perla is a 60 y.o. female who presents:  Presents to the urgent care for evaluation of intermittent headache to the right parietal and posterior head that started 5 days ago. She states coughing makes the pain worse. The pain comes and goes. She states she has been taking extra strength Tylenol that does relieve it for 4 hours. She did hit her head 6 days ago on the left side of head on her refrigerator door. She was seen at [facility] ER 3 days ago for the same symptoms and states that she is not getting any better. She had a CAT scan of the head there was no acute findings; no intracranial hemorrhage mass, lesions or midline shift. Patient states that she does not have any history of migraines or headaches in the past.  She also reports that she has had a dry nonproductive harsh cough that is getting worse and making her bilateral ribs sore. She has had intermittent body aches, wheezing, rhinorrhea and nasal congestion. She has had URI symptoms for approximately 5 days. Her headache pain has been as severe as a 10 out of 10 it currently is 4 out of 10. She denies any weakness, unsteady gait, dizziness, syncope, eye pain, blurred vision, tremors, otalgia, sore throat, abdominal pain, back pain, neck pain,chest pain, dyspnea. She has had intermittent wheezing. She has used her rescue inhaler with relief of her symptoms she was placed on oral prednisone, Tylenol, Tessalon Perles and Polytrim ophthalmic solution. She states she was put on the ophthalmic drops for dry eyes. The [facility] were not effective. She has been using over-the-counter Mucinex as instructed by her PCP last month.   She was seen at her nephrologist last month and mentioned Tympanic membrane is bulging. Tympanic membrane is not injected, not perforated and not erythematous. A middle ear effusion is present. No hemotympanum. Nose: Mucosal edema (bilateral turbinates edema noted, left worse than right with erythematous turbinates) present. No rhinorrhea or sinus tenderness. Right sinus exhibits no maxillary sinus tenderness and no frontal sinus tenderness. Left sinus exhibits no maxillary sinus tenderness and no frontal sinus tenderness. Mouth/Throat: Uvula is midline, oropharynx is clear and moist and mucous membranes are normal. No trismus in the jaw. No uvula swelling. No oropharyngeal exudate, posterior oropharyngeal edema, posterior oropharyngeal erythema or tonsillar abscesses. Tonsils are 2+ on the right. Tonsils are 2+ on the left. No tonsillar exudate. Eyes: Pupils are equal, round, and reactive to light. Conjunctivae, EOM and lids are normal. Right eye exhibits no chemosis, no discharge, no exudate and no hordeolum. No foreign body present in the right eye. Left eye exhibits no chemosis, no discharge, no exudate and no hordeolum. No foreign body present in the left eye. Right conjunctiva is not injected. Right conjunctiva has no hemorrhage. Left conjunctiva is not injected. Left conjunctiva has no hemorrhage. No scleral icterus. Right eye exhibits normal extraocular motion and no nystagmus. Left eye exhibits normal extraocular motion and no nystagmus. Right pupil is round and reactive. Left pupil is round and reactive. Pupils are equal.   Neck: Normal range of motion. Cardiovascular: Normal rate, regular rhythm and normal heart sounds. No extrasystoles are present. Pulmonary/Chest: Effort normal and breath sounds normal. No accessory muscle usage or stridor. No tachypnea and no bradypnea. She has no decreased breath sounds. She has no wheezes. She has no rhonchi. She has no rales. She exhibits no tenderness and no crepitus.    Lymphadenopathy:     She has no cervical adenopathy. Neurological: She is alert and oriented to person, place, and time. No sensory deficit. Skin: Skin is warm, dry and intact. Capillary refill takes less than 2 seconds. No petechiae and no rash noted. She is not diaphoretic. No cyanosis. No pallor. Psychiatric: She has a normal mood and affect. Nursing note and vitals reviewed. DIAGNOSTIC RESULTS   Labs:  Results for orders placed or performed during the hospital encounter of 09/10/19   EKG 12 Lead   Result Value Ref Range    Ventricular Rate 100 BPM    Atrial Rate 100 BPM    P-R Interval 154 ms    QRS Duration 78 ms    Q-T Interval 356 ms    QTc Calculation (Bazett) 459 ms    P Axis 79 degrees    R Axis 53 degrees    T Axis 75 degrees     EKG: normal EKG, normal sinus rhythm, unchanged from previous tracings. IMAGING:    URGENT CARE COURSE:     Vitals:    09/10/19 1818 09/10/19 1911   BP: 121/63 130/60   Pulse: 105 97   Resp: 18 18   Temp: 97.7 °F (36.5 °C)    TempSrc: Tympanic    SpO2: 94% 97%   Weight: 192 lb (87.1 kg)    Height: 5' 6\" (1.676 m)        Medications   ipratropium-albuterol (DUONEB) nebulizer solution 1 ampule (1 ampule Inhalation Given 9/10/19 1853)   cefTRIAXone (ROCEPHIN) 1 g in lidocaine 1 % 2.86 mL IM Injection (1 g Intramuscular Given 9/10/19 1853)   effective. o2 sat improved after treatment. Feeling better. PROCEDURES:  None  FINAL IMPRESSION       1. Acute non-recurrent sinusitis, unspecified location    2. Acute bronchitis, unspecified organism    3. Tobacco abuse    4. Type 2 diabetes mellitus without complication, without long-term current use of insulin ([address]    5. Essential hypertension        DISPOSITION/PLAN   DISPOSITION    During eval at ER [facility] 3 days ago low grade temp noted 99.4, pulse ox was 95 % she had a normal CT head, negative chest xray. She has had URI symptoms for 5 days, no sputum, no dyspnea, no chest pain, no current fever. She has headache worse with cough, over right eye.   Could

## 2019-09-10 NOTE — ED NOTES
Patient presents to [facility] with complaints of a headache / cough since last Thursday. Patient states she was in [facility] ED on Saturday. Patient reports not getting any better.       Bryan Delgadillo LPN  55/88/54 [id number]

## 2019-09-11 PROCEDURE — 93010 ELECTROCARDIOGRAM REPORT: CPT | Performed by: NUCLEAR MEDICINE

## 2019-09-12 ASSESSMENT — ENCOUNTER SYMPTOMS
SINUS PAIN: 0
SINUS PRESSURE: 0
PHOTOPHOBIA: 0
WHEEZING: 1
RHINORRHEA: 1

## 2020-08-13 ENCOUNTER — OFFICE VISIT (OUTPATIENT)
Dept: INTERNAL MEDICINE CLINIC | Age: 60
End: 2020-08-13
Payer: MEDICARE

## 2020-08-13 VITALS
BODY MASS INDEX: 30.22 KG/M2 | HEIGHT: 66 IN | WEIGHT: 188 LBS | HEART RATE: 90 BPM | TEMPERATURE: 97.5 F | DIASTOLIC BLOOD PRESSURE: 74 MMHG | SYSTOLIC BLOOD PRESSURE: 183 MMHG

## 2020-08-13 LAB — HBA1C MFR BLD: 6.7 % (ref 4.3–5.7)

## 2020-08-13 PROCEDURE — G8427 DOCREV CUR MEDS BY ELIG CLIN: HCPCS | Performed by: INTERNAL MEDICINE

## 2020-08-13 PROCEDURE — G8417 CALC BMI ABV UP PARAM F/U: HCPCS | Performed by: INTERNAL MEDICINE

## 2020-08-13 PROCEDURE — 83036 HEMOGLOBIN GLYCOSYLATED A1C: CPT | Performed by: INTERNAL MEDICINE

## 2020-08-13 PROCEDURE — 3044F HG A1C LEVEL LT 7.0%: CPT | Performed by: INTERNAL MEDICINE

## 2020-08-13 PROCEDURE — 99214 OFFICE O/P EST MOD 30 MIN: CPT | Performed by: INTERNAL MEDICINE

## 2020-08-13 PROCEDURE — 4004F PT TOBACCO SCREEN RCVD TLK: CPT | Performed by: INTERNAL MEDICINE

## 2020-08-13 PROCEDURE — 2022F DILAT RTA XM EVC RTNOPTHY: CPT | Performed by: INTERNAL MEDICINE

## 2020-08-13 PROCEDURE — 3017F COLORECTAL CA SCREEN DOC REV: CPT | Performed by: INTERNAL MEDICINE

## 2020-08-13 SDOH — HEALTH STABILITY: MENTAL HEALTH: HOW OFTEN DO YOU HAVE A DRINK CONTAINING ALCOHOL?: 2-3 TIMES A WEEK

## 2020-08-13 SDOH — HEALTH STABILITY: MENTAL HEALTH: HOW MANY STANDARD DRINKS CONTAINING ALCOHOL DO YOU HAVE ON A TYPICAL DAY?: 1 OR 2

## 2020-08-13 ASSESSMENT — PATIENT HEALTH QUESTIONNAIRE - PHQ9
1. LITTLE INTEREST OR PLEASURE IN DOING THINGS: 0
SUM OF ALL RESPONSES TO PHQ QUESTIONS 1-9: 1
2. FEELING DOWN, DEPRESSED OR HOPELESS: 1
SUM OF ALL RESPONSES TO PHQ QUESTIONS 1-9: 1
SUM OF ALL RESPONSES TO PHQ9 QUESTIONS 1 & 2: 1

## 2020-08-14 ENCOUNTER — OFFICE VISIT (OUTPATIENT)
Dept: CARDIOLOGY CLINIC | Age: 60
End: 2020-08-14
Payer: MEDICARE

## 2020-08-14 VITALS
DIASTOLIC BLOOD PRESSURE: 79 MMHG | HEART RATE: 101 BPM | SYSTOLIC BLOOD PRESSURE: 135 MMHG | WEIGHT: 186 LBS | BODY MASS INDEX: 29.89 KG/M2 | HEIGHT: 66 IN

## 2020-08-14 PROCEDURE — G8427 DOCREV CUR MEDS BY ELIG CLIN: HCPCS | Performed by: INTERNAL MEDICINE

## 2020-08-14 PROCEDURE — 3017F COLORECTAL CA SCREEN DOC REV: CPT | Performed by: INTERNAL MEDICINE

## 2020-08-14 PROCEDURE — G8417 CALC BMI ABV UP PARAM F/U: HCPCS | Performed by: INTERNAL MEDICINE

## 2020-08-14 PROCEDURE — 99214 OFFICE O/P EST MOD 30 MIN: CPT | Performed by: INTERNAL MEDICINE

## 2020-08-14 PROCEDURE — 4004F PT TOBACCO SCREEN RCVD TLK: CPT | Performed by: INTERNAL MEDICINE

## 2020-08-14 RX ORDER — METOPROLOL SUCCINATE 50 MG/1
50 TABLET, EXTENDED RELEASE ORAL DAILY
Qty: 90 TABLET | Refills: 3 | Status: SHIPPED | OUTPATIENT
Start: 2020-08-14

## 2020-08-14 NOTE — PROGRESS NOTES
Chief Complaint   Patient presents with    1 Year Follow Up       1 year F/u      Sob on marked exertion    Denied  Chest pain, dizzinesss, palpitation or edema    Doing well    Record reviewed      Patient Active Problem List   Diagnosis    Nonobstructive CAD (coronary artery disease)    Asthma    Diabetes mellitus ([facility] [address]    High cholesterol    High blood pressure    Wheezing    Fatigue    Joint pain    Arthritis    FH: CAD (coronary artery disease)    Tobacco abuse    Pseudoaneurysm (HCC)    Costochondritis    Bell's palsy    Neck pain    Thyroid nodule    Nicotine abuse    SOB (shortness of breath)    Multinodular goiter    COPD with acute exacerbation (HCC)    Palpitation    Pure hypercholesterolemia    Essential hypertension    Anxiety    Low back pain    Degeneration of lumbar intervertebral disc    Lumbosacral radiculopathy       Past Surgical History:   Procedure Laterality Date    BACK SURGERY  12/05/2016    CARDIOVASCULAR STRESS TEST  10 02 2009    Gated SPECT LV function demonstrated normal thickening, normal contractility, normal wall motion, EF of 46% which is low normal; however, visualized by myself appears to be 55%. No evidence of prior transmural MI or ischemia. Excellend treadmill exercise. Completed 10 METS, functional class I. No evidence of ischemic -induced EKG changes are noted. Appropriate hemodynamic response to exercise.  COLONOSCOPY      COLONOSCOPY N/A 2/13/2019    COLONOSCOPY POLYPECTOMY SNARE/COLD BIOPSY performed by Colette Ulloa MD at [place] [id number] CATH LAB PROCEDURE  10 12 2009    LV demonstrates normal systolic function, EF 03%. No critical lesions are noted in all larger pericardial vessels & show that left main is widely patent. Circumflex is widely patent with anterior marginal one & two being widely patent. Left anterior descending artery is widely patent, diagonal one large caliber vessel, widely patent.  Right coronary artery is widely patent & is dominant.  DOPPLER ECHOCARDIOGRAPHY  10 02 2009    LV demonstrated normal thickening, normal contractility, normal motion with mild systolic dilatation of the ventricle noted. EF 60%. Left atrium is mildly dilated. There is stage I diastolic dysfunction.  PARTIAL HYSTERECTOMY  1999    ROTATOR CUFF REPAIR Bilateral 2007       Allergies   Allergen Reactions    Morphine Itching        Family History   Problem Relation Age of Onset    Heart Disease Mother     Hypertension Mother     High Cholesterol Mother     Pacemaker Mother     Heart Defect Brother     Heart Defect Brother     Heart Disease Sister 43        Sister had CABG.  Heart Disease Sister 55        Another sister had CABG. Social History     Socioeconomic History    Marital status: Single     Spouse name: Not on file    Number of children: Not on file    Years of education: Not on file    Highest education level: Not on file   Occupational History    Not on file   Social Needs    Financial resource strain: Not on file    Food insecurity     Worry: Not on file     Inability: Not on file    Transportation needs     Medical: Not on file     Non-medical: Not on file   Tobacco Use    Smoking status: Current Every Day Smoker     Packs/day: 0.50     Years: 35.00     Pack years: 17.50     Types: Cigarettes    Smokeless tobacco: Never Used   Substance and Sexual Activity    Alcohol use: Yes     Alcohol/week: 0.0 standard drinks     Frequency: 2-3 times a week     Drinks per session: 1 or 2     Comment: Patient states, \"occasionally. \"    Drug use: No    Sexual activity: Yes     Partners: Male   Lifestyle    Physical activity     Days per week: Not on file     Minutes per session: Not on file    Stress: Not on file   Relationships    Social connections     Talks on phone: Not on file     Gets together: Not on file     Attends Quaker service: Not on file     Active member of club or organization: Not on file     Attends meetings of clubs or organizations: Not on file     Relationship status: Not on file    Intimate partner violence     Fear of current or ex partner: Not on file     Emotionally abused: Not on file     Physically abused: Not on file     Forced sexual activity: Not on file   Other Topics Concern    Not on file   Social History Narrative    Not on file       Current Outpatient Medications   Medication Sig Dispense Refill    Probiotic Product (PROBIOTIC DAILY PO) Take 1 capsule by mouth daily      fluticasone (FLONASE) 50 MCG/ACT nasal spray 2 sprays by Nasal route daily Apply daily to each nare 1 Bottle 0    acetaminophen (APAP EXTRA STRENGTH) 500 MG tablet Take 1 tablet by mouth every 6 hours as needed for Pain 120 tablet 0    Lifitegrast (XIIDRA OP) Apply to eye      Omega-3 Fatty Acids (FISH OIL PO) Take by mouth      albuterol sulfate HFA (PROAIR HFA) 108 (90 Base) MCG/ACT inhaler Inhale 2 puffs into the lungs every 6 hours as needed for Wheezing      potassium chloride (KLOR-CON M) 20 MEQ extended release tablet Take 0.5 tablets by mouth daily 60 tablet 3    buPROPion (WELLBUTRIN XL) 300 MG extended release tablet Take 300 mg by mouth every morning      carboxymethylcellulose 1 % ophthalmic solution Place 1-2 drops into both eyes as needed for Dry Eyes      glucose blood VI test strips (FREESTYLE LITE) strip Testing once daily 90 strip 1    metoprolol (TOPROL-XL) 25 MG XL tablet Take 25 mg by mouth daily       tramadol (ULTRAM) 50 MG tablet Take 100 mg by mouth every 8 hours as needed for Pain. Gume Loss amlodipine (NORVASC) 5 MG tablet Take 5 mg by mouth daily.  sitagliptan-metformin (JANUMET)  MG per tablet Take 1 tablet by mouth 2 times daily.  pravastatin (PRAVACHOL) 40 MG tablet Take 40 mg by mouth daily       aspirin 81 MG EC tablet Take 81 mg by mouth daily. No current facility-administered medications for this visit. Review of Systems -     General ROS: negative  Psychological ROS: negative  Hematological and Lymphatic ROS: No history of blood clots or bleeding disorder. Respiratory ROS: no cough,  or wheezing, the rest see HPI  Cardiovascular ROS: See HPI  Gastrointestinal ROS: negative  Genito-Urinary ROS: no dysuria, trouble voiding, or hematuria  Musculoskeletal ROS: negative  Neurological ROS: no TIA or stroke symptoms  Dermatological ROS: negative      Blood pressure 135/79, pulse 101, height 5' 6\" (1.676 m), weight 186 lb (84.4 kg), not currently breastfeeding. Physical Examination:    General appearance - alert, well appearing, and in no distress  HEENT- Pink conjunctiva  , Non-icteri sclera,PERRLA  Mental status - alert, oriented to person, place, and time  Neck - supple, no significant adenopathy, no JVD, or carotid bruits  Chest - clear to auscultation, no wheezes, rales or rhonchi, symmetric air entry  Heart - normal rate, regular rhythm, normal S1, S2, no murmurs, rubs, clicks or gallops  Abdomen - soft, nontender, nondistended, no masses or organomegaly  CARLENE- no CVA or flank tenderness, no suprapubic tenderness  Neurological - alert, oriented, normal speech, no focal findings or movement disorder noted  Musculoskeletal/limbs - no joint tenderness, deformity or swelling   - peripheral pulses normal, no pedal edema, no clubbing or cyanosis  Skin - normal coloration and turgor, no rashes, no suspicious skin lesions noted  Psych- appropriate mood and affect    Lab  No results for input(s): CKTOTAL, CKMB, CKMBINDEX, TROPONINI in the last 72 hours.   CBC:   Lab Results   Component Value Date    WBC 7.1 09/07/2019    RBC 4.65 09/07/2019    RBC 4.46 11/11/2011    HGB 14.7 09/07/2019    HCT 42.9 09/07/2019    MCV 92.3 09/07/2019    MCH 31.6 09/07/2019    MCHC 34.3 09/07/2019    RDW 13.2 05/25/2018     09/07/2019    MPV 10.5 09/07/2019     BMP:    Lab Results   Component Value Date     09/07/2019 K 3.5 09/07/2019    K 4.4 01/09/2019    CL 97 09/07/2019    CO2 23 09/07/2019    BUN 7 09/07/2019    LABALBU 4.5 09/07/2019    LABALBU 4.4 10/27/2011    CREATININE 0.7 09/07/2019    CALCIUM 9.2 09/07/2019    LABGLOM >90 09/07/2019    GLUCOSE 138 09/07/2019    GLUCOSE 100 10/30/2011     Hepatic Function Panel:    Lab Results   Component Value Date    ALKPHOS 107 09/07/2019    ALT 32 09/07/2019    AST 23 09/07/2019    PROT 7.3 09/07/2019    BILITOT 0.3 09/07/2019    BILIDIR <0.2 09/07/2019    LABALBU 4.5 09/07/2019    LABALBU 4.4 10/27/2011     Magnesium:    Lab Results   Component Value Date    MG 1.8 01/09/2019     Warfarin PT/INR:  No components found for: PTPATWAR, PTINRWAR  HgBA1c:    Lab Results   Component Value Date    LABA1C 6.7 08/13/2020    LABA1C 6.5 01/08/2019     FLP:    Lab Results   Component Value Date    TRIG 117 05/25/2018    HDL 44 05/25/2018    LDLCALC 69 05/25/2018     TSH:    Lab Results   Component Value Date    TSH 0.620 08/23/2018      She had a cardiac catheterization in 2009 and normal LV function and no significant  obstructive lesion. Widely patent and dominant RCA; diagonal large  caliber, widely patent; LAD, widely patent; large vessel and left  circumflex, widely patent. So basically, all the arteries are widely  patent in the cardiac catheterization then. Assessment   Diagnosis Orders   1. Essential hypertension     2. Pure hypercholesterolemia     3. Tobacco abuse             Recent admission DX  ASSESSMENT:  1. Atypical chest pain, appears to be more musculoskeletal with chest  wall tenderness. 2.  COPD. 3.  Hypertension. 4.  Hyperlipidemia. 5.  History of diabetes.       Plan     The current meds and labs reviewed    Continue the current treatment and with constant vigilance to changes in symptoms and also any potential side effects. Return for care or seek medical attention immediately if symptoms got worse and/or develop new symptoms.     Hypertension, on medical treatment. Seems to be under good control. Patient is compliant with medical treatment. Sinus tachycardia  Increase toprol xl 50 from 25 qd     Hyperlipidemia: on statins, followed periodically. Patient need periodic lipid and liver profile. Smoking: discussed with the patient the importance of smoke cessation especially with the risk of CAD.     Lipid panel and liver function test before next appointment    Doing well and stable    D/w the pat the plan of care    RTC in  1 year      Albert Lujan

## 2020-08-20 ENCOUNTER — HOSPITAL ENCOUNTER (OUTPATIENT)
Dept: ULTRASOUND IMAGING | Age: 60
Discharge: HOME | End: 2020-08-20
Payer: MEDICARE

## 2020-08-20 PROCEDURE — 76536 US EXAM OF HEAD AND NECK: CPT

## 2020-11-03 PROBLEM — I10 HIGH BLOOD PRESSURE: Status: RESOLVED | Noted: 2020-11-03 | Resolved: 2020-11-03

## 2020-11-08 ENCOUNTER — HOSPITAL ENCOUNTER (EMERGENCY)
Age: 60
Discharge: HOME | End: 2020-11-08
Payer: MEDICARE

## 2020-11-08 VITALS
HEART RATE: 95 BPM | WEIGHT: 182 LBS | SYSTOLIC BLOOD PRESSURE: 158 MMHG | BODY MASS INDEX: 29.25 KG/M2 | OXYGEN SATURATION: 98 % | DIASTOLIC BLOOD PRESSURE: 72 MMHG | TEMPERATURE: 98 F | RESPIRATION RATE: 16 BRPM | HEIGHT: 66 IN

## 2020-11-08 PROCEDURE — 99213 OFFICE O/P EST LOW 20 MIN: CPT

## 2020-11-08 PROCEDURE — U0003 INFECTIOUS AGENT DETECTION BY NUCLEIC ACID (DNA OR RNA); SEVERE ACUTE RESPIRATORY SYNDROME CORONAVIRUS 2 (SARS-COV-2) (CORONAVIRUS DISEASE [COVID-19]), AMPLIFIED PROBE TECHNIQUE, MAKING USE OF HIGH THROUGHPUT TECHNOLOGIES AS DESCRIBED BY CMS-2020-01-R: HCPCS

## 2020-11-08 PROCEDURE — 99214 OFFICE O/P EST MOD 30 MIN: CPT | Performed by: NURSE PRACTITIONER

## 2020-11-08 ASSESSMENT — ENCOUNTER SYMPTOMS
EYE REDNESS: 0
EYE ITCHING: 0
COUGH: 0
WHEEZING: 0
SORE THROAT: 0
EYE PAIN: 0
TROUBLE SWALLOWING: 0
RHINORRHEA: 1
SWOLLEN GLANDS: 0
SINUS PAIN: 0
CHEST TIGHTNESS: 0

## 2020-11-08 ASSESSMENT — PAIN DESCRIPTION - DESCRIPTORS: DESCRIPTORS: HEADACHE

## 2020-11-08 ASSESSMENT — PAIN DESCRIPTION - PROGRESSION: CLINICAL_PROGRESSION: NOT CHANGED

## 2020-11-08 ASSESSMENT — PAIN - FUNCTIONAL ASSESSMENT: PAIN_FUNCTIONAL_ASSESSMENT: ACTIVITIES ARE NOT PREVENTED

## 2020-11-08 ASSESSMENT — PAIN DESCRIPTION - LOCATION: LOCATION: HEAD

## 2020-11-08 ASSESSMENT — PAIN DESCRIPTION - PAIN TYPE: TYPE: ACUTE PAIN

## 2020-11-08 ASSESSMENT — PAIN DESCRIPTION - ONSET: ONSET: AWAKENED FROM SLEEP

## 2020-11-08 ASSESSMENT — PAIN DESCRIPTION - FREQUENCY: FREQUENCY: CONTINUOUS

## 2020-11-08 ASSESSMENT — PAIN SCALES - GENERAL: PAINLEVEL_OUTOF10: 6

## 2020-11-08 NOTE — ED PROVIDER NOTES
[id number]       Chief Complaint   Patient presents with    Nasal Congestion    Headache       Nurses Notes reviewed and I agree except as noted in the HPI. HISTORY OF PRESENT ILLNESS   Yolanda Maier is a 63 y.o. female who presents The history is provided by the patient. URI   Presenting symptoms: congestion, fatigue and rhinorrhea    Presenting symptoms: no cough, no fever and no sore throat    Congestion:     Location:  Nasal    Interferes with sleep: yes      Interferes with eating/drinking: yes    Fatigue:     Severity:  Mild    Duration:  1 day    Timing:  Intermittent    Progression:  Worsening  Severity:  Mild  Onset quality:  Sudden  Duration:  1 day  Timing:  Intermittent  Progression:  Worsening  Chronicity:  New  Relieved by:  Nothing  Worsened by:  Certain positions, drinking and eating  Ineffective treatments:  None tried  Associated symptoms: headaches    Associated symptoms: no arthralgias, no myalgias, no sinus pain, no sneezing, no swollen glands and no wheezing    Risk factors: sick contacts    Risk factors: no chronic kidney disease and no chronic respiratory disease          REVIEW OF SYSTEMS     Review of Systems   Constitutional: Positive for fatigue. Negative for activity change, appetite change and fever. HENT: Positive for congestion and rhinorrhea. Negative for sinus pain, sneezing, sore throat and trouble swallowing. Eyes: Negative for pain, redness and itching. Respiratory: Negative for cough, chest tightness and wheezing. Cardiovascular: Negative for chest pain and leg swelling. Endocrine: Negative for cold intolerance and heat intolerance. Genitourinary: Negative. Musculoskeletal: Negative for arthralgias and myalgias. Skin: Negative. Allergic/Immunologic: Positive for environmental allergies. Neurological: Positive for headaches. Hematological: Negative for adenopathy.  Does not bruise/bleed easily. Psychiatric/Behavioral: Negative. PAST MEDICAL HISTORY         Diagnosis Date    Arthritis     Asthma     Atypical chest pain     Atypical chest discomfort in hospital multiple times with stress test being normal.     Bell's palsy     COPD (chronic obstructive pulmonary disease) (HCC)     Costochondritis     Diabetes mellitus (HCC)     Fatigue     FH: CAD (coronary artery disease)     Strong family history of CAD.  High blood pressure     High cholesterol     Joint pain     Nonobstructive CAD (coronary artery disease)     Pseudoaneurysm (HCC)     Patient developed small pseudoaneurysm in right groin requiring thrombin injection by Dr. Nick Astorga which was successful.  Tobacco abuse     Tobacco abuse on Habitrol patch.  Wheezing        SURGICAL HISTORY     Patient  has a past surgical history that includes partial hysterectomy (cervix not removed) (1999); Rotator cuff repair (Bilateral, 2007); cardiovascular stress test (10 02 2009); doppler echocardiography (10 02 2009); Diagnostic Cardiac Cath Lab Procedure (10 12 2009); back surgery (12/05/2016); Colonoscopy; and Colonoscopy (N/A, 2/13/2019).     CURRENT MEDICATIONS       Discharge Medication List as of 11/8/2020 11:11 AM      CONTINUE these medications which have NOT CHANGED    Details   Probiotic Product (PROBIOTIC DAILY PO) Take 1 capsule by mouth dailyHistorical Med      metoprolol succinate (TOPROL XL) 50 MG extended release tablet Take 1 tablet by mouth daily, Disp-90 tablet,R-3Normal      acetaminophen (APAP EXTRA STRENGTH) 500 MG tablet Take 1 tablet by mouth every 6 hours as needed for Pain, Disp-120 tablet, R-0Print      Lifitegrast (XIIDRA OP) Apply to eyeHistorical Med      Omega-3 Fatty Acids (FISH OIL PO) Take by mouthHistorical Med      albuterol sulfate HFA (PROAIR HFA) 108 (90 Base) MCG/ACT inhaler Inhale 2 puffs into the lungs every 6 hours as needed for WheezingHistorical Med      potassium chloride (KLOR-CON M) 20 MEQ extended release tablet Take 0.5 tablets by mouth daily, Disp-60 tablet, R-3Adjust Sig      buPROPion (WELLBUTRIN XL) 300 MG extended release tablet Take 300 mg by mouth every morningHistorical Med      carboxymethylcellulose 1 % ophthalmic solution Place 1-2 drops into both eyes as needed for Dry EyesHistorical Med      glucose blood VI test strips (FREESTYLE LITE) strip Disp-90 strip, R-1, NormalTesting once daily      tramadol (ULTRAM) 50 MG tablet Take 100 mg by mouth every 8 hours as needed for Pain. Della Neffs Historical Med      amlodipine (NORVASC) 5 MG tablet Take 5 mg by mouth daily. sitagliptan-metformin (JANUMET)  MG per tablet Take 1 tablet by mouth 2 times daily. pravastatin (PRAVACHOL) 40 MG tablet Take 40 mg by mouth daily Historical Med      fluticasone (FLONASE) 50 MCG/ACT nasal spray 2 sprays by Nasal route daily Apply daily to each nare, Disp-1 Bottle, R-0Normal      aspirin 81 MG EC tablet Take 81 mg by mouth daily. ALLERGIES     Patient is is allergic to morphine. FAMILY HISTORY     Patient'sfamily history includes Heart Defect in her brother and brother; Heart Disease in her mother; Heart Disease (age of onset: 43) in her sister; Heart Disease (age of onset: 55) in her sister; High Cholesterol in her mother; Hypertension in her mother; Pacemaker in her mother. SOCIAL HISTORY     Patient  reports that she has been smoking cigarettes. She has a 17.50 pack-year smoking history. She has never used smokeless tobacco. She reports current alcohol use. She reports that she does not use drugs. PHYSICAL EXAM     ED TRIAGE VITALS  BP: (!) 158/72, Temp: 98 °F (36.7 °C), Pulse: 95, Resp: 16, SpO2: 98 %  Physical Exam  Vitals signs and nursing note reviewed. Constitutional:       Appearance: Normal appearance. She is well-developed and normal weight. HENT:      Head: Normocephalic and atraumatic.       Right Ear: Tympanic membrane, ear canal and external ear normal.      Left Ear: Tympanic membrane, ear canal and external ear normal.      Nose: Nose normal.      Mouth/Throat:      Mouth: Mucous membranes are dry. Pharynx: No oropharyngeal exudate or posterior oropharyngeal erythema. Eyes:      General:         Right eye: No discharge. Left eye: No discharge. Conjunctiva/sclera: Conjunctivae normal.   Neck:      Musculoskeletal: Normal range of motion and neck supple. No muscular tenderness. Thyroid: No thyromegaly. Cardiovascular:      Rate and Rhythm: Normal rate and regular rhythm. Pulses: Normal pulses. Heart sounds: Normal heart sounds. Pulmonary:      Effort: Pulmonary effort is normal. No respiratory distress. Breath sounds: Normal breath sounds. No wheezing or rales. Chest:      Chest wall: No tenderness. Abdominal:      General: Bowel sounds are normal. There is no distension. Palpations: Abdomen is soft. Tenderness: There is no abdominal tenderness. Musculoskeletal: Normal range of motion. General: No tenderness. Lymphadenopathy:      Cervical: No cervical adenopathy. Skin:     General: Skin is warm and dry. Capillary Refill: Capillary refill takes less than 2 seconds. Coloration: Skin is not pale. Findings: No erythema or rash. Neurological:      General: No focal deficit present. Mental Status: She is alert and oriented to person, place, and time. Cranial Nerves: No cranial nerve deficit. Motor: No abnormal muscle tone. Coordination: Coordination normal.      Deep Tendon Reflexes: Reflexes are normal and symmetric. Reflexes normal.   Psychiatric:         Behavior: Behavior normal.         Thought Content: Thought content normal.         Judgment: Judgment normal.         DIAGNOSTIC RESULTS   Labs: No results found for this visit on 11/08/20.     IMAGING:  No orders to display     URGENT CARE COURSE:     Vitals:    11/08/20 1054   BP: (!) 158/72   Pulse: 95   Resp: 16   Temp: 98 °F (36.7 °C)   TempSrc: Temporal   SpO2: 98%   Weight: 182 lb (82.6 kg)   Height: 5' 6\" (1.676 m)       Medications - No data to display  PROCEDURES:  None  FINALIMPRESSION    I have reviewed the patient's medical history in detail and updated the computerized patient record. HPI/ROs per the patient. Low risk for covid exposure. Symptoms x 24 hours. The patient was advised to drink plenty of fluids. They may take Tylenol for fever or body aches. Take prescribed medication as directed. They may also take OTC antihistamines/ decongestant as needed. Pt is advised to go to ER if symptoms worsen, new symptoms develop, high fever >102, vomiting, breathing difficulty, lethargy, chest pain or shortness of breath Dial 911. The patient or patient's representative is agreeable to the treatment plan they're advised to follow-up with her primary care provider in one week for reevaluation. Steps to help prevent the spread of COVID-19 if you are sick  SOURCE - https://santos-pantoja.info/. html     Stay home except to get medical care   ; Stay home: People who are mildly ill with COVID-19 are able to isolate at home during their illness. You should restrict activities outside your home, except for getting medical care.   ; Avoid public areas: Do not go to work, school, or public areas.   ; Avoid public transportation: Avoid using public transportation, ride-sharing, or taxis.  ; Separate yourself from other people and animals in your home   ; Stay away from others: As much as possible, you should stay in a specific room and away from other people in your home. Also, you should use a separate bathroom, if available.   ; Limit contact with pets & animals: You should restrict contact with pets and other animals while you are sick with COVID-19, just like you would around other people.  Although there have not been reports of pets or other animals becoming sick with COVID-19, it is still recommended that people sick with COVID-19 limit contact with animals until more information is known about the virus. ; When possible, have another member of your household care for your animals while you are sick. If you are sick with COVID-19, avoid contact with your pet, including petting, snuggling, being kissed or licked, and sharing food. If you must care for your pet or be around animals while you are sick, wash your hands before and after you interact with pets and wear a facemask. See COVID-19 and Animals for more information. Other considerations   The ill person should eat/be fed in their room if possible. Non-disposable  items used should be handled with gloves and washed with hot water or in a . Clean hands after handling used  items.  If possible, dedicate a lined trash can for the ill person. Use gloves when removing garbage bags, handling, and disposing of trash. Wash hands after handling or disposing of trash.  Consider consulting with your local health department about trash disposal guidance if available. Information for Household Members and Caregivers of Someone who is Sick   Call ahead before visiting your doctor   Call ahead: If you have a medical appointment, call the healthcare provider and tell them that you have or may have COVID-19. This will help the healthcare provider's office take steps to keep other people from getting infected or exposed. Wear a facemask if you are sick   ; If you are sick: You should wear a facemask when you are around other people (e.g., sharing a room or vehicle) or pets and before you enter a healthcare provider's office.    ; If you are caring for others: If the person who is sick is not able to wear a facemask (for example, because it causes trouble breathing), then people who live with the person who is sick should not stay in the same room with them, or they should touch surfaces include counters, tabletops, doorknobs, bathroom fixtures, toilets, phones, keyboards, tablets, and bedside tables.  ; Disinfect areas with bodily fluids: Also, clean any surfaces that may have blood, stool, or body fluids on them.   ; Household : Use a household cleaning spray or wipe, according to the label instructions. Labels contain instructions for safe and effective use of the cleaning product including precautions you should take when applying the product, such as wearing gloves and making sure you have good ventilation during use of the product. Monitor your symptoms   Seek medical attention: Seek prompt medical attention if your illness is worsening     (e.g., difficulty breathing).   ; Call your doctor: Before seeking care, call your healthcare provider and tell them that you have, or are being evaluated for, COVID-19.   ; Wear a facemask when sick: Put on a facemask before you enter the facility. These steps will help the healthcare provider's office to keep other people in the office or waiting room from getting infected or exposed. ; Alert health department: Ask your healthcare provider to call the local or [facility] health department. Persons who are placed under active monitoring or facilitated self-monitoring should follow instructions provided by their local health department or occupational health professionals, as appropriate.  ; Call 911 if you have a medical emergency: If you have a medical emergency and need to call 911, notify the dispatch personnel that you have, or are being evaluated for COVID-19. If possible, put on a facemask before emergency medical services arrive. 1. Seasonal allergic rhinitis due to pollen    2.  Encounter for laboratory testing for COVID-19 virus        DISPOSITION/PLAN   DISPOSITION Decision To Discharge 11/08/2020 11:10:02 AM    PATIENT REFERRED TO:  Stella Cannon MD  [address] [facility]  [address] [facility]  [address]  [phone]    In 3 days  As needed, If symptoms worsen    DISCHARGE MEDICATIONS:  Discharge Medication List as of 11/8/2020 11:11 AM        Discharge Medication List as of 11/8/2020 11:11 AM          Erna Romano, [address] [place] [address], APRN - CNP  11/08/20 1147

## 2020-11-09 ENCOUNTER — CARE COORDINATION (OUTPATIENT)
Dept: CARE COORDINATION | Age: 60
End: 2020-11-09

## 2020-11-09 NOTE — CARE COORDINATION
Patient contacted regarding Shahla Guerin. Discussed COVID-19 related testing which was pending at this time. Test results were pending. Patient informed of results, if available? N/A  pending    Care Transition Nurse/ Ambulatory Care Manager contacted the patient by telephone to perform post discharge assessment. Call within 2 business days of discharge: Yes. Verified name and  with patient as identifiers. Provided introduction to self, and explanation of the CTN/ACM role, and reason for call due to risk factors for infection and/or exposure to COVID-19. Symptoms reviewed with patient who verbalized the following symptoms: no new symptoms and no worsening symptoms. Due to no new or worsening symptoms encounter was not routed to provider for escalation. Discussed follow-up appointments. If no appointment was previously scheduled, appointment scheduling offered: Yes  [facility] follow up appointment(s):   Future Appointments   Date Time Provider Dasha Casillas   2021 11:15 AM MD SUZI [facility] [address] [place] [address]   2021 12:30 PM MD SUZI [facility] SUSHMA KILPATRICK II.VIERTEL     [facility] follow up appointment(s):       Non-face-to-face services provided:  Obtained and reviewed discharge summary and/or continuity of care documents  Education of patient/family/caregiver/guardian to support self-management-yes  Assessment and support for treatment adherence and medication management-yes     Advance Care Planning:   Does patient have an Advance Directive:  reviewed and current. Patient has following risk factors of: COPD. CTN/ACM reviewed discharge instructions, medical action plan and red flags such as increased shortness of breath, increasing fever and signs of decompensation with patient who verbalized understanding. Discussed exposure protocols and quarantine with CDC Guidelines What to do if you are sick with coronavirus disease 2019.  Patient was given an opportunity for questions and concerns. The patient agrees to contact the [facility] exposure line [phone], local health department [place] [facility]: ([phone]) and PCP office for questions related to their healthcare. CTN/ACM provided contact information for future needs. Reviewed and educated patient on any new and changed medications related to discharge diagnosis     Patient/family/caregiver given information for [employer] Loop and agrees to enroll no  Patient's preferred e-mail:    Patient's preferred phone number:   Based on Loop alert triggers, patient will be contacted by nurse care manager for worsening symptoms. calls based on severity of symptoms and risk factors. Advance Care Planning  People with COVID-19 may have no symptoms, mild symptoms, such as fever, cough, and shortness of breath or they may have more severe illness, developing severe and fatal pneumonia. As a result, Advance Care Planning with attention to naming a health care decision maker (someone you trust to make healthcare decisions for you if you could not speak for yourself) and sharing other health care preferences is important BEFORE a possible health crisis. Please contact your Primary Care Provider to discuss Advance Care Planning. Preventing the Spread of Coronavirus Disease 2019 in Homes and Residential Communities  For the most recent information go to [email or web address].[email or web address]    Prevention steps for People with confirmed or suspected COVID-19 (including persons under investigation) who do not need to be hospitalized  and   People with confirmed COVID-19 who were hospitalized and determined to be medically stable to go home    Your healthcare provider and public health staff will evaluate whether you can be cared for at home.  If it is determined that you do not need to be hospitalized and can be isolated at home, you will be monitored by staff from your local or [facility] department. You should follow the prevention steps below until a healthcare provider or local or state health department says you can return to your normal activities. Stay home except to get medical care  People who are mildly ill with COVID-19 are able to isolate at home during their illness. You should restrict activities outside your home, except for getting medical care. Do not go to work, school, or public areas. Avoid using public transportation, ride-sharing, or taxis. Separate yourself from other people and animals in your home  People: As much as possible, you should stay in a specific room and away from other people in your home. Also, you should use a separate bathroom, if available. Animals: You should restrict contact with pets and other animals while you are sick with COVID-19, just like you would around other people. Although there have not been reports of pets or other animals becoming sick with COVID-19, it is still recommended that people sick with COVID-19 limit contact with animals until more information is known about the virus. When possible, have another member of your household care for your animals while you are sick. If you are sick with COVID-19, avoid contact with your pet, including petting, snuggling, being kissed or licked, and sharing food. If you must care for your pet or be around animals while you are sick, wash your hands before and after you interact with pets and wear a facemask. Call ahead before visiting your doctor  If you have a medical appointment, call the healthcare provider and tell them that you have or may have COVID-19. This will help the healthcare providers office take steps to keep other people from getting infected or exposed. Wear a facemask  You should wear a facemask when you are around other people (e.g., sharing a room or vehicle) or pets and before you enter a healthcare providers office.  If you are not able to wear a facemask (for example, because breathing). Before seeking care, call your healthcare provider and tell them that you have, or are being evaluated for, COVID-19. Put on a facemask before you enter the facility. These steps will help the healthcare providers office to keep other people in the office or waiting room from getting infected or exposed. Ask your healthcare provider to call the local or state health department. Persons who are placed under active monitoring or facilitated self-monitoring should follow instructions provided by their local health department or occupational health professionals, as appropriate. When working with your local health department check their available hours. If you have a medical emergency and need to call 911, notify the dispatch personnel that you have, or are being evaluated for COVID-19. If possible, put on a facemask before emergency medical services arrive. Discontinuing home isolation  Patients with confirmed COVID-19 should remain under home isolation precautions until the risk of secondary transmission to others is thought to be low. The decision to discontinue home isolation precautions should be made on a case-by-case basis, in consultation with healthcare providers and state and local health departments.

## 2020-11-10 LAB
PERFORMING LAB: NORMAL
REPORT: NORMAL
SARS-COV-2: NOT DETECTED

## 2020-11-28 ENCOUNTER — HOSPITAL ENCOUNTER (EMERGENCY)
Age: 60
Discharge: HOME | End: 2020-11-28
Payer: MEDICARE

## 2020-11-28 VITALS
DIASTOLIC BLOOD PRESSURE: 68 MMHG | HEIGHT: 66 IN | WEIGHT: 182 LBS | RESPIRATION RATE: 16 BRPM | SYSTOLIC BLOOD PRESSURE: 135 MMHG | BODY MASS INDEX: 29.25 KG/M2 | OXYGEN SATURATION: 96 % | HEART RATE: 92 BPM | TEMPERATURE: 96.9 F

## 2020-11-28 PROCEDURE — 99213 OFFICE O/P EST LOW 20 MIN: CPT | Performed by: NURSE PRACTITIONER

## 2020-11-28 PROCEDURE — 99214 OFFICE O/P EST MOD 30 MIN: CPT

## 2020-11-28 RX ORDER — PROPARACAINE HYDROCHLORIDE 5 MG/ML
1 SOLUTION/ DROPS OPHTHALMIC ONCE
Status: DISCONTINUED | OUTPATIENT
Start: 2020-11-28 | End: 2020-11-28 | Stop reason: HOSPADM

## 2020-11-28 RX ORDER — GENTAMICIN SULFATE 3 MG/ML
1 SOLUTION/ DROPS OPHTHALMIC EVERY 4 HOURS
Qty: 1 BOTTLE | Refills: 0 | Status: SHIPPED | OUTPATIENT
Start: 2020-11-28 | End: 2020-12-08

## 2020-11-28 ASSESSMENT — ENCOUNTER SYMPTOMS
EYE REDNESS: 1
RESPIRATORY NEGATIVE: 1
EYE PAIN: 1
EYE DISCHARGE: 1
PHOTOPHOBIA: 1
GASTROINTESTINAL NEGATIVE: 1

## 2020-11-28 ASSESSMENT — VISUAL ACUITY
OS: 20/50
OD: 20/40
OU: 20/40

## 2020-11-28 ASSESSMENT — PAIN SCALES - GENERAL: PAINLEVEL_OUTOF10: 8

## 2020-11-28 ASSESSMENT — PAIN DESCRIPTION - LOCATION: LOCATION: EYE

## 2020-11-28 NOTE — ED PROVIDER NOTES
[facility]gent [facility] Encounter       CHIEF COMPLAINT       Chief Complaint   Patient presents with    Eye Pain       Nurses Notes reviewed and I agree except as noted in the HPI. HISTORY OF PRESENT ILLNESS   Stephanie Swenson is a 63 y.o. female who presents to urgent care with right eye pain and watery discharge. She states on Thursday she was taking turkey out of refrigerator and slip and hit herself in the right eye. She has had right eye pain and watery discharge since that time. Her vision is a little blurry and sensitive to light. REVIEW OF SYSTEMS     Review of Systems   Constitutional: Positive for activity change. HENT: Negative. Eyes: Positive for photophobia, pain, discharge, redness and visual disturbance (blurry ). Respiratory: Negative. Cardiovascular: Negative. Gastrointestinal: Negative. Genitourinary: Negative. Musculoskeletal: Negative. Neurological: Negative. PAST MEDICAL HISTORY         Diagnosis Date    Arthritis     Asthma     Atypical chest pain     Atypical chest discomfort in hospital multiple times with stress test being normal.     Bell's palsy     COPD (chronic obstructive pulmonary disease) (HCC)     Costochondritis     Diabetes mellitus (HCC)     Fatigue     FH: CAD (coronary artery disease)     Strong family history of CAD.  High blood pressure     High cholesterol     Joint pain     Nonobstructive CAD (coronary artery disease)     Pseudoaneurysm (HCC)     Patient developed small pseudoaneurysm in right groin requiring thrombin injection by Dr. Pierre Garcia which was successful.  Tobacco abuse     Tobacco abuse on Habitrol patch.  Wheezing        SURGICALHISTORY     Patient  has a past surgical history that includes partial hysterectomy (cervix not removed) (1999); Rotator cuff repair (Bilateral, 2007); cardiovascular stress test (10 02 2009); doppler echocardiography (10 02 2009);  Diagnostic Cardiac Cath Lab Procedure (10 12 2009); back surgery (12/05/2016); Colonoscopy; and Colonoscopy (N/A, 2/13/2019). CURRENT MEDICATIONS       Discharge Medication List as of 11/28/2020  1:02 PM      CONTINUE these medications which have NOT CHANGED    Details   Probiotic Product (PROBIOTIC DAILY PO) Take 1 capsule by mouth dailyHistorical Med      metoprolol succinate (TOPROL XL) 50 MG extended release tablet Take 1 tablet by mouth daily, Disp-90 tablet,R-3Normal      fluticasone (FLONASE) 50 MCG/ACT nasal spray 2 sprays by Nasal route daily Apply daily to each nare, Disp-1 Bottle, R-0Normal      acetaminophen (APAP EXTRA STRENGTH) 500 MG tablet Take 1 tablet by mouth every 6 hours as needed for Pain, Disp-120 tablet, R-0Print      Lifitegrast (XIIDRA OP) Apply to eyeHistorical Med      Omega-3 Fatty Acids (FISH OIL PO) Take by mouthHistorical Med      albuterol sulfate HFA (PROAIR HFA) 108 (90 Base) MCG/ACT inhaler Inhale 2 puffs into the lungs every 6 hours as needed for WheezingHistorical Med      potassium chloride (KLOR-CON M) 20 MEQ extended release tablet Take 0.5 tablets by mouth daily, Disp-60 tablet, R-3Adjust Sig      buPROPion (WELLBUTRIN XL) 300 MG extended release tablet Take 300 mg by mouth every morningHistorical Med      carboxymethylcellulose 1 % ophthalmic solution Place 1-2 drops into both eyes as needed for Dry EyesHistorical Med      glucose blood VI test strips (FREESTYLE LITE) strip Disp-90 strip, R-1, NormalTesting once daily      tramadol (ULTRAM) 50 MG tablet Take 100 mg by mouth every 8 hours as needed for Pain. Alexis Rathamanda Historical Med      amlodipine (NORVASC) 5 MG tablet Take 5 mg by mouth daily. sitagliptan-metformin (JANUMET)  MG per tablet Take 1 tablet by mouth 2 times daily. pravastatin (PRAVACHOL) 40 MG tablet Take 40 mg by mouth daily Historical Med      aspirin 81 MG EC tablet Take 81 mg by mouth daily.                ALLERGIES     Patient is is allergic to morphine. Patients   There is no immunization history on file for this patient. FAMILY HISTORY     Patient's family history includes Heart Defect in her brother and brother; Heart Disease in her mother; Heart Disease (age of onset: 43) in her sister; Heart Disease (age of onset: 55) in her sister; High Cholesterol in her mother; Hypertension in her mother; Pacemaker in her mother. SOCIAL HISTORY     Patient  reports that she has been smoking cigarettes. She has a 17.50 pack-year smoking history. She has never used smokeless tobacco. She reports current alcohol use. She reports that she does not use drugs. PHYSICAL EXAM     ED TRIAGE VITALS  BP: 135/68, Temp: 96.9 °F (36.1 °C), Pulse: 92, Resp: 16, SpO2: 96 %,Estimated body mass index is 29.38 kg/m² as calculated from the following:    Height as of this encounter: 5' 6\" (1.676 m). Weight as of this encounter: 182 lb (82.6 kg). ,No LMP recorded. Patient has had a hysterectomy. Physical Exam  Constitutional:       Appearance: Normal appearance. HENT:      Head: Normocephalic. Mouth/Throat:      Mouth: Mucous membranes are moist.   Eyes:      General:         Right eye: Discharge (erythema, swelling, tenderness , abraision) present. Pupils: Pupils are equal, round, and reactive to light. Comments: Vision   R- 20/40  L- 20/50  B- 20/40   Neurological:      Mental Status: She is alert. DIAGNOSTIC RESULTS     Labs:No results found for this visit on 11/28/20. IMAGING:    No orders to display           URGENT CARE COURSE:     Vitals:    11/28/20 1231   BP: 135/68   Pulse: 92   Resp: 16   Temp: 96.9 °F (36.1 °C)   TempSrc: Temporal   SpO2: 96%   Weight: 182 lb (82.6 kg)   Height: 5' 6\" (1.676 m)       Medications - No data to display         PROCEDURES:  None    FINAL IMPRESSION      1. Eye abrasion, right, sequela          DISPOSITION/ PLAN   Plan is to discharge home, she has eye abrasion noted, pain was better at discharge. Discussed eye drops with patient and all instructions including follow up with eye doctor. Verbalized understanding of all discharge instructions.    Antibiotic drops as prescribed  Cool compresses for swelling  Follow up with eye doctor in one week for a recheck   If symptoms worsen go to ER     PATIENT REFERRED TO:  Meño Monson MD  [address] [facility]      DISCHARGE MEDICATIONS:  Discharge Medication List as of 11/28/2020  1:02 PM      START taking these medications    Details   gentamicin (GARAMYCIN) 0.3 % ophthalmic solution Place 1 drop into the right eye every 4 hours for 10 days, Disp-1 Bottle,R-0Normal             Discharge Medication List as of 11/28/2020  1:02 PM          Discharge Medication List as of 11/28/2020  1:02 PM          LUMA Gupta CNP    (Please note that portions of this note were completed with a voice recognition program. Efforts were made to edit the dictations but occasionally words are mis-transcribed.)           LUMA Gupta CNP  11/28/20 9686

## 2020-11-28 NOTE — ED NOTES
To [facility] with complaints of taking a turkey out of the fridge and hand slipped and she hit herself in the right eye Thursday. States it is painful and watery. Has been using some drops from home - one OTC drop and one allergy drop.       Jericho Starr RN  11/28/20 9621

## 2020-11-28 NOTE — ED NOTES
Pt discharged. Pt verbalized understanding of discharge instructions. Pt walked out per self in stable condition.      Janette Guzman LPN  60/87/01 9913

## 2020-12-24 ENCOUNTER — APPOINTMENT (OUTPATIENT)
Dept: GENERAL RADIOLOGY | Age: 60
End: 2020-12-24
Payer: MEDICARE

## 2020-12-24 ENCOUNTER — HOSPITAL ENCOUNTER (EMERGENCY)
Age: 60
Discharge: HOME | End: 2020-12-24
Attending: EMERGENCY MEDICINE
Payer: MEDICARE

## 2020-12-24 VITALS
TEMPERATURE: 97.4 F | RESPIRATION RATE: 18 BRPM | BODY MASS INDEX: 29.25 KG/M2 | WEIGHT: 182 LBS | HEART RATE: 80 BPM | DIASTOLIC BLOOD PRESSURE: 75 MMHG | HEIGHT: 66 IN | OXYGEN SATURATION: 100 % | SYSTOLIC BLOOD PRESSURE: 132 MMHG

## 2020-12-24 LAB
ALBUMIN SERPL-MCNC: 4 G/DL (ref 3.5–5.1)
ALP BLD-CCNC: 94 U/L (ref 38–126)
ALT SERPL-CCNC: 27 U/L (ref 11–66)
ANION GAP SERPL CALCULATED.3IONS-SCNC: 13 MEQ/L (ref 8–16)
AST SERPL-CCNC: 24 U/L (ref 5–40)
ATYPICAL LYMPHOCYTES: ABNORMAL %
BASOPHILS # BLD: 0.6 %
BASOPHILS ABSOLUTE: 0.1 THOU/MM3 (ref 0–0.1)
BILIRUB SERPL-MCNC: 0.3 MG/DL (ref 0.3–1.2)
BUN BLDV-MCNC: 13 MG/DL (ref 7–22)
CALCIUM SERPL-MCNC: 9 MG/DL (ref 8.5–10.5)
CHLORIDE BLD-SCNC: 102 MEQ/L (ref 98–111)
CO2: 23 MEQ/L (ref 23–33)
CREAT SERPL-MCNC: 0.9 MG/DL (ref 0.4–1.2)
EOSINOPHIL # BLD: 3.2 %
EOSINOPHILS ABSOLUTE: 0.4 THOU/MM3 (ref 0–0.4)
ERYTHROCYTE [DISTWIDTH] IN BLOOD BY AUTOMATED COUNT: 11.7 % (ref 11.5–14.5)
ERYTHROCYTE [DISTWIDTH] IN BLOOD BY AUTOMATED COUNT: 39.8 FL (ref 35–45)
GFR SERPL CREATININE-BSD FRML MDRD: 77 ML/MIN/1.73M2
GLUCOSE BLD-MCNC: 148 MG/DL (ref 70–108)
HCT VFR BLD CALC: 39.3 % (ref 37–47)
HEMOGLOBIN: 13.6 GM/DL (ref 12–16)
IMMATURE GRANS (ABS): 0.04 THOU/MM3 (ref 0–0.07)
IMMATURE GRANULOCYTES: 0.4 %
LYMPHOCYTES # BLD: 40.4 %
LYMPHOCYTES ABSOLUTE: 4.6 THOU/MM3 (ref 1–4.8)
MCH RBC QN AUTO: 32.5 PG (ref 26–33)
MCHC RBC AUTO-ENTMCNC: 34.6 GM/DL (ref 32.2–35.5)
MCV RBC AUTO: 93.8 FL (ref 81–99)
MONOCYTES # BLD: 7 %
MONOCYTES ABSOLUTE: 0.8 THOU/MM3 (ref 0.4–1.3)
NUCLEATED RED BLOOD CELLS: 0 /100 WBC
OSMOLALITY CALCULATION: 278.5 MOSMOL/KG (ref 275–300)
PLATELET # BLD: 310 THOU/MM3 (ref 130–400)
PLATELET ESTIMATE: ADEQUATE
PMV BLD AUTO: 11 FL (ref 9.4–12.4)
POTASSIUM REFLEX MAGNESIUM: 3.6 MEQ/L (ref 3.5–5.2)
RBC # BLD: 4.19 MILL/MM3 (ref 4.2–5.4)
SCAN OF BLOOD SMEAR: NORMAL
SEG NEUTROPHILS: 48.4 %
SEGMENTED NEUTROPHILS ABSOLUTE COUNT: 5.5 THOU/MM3 (ref 1.8–7.7)
SODIUM BLD-SCNC: 138 MEQ/L (ref 135–145)
TOTAL PROTEIN: 6.4 G/DL (ref 6.1–8)
TROPONIN T: < 0.01 NG/ML
WBC # BLD: 11.3 THOU/MM3 (ref 4.8–10.8)

## 2020-12-24 PROCEDURE — 99285 EMERGENCY DEPT VISIT HI MDM: CPT

## 2020-12-24 PROCEDURE — 6360000002 HC RX W HCPCS: Performed by: EMERGENCY MEDICINE

## 2020-12-24 PROCEDURE — 96374 THER/PROPH/DIAG INJ IV PUSH: CPT

## 2020-12-24 PROCEDURE — 36415 COLL VENOUS BLD VENIPUNCTURE: CPT

## 2020-12-24 PROCEDURE — 84484 ASSAY OF TROPONIN QUANT: CPT

## 2020-12-24 PROCEDURE — 80053 COMPREHEN METABOLIC PANEL: CPT

## 2020-12-24 PROCEDURE — 85025 COMPLETE CBC W/AUTO DIFF WBC: CPT

## 2020-12-24 PROCEDURE — 93005 ELECTROCARDIOGRAM TRACING: CPT | Performed by: NURSE PRACTITIONER

## 2020-12-24 PROCEDURE — 71045 X-RAY EXAM CHEST 1 VIEW: CPT

## 2020-12-24 RX ORDER — KETOROLAC TROMETHAMINE 30 MG/ML
30 INJECTION, SOLUTION INTRAMUSCULAR; INTRAVENOUS ONCE
Status: COMPLETED | OUTPATIENT
Start: 2020-12-24 | End: 2020-12-24

## 2020-12-24 RX ORDER — ASPIRIN 81 MG/1
324 TABLET, CHEWABLE ORAL ONCE
Status: DISCONTINUED | OUTPATIENT
Start: 2020-12-24 | End: 2020-12-25 | Stop reason: HOSPADM

## 2020-12-24 RX ADMIN — KETOROLAC TROMETHAMINE 30 MG: 30 INJECTION, SOLUTION INTRAMUSCULAR at 21:41

## 2020-12-24 ASSESSMENT — PAIN DESCRIPTION - ORIENTATION: ORIENTATION: LEFT

## 2020-12-24 ASSESSMENT — PAIN DESCRIPTION - FREQUENCY
FREQUENCY: CONTINUOUS
FREQUENCY: CONTINUOUS

## 2020-12-24 ASSESSMENT — PAIN DESCRIPTION - DESCRIPTORS
DESCRIPTORS: ACHING
DESCRIPTORS: ACHING

## 2020-12-24 ASSESSMENT — ENCOUNTER SYMPTOMS
EYE REDNESS: 0
VOMITING: 0
SHORTNESS OF BREATH: 0
TROUBLE SWALLOWING: 0
ABDOMINAL PAIN: 0
BACK PAIN: 0
COUGH: 0
NAUSEA: 0

## 2020-12-24 ASSESSMENT — PAIN DESCRIPTION - LOCATION
LOCATION: CHEST;SHOULDER
LOCATION: CHEST;SHOULDER

## 2020-12-24 ASSESSMENT — PAIN DESCRIPTION - PAIN TYPE
TYPE: ACUTE PAIN
TYPE: ACUTE PAIN

## 2020-12-24 ASSESSMENT — PAIN SCALES - GENERAL
PAINLEVEL_OUTOF10: 8
PAINLEVEL_OUTOF10: 8

## 2020-12-25 ENCOUNTER — APPOINTMENT (OUTPATIENT)
Dept: GENERAL RADIOLOGY | Age: 60
End: 2020-12-25
Payer: MEDICARE

## 2020-12-25 ENCOUNTER — HOSPITAL ENCOUNTER (EMERGENCY)
Age: 60
Discharge: HOME | End: 2020-12-25
Payer: MEDICARE

## 2020-12-25 VITALS
HEIGHT: 66 IN | HEART RATE: 67 BPM | OXYGEN SATURATION: 100 % | TEMPERATURE: 97.5 F | RESPIRATION RATE: 18 BRPM | BODY MASS INDEX: 29.25 KG/M2 | SYSTOLIC BLOOD PRESSURE: 146 MMHG | DIASTOLIC BLOOD PRESSURE: 71 MMHG | WEIGHT: 182 LBS

## 2020-12-25 LAB
ALBUMIN SERPL-MCNC: 4 G/DL (ref 3.5–5.1)
ALP BLD-CCNC: 92 U/L (ref 38–126)
ALT SERPL-CCNC: 28 U/L (ref 11–66)
ANION GAP SERPL CALCULATED.3IONS-SCNC: 12 MEQ/L (ref 8–16)
AST SERPL-CCNC: 28 U/L (ref 5–40)
BASOPHILS # BLD: 0.8 %
BASOPHILS ABSOLUTE: 0.1 THOU/MM3 (ref 0–0.1)
BILIRUB SERPL-MCNC: 0.3 MG/DL (ref 0.3–1.2)
BUN BLDV-MCNC: 11 MG/DL (ref 7–22)
CALCIUM SERPL-MCNC: 8.9 MG/DL (ref 8.5–10.5)
CHLORIDE BLD-SCNC: 104 MEQ/L (ref 98–111)
CO2: 22 MEQ/L (ref 23–33)
CREAT SERPL-MCNC: 0.6 MG/DL (ref 0.4–1.2)
D-DIMER QUANTITATIVE: 426 NG/ML FEU (ref 0–500)
EKG ATRIAL RATE: 66 BPM
EKG ATRIAL RATE: 76 BPM
EKG P AXIS: 63 DEGREES
EKG P AXIS: 74 DEGREES
EKG P-R INTERVAL: 160 MS
EKG P-R INTERVAL: 166 MS
EKG Q-T INTERVAL: 404 MS
EKG Q-T INTERVAL: 430 MS
EKG QRS DURATION: 82 MS
EKG QRS DURATION: 88 MS
EKG QTC CALCULATION (BAZETT): 450 MS
EKG QTC CALCULATION (BAZETT): 454 MS
EKG R AXIS: 42 DEGREES
EKG R AXIS: 44 DEGREES
EKG T AXIS: 40 DEGREES
EKG T AXIS: 48 DEGREES
EKG VENTRICULAR RATE: 66 BPM
EKG VENTRICULAR RATE: 76 BPM
EOSINOPHIL # BLD: 4.5 %
EOSINOPHILS ABSOLUTE: 0.4 THOU/MM3 (ref 0–0.4)
ERYTHROCYTE [DISTWIDTH] IN BLOOD BY AUTOMATED COUNT: 11.5 % (ref 11.5–14.5)
ERYTHROCYTE [DISTWIDTH] IN BLOOD BY AUTOMATED COUNT: 39.4 FL (ref 35–45)
GFR SERPL CREATININE-BSD FRML MDRD: > 90 ML/MIN/1.73M2
GLUCOSE BLD-MCNC: 156 MG/DL (ref 70–108)
HCT VFR BLD CALC: 40.4 % (ref 37–47)
HEMOGLOBIN: 14 GM/DL (ref 12–16)
IMMATURE GRANS (ABS): 0.02 THOU/MM3 (ref 0–0.07)
IMMATURE GRANULOCYTES: 0.2 %
LYMPHOCYTES # BLD: 30.2 %
LYMPHOCYTES ABSOLUTE: 2.6 THOU/MM3 (ref 1–4.8)
MCH RBC QN AUTO: 32.3 PG (ref 26–33)
MCHC RBC AUTO-ENTMCNC: 34.7 GM/DL (ref 32.2–35.5)
MCV RBC AUTO: 93.3 FL (ref 81–99)
MONOCYTES # BLD: 8.7 %
MONOCYTES ABSOLUTE: 0.7 THOU/MM3 (ref 0.4–1.3)
NUCLEATED RED BLOOD CELLS: 0 /100 WBC
OSMOLALITY CALCULATION: 278.3 MOSMOL/KG (ref 275–300)
PLATELET # BLD: 282 THOU/MM3 (ref 130–400)
PMV BLD AUTO: 10.5 FL (ref 9.4–12.4)
POTASSIUM SERPL-SCNC: 4 MEQ/L (ref 3.5–5.2)
RBC # BLD: 4.33 MILL/MM3 (ref 4.2–5.4)
SEG NEUTROPHILS: 55.6 %
SEGMENTED NEUTROPHILS ABSOLUTE COUNT: 4.7 THOU/MM3 (ref 1.8–7.7)
SODIUM BLD-SCNC: 138 MEQ/L (ref 135–145)
TOTAL PROTEIN: 6.4 G/DL (ref 6.1–8)
TROPONIN T: < 0.01 NG/ML
TROPONIN T: < 0.01 NG/ML
WBC # BLD: 8.5 THOU/MM3 (ref 4.8–10.8)

## 2020-12-25 PROCEDURE — 6360000002 HC RX W HCPCS: Performed by: NURSE PRACTITIONER

## 2020-12-25 PROCEDURE — 93010 ELECTROCARDIOGRAM REPORT: CPT | Performed by: NUCLEAR MEDICINE

## 2020-12-25 PROCEDURE — 96374 THER/PROPH/DIAG INJ IV PUSH: CPT

## 2020-12-25 PROCEDURE — 84484 ASSAY OF TROPONIN QUANT: CPT

## 2020-12-25 PROCEDURE — 99285 EMERGENCY DEPT VISIT HI MDM: CPT

## 2020-12-25 PROCEDURE — 80053 COMPREHEN METABOLIC PANEL: CPT

## 2020-12-25 PROCEDURE — 36415 COLL VENOUS BLD VENIPUNCTURE: CPT

## 2020-12-25 PROCEDURE — 85379 FIBRIN DEGRADATION QUANT: CPT

## 2020-12-25 PROCEDURE — 93005 ELECTROCARDIOGRAM TRACING: CPT | Performed by: EMERGENCY MEDICINE

## 2020-12-25 PROCEDURE — 94761 N-INVAS EAR/PLS OXIMETRY MLT: CPT

## 2020-12-25 PROCEDURE — 85025 COMPLETE CBC W/AUTO DIFF WBC: CPT

## 2020-12-25 PROCEDURE — 71046 X-RAY EXAM CHEST 2 VIEWS: CPT

## 2020-12-25 RX ORDER — KETOROLAC TROMETHAMINE 30 MG/ML
30 INJECTION, SOLUTION INTRAMUSCULAR; INTRAVENOUS ONCE
Status: COMPLETED | OUTPATIENT
Start: 2020-12-25 | End: 2020-12-25

## 2020-12-25 RX ORDER — MELOXICAM 7.5 MG/1
7.5 TABLET ORAL DAILY
Qty: 30 TABLET | Refills: 3 | Status: SHIPPED | OUTPATIENT
Start: 2020-12-25 | End: 2021-02-17 | Stop reason: ALTCHOICE

## 2020-12-25 RX ADMIN — KETOROLAC TROMETHAMINE 30 MG: 30 INJECTION, SOLUTION INTRAMUSCULAR at 10:15

## 2020-12-25 ASSESSMENT — ENCOUNTER SYMPTOMS
NAUSEA: 0
ABDOMINAL PAIN: 0
PHOTOPHOBIA: 0
SINUS PRESSURE: 0
TROUBLE SWALLOWING: 0
SORE THROAT: 0
COUGH: 0
VOMITING: 0
COLOR CHANGE: 0
SHORTNESS OF BREATH: 0
WHEEZING: 0
RHINORRHEA: 0
CHEST TIGHTNESS: 0
SINUS PAIN: 0
BACK PAIN: 0
CONSTIPATION: 0
DIARRHEA: 0

## 2020-12-25 ASSESSMENT — PAIN DESCRIPTION - ORIENTATION
ORIENTATION: LEFT
ORIENTATION: LEFT

## 2020-12-25 ASSESSMENT — PAIN SCALES - GENERAL
PAINLEVEL_OUTOF10: 8
PAINLEVEL_OUTOF10: 8
PAINLEVEL_OUTOF10: 6

## 2020-12-25 ASSESSMENT — PAIN DESCRIPTION - LOCATION
LOCATION: CHEST
LOCATION: CHEST

## 2020-12-25 ASSESSMENT — PAIN DESCRIPTION - PAIN TYPE
TYPE: ACUTE PAIN
TYPE: ACUTE PAIN

## 2020-12-25 ASSESSMENT — PAIN DESCRIPTION - FREQUENCY
FREQUENCY: CONTINUOUS
FREQUENCY: CONTINUOUS

## 2020-12-25 NOTE — ED NOTES
Patient resting quietly in cot showing no signs of distress at this time. Rates pain 4/10. Updated on POC. Will continue to monitor.       Ariadna Hill, [address] [facility]  12/25/20 9794

## 2020-12-25 NOTE — ED NOTES
Pt medicated per MAR. Pt tolerated well. Pt provided with blanket and lights dimmed for comfort. Will monitor.      Nereyda Gaytan RN  12/24/20 4116

## 2020-12-25 NOTE — ED PROVIDER NOTES
Jennifer Rivas [facility] COMPLAINT       Chief Complaint   Patient presents with    Chest Pain     left       Nurses Notes reviewed and I agree except as noted in the HPI. HISTORY OF PRESENT ILLNESS    Genesis Rivas is a 63 y.o. female who presents to the Emergency Department for the evaluation of midsternal and left-sided chest pain radiating down left arm. Reports that she was seen in the emergency department yesterday around 8 PM for chest pain, diagnosed with musculoskeletal pain and discharged home. Reports that she felt better when she was discharged, went home and went to sleep, woke up this morning at 3:30 AM with stabbing chest pain radiating down her left arm. States the pain is more severe than yesterday. She denies shortness of breath, denies sick contacts. Follows with Dr. Aliza Johnson from cardiology, has a stress test scheduled for Monday. Reports that she has taken aspirin and tramadol at home for pain without relief, has had nitroglycerin prescribed, states that she has not taken it in \"a while\". Has been under increased stress due to the holidays and COVID-19 pandemic, reports that she has been smoking more than usual for her. The HPI was provided by the patient. REVIEW OF SYSTEMS     Review of Systems   Constitutional: Negative for chills, diaphoresis, fatigue and fever. HENT: Negative for congestion, ear pain, nosebleeds, rhinorrhea, sinus pressure, sinus pain, sore throat and trouble swallowing. Eyes: Negative for photophobia. Respiratory: Negative for cough, chest tightness, shortness of breath and wheezing. Cardiovascular: Positive for chest pain. Negative for palpitations and leg swelling. Gastrointestinal: Negative for abdominal pain, constipation, diarrhea, nausea and vomiting. Endocrine: Negative for cold intolerance and heat intolerance.    Genitourinary: Negative for difficulty urinating, dysuria, flank pain, hematuria, pelvic pain, vaginal bleeding, vaginal discharge and vaginal pain. Musculoskeletal: Negative for arthralgias, back pain, joint swelling, myalgias and neck stiffness. Skin: Negative for color change and wound. Neurological: Negative for dizziness, weakness, light-headedness, numbness and headaches. Psychiatric/Behavioral: Negative for agitation, behavioral problems, confusion, hallucinations, self-injury and suicidal ideas. The patient is not nervous/anxious. PAST MEDICAL HISTORY    has a past medical history of Arthritis, Asthma, Atypical chest pain, Bell's palsy, COPD (chronic obstructive pulmonary disease) ([address] Costochondritis, Diabetes mellitus ([address] Fatigue, FH: CAD (coronary artery disease), High blood pressure, High cholesterol, Joint pain, Nonobstructive CAD (coronary artery disease), Pseudoaneurysm ([address] Tobacco abuse, and Wheezing. SURGICAL HISTORY      has a past surgical history that includes partial hysterectomy (cervix not removed) (1999); Rotator cuff repair (Bilateral, 2007); cardiovascular stress test (10 02 2009); doppler echocardiography (10 02 2009); Diagnostic Cardiac Cath Lab Procedure (10 12 2009); back surgery (12/05/2016); Colonoscopy; and Colonoscopy (N/A, 2/13/2019).     CURRENT MEDICATIONS       Discharge Medication List as of 12/25/2020 12:48 PM      CONTINUE these medications which have NOT CHANGED    Details   Probiotic Product (PROBIOTIC DAILY PO) Take 1 capsule by mouth dailyHistorical Med      metoprolol succinate (TOPROL XL) 50 MG extended release tablet Take 1 tablet by mouth daily, Disp-90 tablet,R-3Normal      fluticasone (FLONASE) 50 MCG/ACT nasal spray 2 sprays by Nasal route daily Apply daily to each nare, Disp-1 Bottle, R-0Normal      acetaminophen (APAP EXTRA STRENGTH) 500 MG tablet Take 1 tablet by mouth every 6 hours as needed for Pain, Disp-120 tablet, R-0Print      Lifitegrast (XIIDRA OP) Apply to eyeHistorical Med      Omega-3 Fatty Acids respiration is 18 and oxygen saturation is 100%. Physical Exam  Vitals signs and nursing note reviewed. Constitutional:       General: She is awake. She is not in acute distress. Appearance: Normal appearance. She is well-developed and normal weight. She is not ill-appearing, toxic-appearing or diaphoretic. HENT:      Head: Normocephalic and atraumatic. Right Ear: Tympanic membrane normal.      Left Ear: Tympanic membrane normal.      Nose: Nose normal.      Mouth/Throat:      Mouth: Mucous membranes are moist.      Pharynx: Oropharynx is clear. Eyes:      Extraocular Movements: Extraocular movements intact. Pupils: Pupils are equal, round, and reactive to light. Neck:      Musculoskeletal: Normal range of motion and neck supple. No neck rigidity or muscular tenderness. Vascular: No carotid bruit. Cardiovascular:      Rate and Rhythm: Normal rate and regular rhythm. Pulses: Normal pulses. Heart sounds: Normal heart sounds, S1 normal and S2 normal. Heart sounds not distant. No murmur. No friction rub. No gallop. Pulmonary:      Effort: Pulmonary effort is normal. No tachypnea, bradypnea, accessory muscle usage, prolonged expiration, respiratory distress or retractions. Breath sounds: Normal breath sounds. Abdominal:      General: Abdomen is flat. Bowel sounds are normal. There is no distension or abdominal bruit. There are no signs of injury. Palpations: Abdomen is soft. There is no shifting dullness, fluid wave, hepatomegaly, splenomegaly, mass or pulsatile mass. Tenderness: There is no abdominal tenderness. There is no guarding or rebound. Hernia: No hernia is present. Musculoskeletal: Normal range of motion. General: No swelling, tenderness, deformity or signs of injury. Right lower leg: No edema. Left lower leg: No edema. Lymphadenopathy:      Cervical: No cervical adenopathy. Skin:     General: Skin is warm and dry. FILTRATION RATE, ESTIMATED   OSMOLALITY   TROPONIN       EMERGENCY DEPARTMENT COURSE:   Vitals:    Vitals:    12/25/20 1017 12/25/20 1139 12/25/20 1243 12/25/20 1305   BP: (!) 152/74 (!) 152/73 (!) 146/71    Pulse: 67 63 67    Resp: 16 18 18    Temp:       TempSrc:       SpO2: 98% 95% 99% 100%   Weight:       Height:           9:32 AM EST: The patient was seen and evaluated. Appropriate labs and imaging ordered. MDM:      CRITICAL CARE:   Patient seen evaluated for left-sided chest pain. Patient was seen yesterday for the same. On my initial exam she was in no acute distress. Appropriate labs and imaging were ordered. As patient states that pain started suddenly at 330 this morning she was kept for serial troponins. Found to be negative x2. Also considered PE in my differentials, D-dimer found to be negative. Patient was treated with Toradol with improvement in symptoms. When asked about pain medications at home, patient encouraged to take Tylenol and tramadol at home as she has in the past.  She has a stress test scheduled for Monday, believe that with close follow-up she is stable for discharge home. Return criteria were discussed, all questions were answered. She was amenable to plan and departed the ED in stable condition    CONSULTS:  None    PROCEDURES:  None    FINAL IMPRESSION      1. Chest wall pain    2. Stable angina pectoris [facility])          DISPOSITION/PLAN   Discharge    PATIENT REFERRED TO:  Paitto Mckeon MD  [facility], [address] [place] [address]  [phone]    Go to   Keep scheduled appointment for this coming Monday.       DISCHARGE MEDICATIONS:  Discharge Medication List as of 12/25/2020 12:48 PM      START taking these medications    Details   meloxicam (MOBIC) 7.5 MG tablet Take 1 tablet by mouth daily, Disp-30 tablet, R-3Normal             (Please note that portions of this note were completed with a voice recognition program.  Efforts were made to edit the dictations but occasionally words are mis-transcribed.)    The patient was given an opportunity to see the Emergency Attending. The patient voiced understanding that I was a Mid-LevelProvider and was in agreement with being seen independently by myself.      LUMA Obrien CNP, 12/25/20, 3:31 AM       LUMA Obrien CNP  12/27/20 1042

## 2020-12-25 NOTE — ED NOTES
Patient resting quietly in cot showing no signs of distress at this time. Rates pain 6/10. Given saltine crackers and peanut butter per request.  Updated on POC. Will continue to monitor.       Velasquez Tim RN  12/25/20 9569

## 2020-12-25 NOTE — ED PROVIDER NOTES
demonstrated normal thickening, normal contractility, normal wall motion, EF of 46% which is low normal; however, visualized by myself appears to be 55%. No evidence of prior transmural MI or ischemia. Excellend treadmill exercise. Completed 10 METS, functional class I. No evidence of ischemic -induced EKG changes are noted. Appropriate hemodynamic response to exercise.  COLONOSCOPY      COLONOSCOPY N/A 2/13/2019    COLONOSCOPY POLYPECTOMY SNARE/COLD BIOPSY performed by Lorena Mccray MD at [place] [address] CATH LAB PROCEDURE  10 12 2009    LV demonstrates normal systolic function, EF 35%. No critical lesions are noted in all larger pericardial vessels & show that left main is widely patent. Circumflex is widely patent with anterior marginal one & two being widely patent. Left anterior descending artery is widely patent, diagonal one large caliber vessel, widely patent. Right coronary artery is widely patent & is dominant.  DOPPLER ECHOCARDIOGRAPHY  10 02 2009    LV demonstrated normal thickening, normal contractility, normal motion with mild systolic dilatation of the ventricle noted. EF 60%. Left atrium is mildly dilated. There is stage I diastolic dysfunction.     [address] [facility] [place]    ROTATOR CUFF REPAIR Bilateral 2007       CURRENT MEDICATIONS       Discharge Medication List as of 12/24/2020  9:53 PM      CONTINUE these medications which have NOT CHANGED    Details   Probiotic Product (PROBIOTIC DAILY PO) Take 1 capsule by mouth dailyHistorical Med      metoprolol succinate (TOPROL XL) 50 MG extended release tablet Take 1 tablet by mouth daily, Disp-90 tablet,R-3Normal      fluticasone (FLONASE) 50 MCG/ACT nasal spray 2 sprays by Nasal route daily Apply daily to each nare, Disp-1 Bottle, R-0Normal      acetaminophen (APAP EXTRA STRENGTH) 500 MG tablet Take 1 tablet by mouth every 6 hours as needed for Pain, Disp-120 tablet, R-0Print      Lifitegrast (XIIDRA OP) Apply to eyeHistorical Med      Omega-3 Fatty Acids (FISH OIL PO) Take by mouthHistorical Med      albuterol sulfate HFA (PROAIR HFA) 108 (90 Base) MCG/ACT inhaler Inhale 2 puffs into the lungs every 6 hours as needed for WheezingHistorical Med      potassium chloride (KLOR-CON M) 20 MEQ extended release tablet Take 0.5 tablets by mouth daily, Disp-60 tablet, R-3Adjust Sig      buPROPion (WELLBUTRIN XL) 300 MG extended release tablet Take 300 mg by mouth every morningHistorical Med      carboxymethylcellulose 1 % ophthalmic solution Place 1-2 drops into both eyes as needed for Dry EyesHistorical Med      glucose blood VI test strips (FREESTYLE LITE) strip Disp-90 strip, R-1, NormalTesting once daily      tramadol (ULTRAM) 50 MG tablet Take 100 mg by mouth every 8 hours as needed for Pain. Lorean Snare Historical Med      amlodipine (NORVASC) 5 MG tablet Take 5 mg by mouth daily. sitagliptan-metformin (JANUMET)  MG per tablet Take 1 tablet by mouth 2 times daily. pravastatin (PRAVACHOL) 40 MG tablet Take 40 mg by mouth daily Historical Med      aspirin 81 MG EC tablet Take 81 mg by mouth daily. ALLERGIES     Morphine    FAMILY HISTORY       Family History   Problem Relation Age of Onset    Heart Disease Mother     Hypertension Mother     High Cholesterol Mother     Pacemaker Mother     Heart Defect Brother     Heart Defect Brother     Heart Disease Sister 43        Sister had CABG.  Heart Disease Sister 55        Another sister had CABG.         SOCIAL HISTORY       Social History     Socioeconomic History    Marital status: Single     Spouse name: Not on file    Number of children: Not on file    Years of education: Not on file    Highest education level: Not on file   Occupational History    Not on file   Social Needs    Financial resource strain: Not on file    Food insecurity     Worry: Not on file     Inability: Not on file    Transportation needs     Medical: Not on file Non-medical: Not on file   Tobacco Use    Smoking status: Current Every Day Smoker     Packs/day: 0.50     Years: 35.00     Pack years: 17.50     Types: Cigarettes    Smokeless tobacco: Never Used   Substance and Sexual Activity    Alcohol use: Yes     Alcohol/week: 0.0 standard drinks     Frequency: 2-3 times a week     Drinks per session: 1 or 2     Comment: Patient states, \"occasionally. \"    Drug use: No    Sexual activity: Yes     Partners: Male   Lifestyle    Physical activity     Days per week: Not on file     Minutes per session: Not on file    Stress: Not on file   Relationships    Social connections     Talks on phone: Not on file     Gets together: Not on file     Attends Confucianism service: Not on file     Active member of club or organization: Not on file     Attends meetings of clubs or organizations: Not on file     Relationship status: Not on file    Intimate partner violence     Fear of current or ex partner: Not on file     Emotionally abused: Not on file     Physically abused: Not on file     Forced sexual activity: Not on file   Other Topics Concern    Not on file   Social History Narrative    Not on file       REVIEW OF SYSTEMS     Review of Systems   Constitutional: Negative for fatigue and fever. HENT: Negative for congestion and trouble swallowing. Eyes: Negative for redness. Respiratory: Negative for cough and shortness of breath. Cardiovascular: Positive for chest pain. Gastrointestinal: Negative for abdominal pain, nausea and vomiting. Genitourinary: Negative for dysuria. Musculoskeletal: Negative for back pain. Skin: Negative for rash. Allergic/Immunologic: Negative for immunocompromised state. Neurological: Negative for light-headedness. Hematological: Does not bruise/bleed easily. Except as noted above the remainder of the review of systems was reviewed and is.    PHYSICAL EXAM    (up to 7 for level 4, 8 or more for level 5)     ED Triage Vitals [12/24/20 1958]   BP Temp Temp Source Pulse Resp SpO2 Height Weight   (!) 153/66 97.4 °F (36.3 °C) Oral 83 18 96 % 5' 6\" (1.676 m) 182 lb (82.6 kg)       Physical Exam  Vitals signs and nursing note reviewed. Exam conducted with a chaperone present. Constitutional:       General: She is not in acute distress. Appearance: She is normal weight. She is not ill-appearing. HENT:      Head: Normocephalic and atraumatic. Nose: Nose normal. No congestion. Mouth/Throat:      Mouth: Mucous membranes are moist.      Pharynx: Oropharynx is clear. No oropharyngeal exudate or posterior oropharyngeal erythema. Eyes:      Extraocular Movements: Extraocular movements intact. Pupils: Pupils are equal, round, and reactive to light. Neck:      Musculoskeletal: Neck supple. Cardiovascular:      Rate and Rhythm: Normal rate and regular rhythm. Pulses: Normal pulses. Heart sounds: Normal heart sounds. No murmur. No friction rub. Pulmonary:      Effort: Pulmonary effort is normal. No respiratory distress. Breath sounds: Normal breath sounds. No wheezing. Abdominal:      General: Abdomen is flat. There is no distension. Palpations: Abdomen is soft. Tenderness: There is no abdominal tenderness. There is no guarding or rebound. Musculoskeletal: Normal range of motion. Skin:     General: Skin is warm and dry. Capillary Refill: Capillary refill takes less than 2 seconds. Neurological:      General: No focal deficit present. Mental Status: She is alert and oriented to person, place, and time. DIAGNOSTIC RESULTS     EKG:(none if blank)  All EKG's are [facility] Phys[facility] who either signs or Co-signs this chart in the absence of a cardiologist.        RADIOLOGY: (none if blank)   Interpretation per the Radiologistbelow, if available at the time of this note:    XR CHEST PORTABLE   Final Result   No acute findings.       This document has been electronically signed by: Ilan Ribeiro MD on    12/24/2020 09:09 PM             LABS:  Labs Reviewed   CBC WITH AUTO DIFFERENTIAL - Abnormal; Notable for the following components:       Result Value    WBC 11.3 (*)     RBC 4.19 (*)     All other components within normal limits   COMPREHENSIVE METABOLIC PANEL W/ REFLEX TO MG FOR LOW K - Abnormal; Notable for the following components:    Glucose 148 (*)     All other components within normal limits   GLOMERULAR FILTRATION RATE, ESTIMATED - Abnormal; Notable for the following components:    Est, Glom Filt Rate 77 (*)     All other components within normal limits   TROPONIN   SCAN OF BLOOD SMEAR   ANION GAP   OSMOLALITY       All other labs were within normal range or not returned as of this dictation. Please note, any cultures that may have been sent were not resulted at the time of this patient visit. EMERGENCY DEPARTMENT COURSE andMedical Decision Making:     MDM  Number of Diagnoses or Management Options  Diagnosis management comments: 70-year-old female presents emergency room for chest pain. Differential includes NSTEMI, angina, pneumonia, pneumothorax, dissection, musculoskeletal pain, pericarditis, bronchitis, esophageal spasm, and muscle strain. Will evaluate with CBC, CMP, Trope, chest x-ray. EKG is within normal limits with no evidence of STEMI or ischemia. Patient is allergic to morphine and did not appear overwhelmingly uncomfortable so we will give aspirin here for pain.  /  ED Course as of Dec 25 0035   Thu Dec 24, 2020   2128 Patient spoke with the nurse and stated that she had already taken 4 baby aspirin today. Will discontinue my aspirin order.    [DD]   2200 Patient had relief of pain with the Toradol. Will discharge home.    [DD]      ED Course User Index  [DD] Deisi Estrella MD         The patient was evaluated during the global COVID-19 pandemic, and that diagnosis was considered upon their initial presentation.  Their evaluation, treatment and testing was consistent with current guidelines for patients who present with complaints or symptoms that may be related to COVID-19. Strict returnprecautions and follow up instructions were discussed with the patient with which the patient agrees        ED Medications administered this visit:    Medications   ketorolac (TORADOL) injection 30 mg (30 mg Intravenous Given 12/24/20 7479)         EKG 12 Lead    Date/Time: 12/24/2020 8:13 PM  Performed by: Tracie Wills MD  Authorized by: Tracie Wills MD     ECG reviewed by ED Physician in the absence of a cardiologist: yes    Previous ECG:     Previous ECG:  Unavailable  Interpretation:     Interpretation: normal    Rate:     ECG rate:  76    ECG rate assessment: normal    Rhythm:     Rhythm: sinus rhythm    Ectopy:     Ectopy: none    QRS:     QRS axis:  Normal    QRS intervals:  Normal  Conduction:     Conduction: normal    ST segments:     ST segments:  Normal  T waves:     T waves: normal      : (None if blank)       CLINICAL       1.  Atypical chest pain          DISPOSITION/PLAN   DISPOSITION Decision To Discharge 12/24/2020 09:53:14 PM      PATIENT REFERRED TO:  Gustavo Jesus MD  [address] [facility]  [address]  [address] [facility]  [address]    Schedule an appointment as soon as possible for a visit   If symptoms worsen      DISCHARGE MEDICATIONS:  Discharge Medication List as of 12/24/2020  9:53 PM                 (Please note that portions of this note were completed with a voice recognition program.  Efforts were made to edit the dictations but occasionallywords are mis-transcribed.)      Electronically signed by Bonilla Manuel MD on 12/24/20 at 9:04 PM EST    Attending Physician, Emergency Department       Tracie Wills MD  12/25/20 4718

## 2020-12-25 NOTE — ED TRIAGE NOTES
Patient presents to ED for chest pain that initially began two days ago. States she was evaluated here for same symptoms around 2000hrs last night. States the left sided chest pain has worsened and radiates into left arm. Rates pain 8/10. Shows no signs of distress. Skin warm and dry. Respirations easy and unlabored.

## 2020-12-25 NOTE — ED NOTES
In for hourly rounding. Pt resting on cot in position of comfort. Pt remains A&Ox4, resps easy and unlabored. IV shows no s/s of infection or infiltration. Pt pain remains unchanged at this time. Pt reports already taking a total of baby ASA x4 today, will hold ASA at this time and speak with provider about pain control. Monitor remains in place. Updated pt on POC. Will monitor.      Pernell Ferrell RN  12/24/20 0571

## 2021-02-05 ENCOUNTER — TELEPHONE (OUTPATIENT)
Dept: CARDIOLOGY CLINIC | Age: 61
End: 2021-02-05

## 2021-02-05 NOTE — TELEPHONE ENCOUNTER
Patient called and stated that her pcp had done a chest xray and noted that she appeared to have an enlarged heart and requested that [address] f/u with her cardiologist. Patient stated that she had the CXR done at [facility] and is going to contact them to have them send those results to our office. Patient is scheduled for first available on 03/02/2021. Does she need to be seen sooner?  Please advise  [phone]

## 2021-02-17 ENCOUNTER — OFFICE VISIT (OUTPATIENT)
Dept: CARDIOLOGY CLINIC | Age: 61
End: 2021-02-17
Payer: MEDICARE

## 2021-02-17 VITALS
SYSTOLIC BLOOD PRESSURE: 135 MMHG | DIASTOLIC BLOOD PRESSURE: 81 MMHG | WEIGHT: 198 LBS | HEART RATE: 84 BPM | HEIGHT: 66 IN | BODY MASS INDEX: 31.82 KG/M2

## 2021-02-17 DIAGNOSIS — R06.02 SOB (SHORTNESS OF BREATH) ON EXERTION: ICD-10-CM

## 2021-02-17 DIAGNOSIS — I10 ESSENTIAL HYPERTENSION: Primary | ICD-10-CM

## 2021-02-17 DIAGNOSIS — E78.00 PURE HYPERCHOLESTEROLEMIA: ICD-10-CM

## 2021-02-17 PROCEDURE — G8417 CALC BMI ABV UP PARAM F/U: HCPCS | Performed by: INTERNAL MEDICINE

## 2021-02-17 PROCEDURE — G8427 DOCREV CUR MEDS BY ELIG CLIN: HCPCS | Performed by: INTERNAL MEDICINE

## 2021-02-17 PROCEDURE — 3017F COLORECTAL CA SCREEN DOC REV: CPT | Performed by: INTERNAL MEDICINE

## 2021-02-17 PROCEDURE — 4004F PT TOBACCO SCREEN RCVD TLK: CPT | Performed by: INTERNAL MEDICINE

## 2021-02-17 PROCEDURE — G8484 FLU IMMUNIZE NO ADMIN: HCPCS | Performed by: INTERNAL MEDICINE

## 2021-02-17 PROCEDURE — 99214 OFFICE O/P EST MOD 30 MIN: CPT | Performed by: INTERNAL MEDICINE

## 2021-02-17 RX ORDER — CELECOXIB 100 MG/1
CAPSULE ORAL
COMMUNITY
Start: 2021-02-10 | End: 2021-08-12

## 2021-02-17 RX ORDER — LISINOPRIL AND HYDROCHLOROTHIAZIDE 25; 20 MG/1; MG/1
TABLET ORAL
COMMUNITY
Start: 2021-02-15

## 2021-02-17 RX ORDER — TIZANIDINE 4 MG/1
TABLET ORAL
COMMUNITY
Start: 2021-02-10 | End: 2021-08-12

## 2021-02-17 RX ORDER — LORATADINE 10 MG/1
TABLET ORAL
COMMUNITY
Start: 2021-02-11

## 2021-02-17 RX ORDER — HYDROCODONE BITARTRATE AND ACETAMINOPHEN 5; 325 MG/1; MG/1
TABLET ORAL
COMMUNITY
Start: 2021-02-10

## 2021-02-17 NOTE — PROGRESS NOTES
Chief Complaint   Patient presents with    Follow-up    Hypertension    Coronary Artery Disease         6 month F/u    Pat sent bu pcp for mild cardiomegaly and lAE reported on CXR done dec 2020    EKG done 12/25/2020. Left shoulder pain and worse with moving the left arm -had stress test at [facility] and was informed negative per pat    Sob on marked exertion    Denied  Chest pain, dizzinesss, palpitation or edema      Record reviewed      Patient Active Problem List   Diagnosis    Nonobstructive CAD (coronary artery disease)    Asthma    Diabetes mellitus ([address]    High cholesterol    Wheezing    Fatigue    Joint pain    Arthritis    FH: CAD (coronary artery disease)    Tobacco abuse    Pseudoaneurysm (HCC)    Costochondritis    Bell's palsy    Neck pain    Thyroid nodule    Nicotine abuse    SOB (shortness of breath)    Multinodular goiter    COPD with acute exacerbation (HCC)    Palpitation    Pure hypercholesterolemia    Essential hypertension    Anxiety    Low back pain    Degeneration of lumbar intervertebral disc    Lumbosacral radiculopathy    SOB (shortness of breath) on exertion       Past Surgical History:   Procedure Laterality Date    BACK SURGERY  12/05/2016    CARDIOVASCULAR STRESS TEST  10 02 2009    Gated SPECT LV function demonstrated normal thickening, normal contractility, normal wall motion, EF of 46% which is low normal; however, visualized by myself appears to be 55%. No evidence of prior transmural MI or ischemia. Excellend treadmill exercise. Completed 10 METS, functional class I. No evidence of ischemic -induced EKG changes are noted. Appropriate hemodynamic response to exercise.  COLONOSCOPY      COLONOSCOPY N/A 2/13/2019    COLONOSCOPY POLYPECTOMY SNARE/COLD BIOPSY performed by Veronika Murphy MD at [place] [address] CATH LAB PROCEDURE  10 12 2009    LV demonstrates normal systolic function, EF 58%.  No critical lesions are noted in week: Not on file     Minutes per session: Not on file    Stress: Not on file   Relationships    Social connections     Talks on phone: Not on file     Gets together: Not on file     Attends [employer] service: Not on file     Active member of club or organization: Not on file     Attends meetings of clubs or organizations: Not on file     Relationship status: Not on file    Intimate partner violence     Fear of current or ex partner: Not on file     Emotionally abused: Not on file     Physically abused: Not on file     Forced sexual activity: Not on file   Other Topics Concern    Not on file   Social History Narrative    Not on file       Current Outpatient Medications   Medication Sig Dispense Refill    celecoxib (CELEBREX) 100 MG capsule TAKE 1 CAPSULE BY MOUTH ONCE DAILY      HYDROcodone-acetaminophen ([address]) 5-325 MG per tablet       lisinopril-hydroCHLOROthiazide (PRINZIDE;ZESTORETIC) 20-25 MG per tablet       loratadine (CLARITIN) 10 MG tablet TAKE 1 TABLET BY MOUTH EVERY DAY      tiZANidine (ZANAFLEX) 4 MG tablet TAKE 1 TABLET BY MOUTH ONCE DAILY AT BEDTIME      Probiotic Product (PROBIOTIC DAILY PO) Take 1 capsule by mouth daily      metoprolol succinate (TOPROL XL) 50 MG extended release tablet Take 1 tablet by mouth daily 90 tablet 3    acetaminophen (APAP EXTRA STRENGTH) 500 MG tablet Take 1 tablet by mouth every 6 hours as needed for Pain 120 tablet 0    Lifitegrast (XIIDRA OP) Apply to eye      Omega-3 Fatty Acids (FISH OIL PO) Take by mouth      albuterol sulfate HFA (PROAIR HFA) 108 (90 Base) MCG/ACT inhaler Inhale 2 puffs into the lungs every 6 hours as needed for Wheezing      potassium chloride (KLOR-CON M) 20 MEQ extended release tablet Take 0.5 tablets by mouth daily 60 tablet 3    buPROPion (WELLBUTRIN XL) 300 MG extended release tablet Take 300 mg by mouth every morning      carboxymethylcellulose 1 % ophthalmic solution Place 1-2 drops into both eyes as needed for Dry Eyes  glucose blood VI test strips (FREESTYLE LITE) strip Testing once daily 90 strip 1    tramadol (ULTRAM) 50 MG tablet Take 100 mg by mouth every 8 hours as needed for Pain. Lyndsay Blocker amlodipine (NORVASC) 5 MG tablet Take 5 mg by mouth daily.  sitagliptan-metformin (JANUMET)  MG per tablet Take 1 tablet by mouth 2 times daily.  pravastatin (PRAVACHOL) 40 MG tablet Take 40 mg by mouth daily        No current facility-administered medications for this visit. Review of Systems -     General ROS: negative  Psychological ROS: negative  Hematological and Lymphatic ROS: No history of blood clots or bleeding disorder. Respiratory ROS: no cough,  or wheezing, the rest see HPI  Cardiovascular ROS: See HPI  Gastrointestinal ROS: negative  Genito-Urinary ROS: no dysuria, trouble voiding, or hematuria  Musculoskeletal ROS: negative  Neurological ROS: no TIA or stroke symptoms  Dermatological ROS: negative      Blood pressure 135/81, pulse 84, height 5' 6\" (1.676 m), weight 198 lb (89.8 kg), not currently breastfeeding.         Physical Examination:    General appearance - alert, well appearing, and in no distress  HEENT- Pink conjunctiva  , Non-icteri sclera,PERRLA  Mental status - alert, oriented to person, place, and time  Neck - supple, no significant adenopathy, no JVD, or carotid bruits  Chest - clear to auscultation, no wheezes, rales or rhonchi, symmetric air entry  Heart - normal rate, regular rhythm, normal S1, S2, no murmurs, rubs, clicks or gallops  Abdomen - soft, nontender, nondistended, no masses or organomegaly  CARLENE- no CVA or flank tenderness, no suprapubic tenderness  Neurological - alert, oriented, normal speech, no focal findings or movement disorder noted  Musculoskeletal/limbs - no joint tenderness, deformity or swelling   - peripheral pulses normal, no pedal edema, no clubbing or cyanosis  Skin - normal coloration and turgor, no rashes, no suspicious skin lesions noted  Psych- appropriate mood and affect    Lab  No results for input(s): CKTOTAL, CKMB, CKMBINDEX, TROPONINI in the last 72 hours. CBC:   Lab Results   Component Value Date    WBC 8.5 12/25/2020    RBC 4.33 12/25/2020    RBC 4.46 11/11/2011    HGB 14.0 12/25/2020    HCT 40.4 12/25/2020    MCV 93.3 12/25/2020    MCH 32.3 12/25/2020    MCHC 34.7 12/25/2020    RDW 13.2 05/25/2018     12/25/2020    MPV 10.5 12/25/2020     BMP:    Lab Results   Component Value Date     12/25/2020    K 4.0 12/25/2020    K 3.6 12/24/2020     12/25/2020    CO2 22 12/25/2020    BUN 11 12/25/2020    LABALBU 4.0 12/25/2020    LABALBU 4.4 10/27/2011    CREATININE 0.6 12/25/2020    CALCIUM 8.9 12/25/2020    LABGLOM >90 12/25/2020    GLUCOSE 156 12/25/2020    GLUCOSE 100 10/30/2011     Hepatic Function Panel:    Lab Results   Component Value Date    ALKPHOS 92 12/25/2020    ALT 28 12/25/2020    AST 28 12/25/2020    PROT 6.4 12/25/2020    BILITOT 0.3 12/25/2020    BILIDIR <0.2 09/07/2019    LABALBU 4.0 12/25/2020    LABALBU 4.4 10/27/2011     Magnesium:    Lab Results   Component Value Date    MG 1.8 01/09/2019     Warfarin PT/INR:  No components found for: PTPATWAR, PTINRWAR  HgBA1c:    Lab Results   Component Value Date    LABA1C 6.7 08/13/2020    LABA1C 6.5 01/08/2019     FLP:    Lab Results   Component Value Date    TRIG 117 05/25/2018    HDL 44 05/25/2018    LDLCALC 69 05/25/2018     TSH:    Lab Results   Component Value Date    TSH 0.620 08/23/2018      She had a cardiac catheterization in 2009 and normal LV function and no significant  obstructive lesion. Widely patent and dominant RCA; diagonal large  caliber, widely patent; LAD, widely patent; large vessel and left  circumflex, widely patent. So basically, all the arteries are widely  patent in the cardiac catheterization then. Conclusions      Summary   Lexiscan EKG stress test is not suggestive for ischemia. This Nuclear Medicine study was negative for ischemia . Signatures      ----------------------------------------------------------------   Electronically signed by Rachel Brandt MD (Interpreting   Cardiologist) on 01/09/2019 at 15:29   ----------------------------------------------------------------       Conclusions      Summary   Normal left ventricle size and Low Normal LV systolic function. Ejection   fraction was estimated at 50 %. There were no regional left ventricular   wall motion abnormalities and wall thickness was within normal limits. The left atrium is Mildly dilated. Signature      ----------------------------------------------------------------   Electronically signed by Rachel Brandt MD (Interpreting   physician) on 01/09/2019 at 09:23 PM   ----------------------------------------------------------------    Stable chest x-ray no interval change since previous study dated 24 December 2020.   2. Mild cardiomegaly with possible left atrial enlargement   3. Otherwise negative chest x-ray. .                   This report has been created using voice recognition software. It may contain minor errors which are inherent in voice recognition technology.       Final report electronically signed by DR Viktoriya Rodríguez on 12/25/2020 9:57 AM     ekg dec 2020  Normal sinus rhythm  Normal ECG  When compared with ECG of 24-DEC-2020 20:00,  No significant change was found    Assessment  Hx of sinus tachy   Diagnosis Orders   1. Essential hypertension  ECHO Complete 2D W Doppler W Color   2. SOB (shortness of breath) on exertion  ECHO Complete 2D W Doppler W Color   3. Pure hypercholesterolemia  ECHO Complete 2D W Doppler W Color           Recent admission DX  ASSESSMENT:  1. Atypical chest pain, appears to be more musculoskeletal with chest  wall tenderness. 2.  COPD. 3.  Hypertension. 4.  Hyperlipidemia.   5.  History of diabetes.       Plan     The most current meds and labs reviewed    Continue the current treatment and with constant vigilance to changes in symptoms and also any potential side effects. Return for care or seek medical attention immediately if symptoms got worse and/or develop new symptoms. Hypertension, on medical treatment. Seems to be under good control. Patient is compliant with medical treatment. Hx of Sinus tachycardia  Cont toprol xl 50     Sob on exertion left shoulder pain and tenderness  cxr possible cardiomegaly  Echo  [facility]uc [facility]    D/w the pat the plan of care   Hyperlipidemia: on statins, followed periodically. Patient need periodic lipid and liver profile. Smoking: discussed with the patient the importance of smoke cessation especially with the risk of CAD.     Lipid panel and liver function test before next appointment    Doing well and stable    D/w the pat the plan of care         I spent 32 minutes involved in face-to-face discussion of medical issues, prognosis, record review  and plan with the patient today and more than 50% of the time was spent on counseling and coordination of care      RTC in  1 year      Kevin Nicole

## 2021-02-22 ENCOUNTER — TELEPHONE (OUTPATIENT)
Dept: CARDIOLOGY CLINIC | Age: 61
End: 2021-02-22

## 2021-02-22 NOTE — TELEPHONE ENCOUNTER
Spoke to patient Count includes the [facility] for DENICE screening 3/15/21 at 11:30 am.

## 2021-03-01 ENCOUNTER — HOSPITAL ENCOUNTER (OUTPATIENT)
Dept: NON INVASIVE DIAGNOSTICS | Age: 61
Discharge: HOME | End: 2021-03-01
Payer: MEDICARE

## 2021-03-01 DIAGNOSIS — R06.02 SOB (SHORTNESS OF BREATH) ON EXERTION: ICD-10-CM

## 2021-03-01 DIAGNOSIS — I10 ESSENTIAL HYPERTENSION: ICD-10-CM

## 2021-03-01 DIAGNOSIS — E78.00 PURE HYPERCHOLESTEROLEMIA: ICD-10-CM

## 2021-03-01 LAB
LV EF: 63 %
LVEF MODALITY: NORMAL

## 2021-03-01 PROCEDURE — 93306 TTE W/DOPPLER COMPLETE: CPT

## 2021-03-24 ENCOUNTER — HOSPITAL ENCOUNTER (OUTPATIENT)
Dept: PHYSICAL THERAPY | Age: 61
Setting detail: THERAPIES SERIES
Discharge: HOME | End: 2021-03-24
Payer: MEDICARE

## 2021-03-24 PROCEDURE — 97162 PT EVAL MOD COMPLEX 30 MIN: CPT

## 2021-03-24 PROCEDURE — 97110 THERAPEUTIC EXERCISES: CPT

## 2021-03-24 NOTE — FLOWSHEET NOTE
** PLEASE SIGN, DATE AND TIME CERTIFICATION BELOW AND RETURN TO [facility] OUTPATIENT REHABILITATION (FAX #: [phone]). ATTEST/CO-SIGN IF ACCESSING VIA [employer]. THANK YOU.**    I certify that I have examined the patient below and determined that Physical Medicine and Rehabilitation service is necessary and that I approve the established plan of care for up to 90 days or as specifically noted. Attestation, signature or co-signature of physician indicates approval of certification requirements.    ________________________ ____________ __________  Physician Signature   Date   Time  [address] [facility]  PHYSICAL THERAPY  [x] EVALUATION  [] DAILY NOTE (LAND) [] DAILY NOTE (AQUATIC ) [] PROGRESS NOTE [] DISCHARGE NOTE    [x] [address] [facility]   [] [place] [address]    [] [facility]   [] Doc Has    Date: 3/24/2021  Patient Name:  Graeme Bedoya  : 1960  MRN: [id number]  CSN: [id number]    Referring Practitioner LUMA Mayfield -*   Diagnosis BACK PAIN     Treatment Diagnosis L shoulder pain, weakness in L UE   Date of Evaluation 3/24/21    Additional Pertinent History Fibromyalgia, COPD, arthritis, HTN, asthma, diabetes      Functional Outcome Measure Used UEFI   Functional Outcome Score 29/80 (3/24/21)       Insurance: Primary: Payor: Lorrie Garcia /  /  / ,   Secondary: MEDICAID [place]   Authorization Information: Aquatics covered, modalities covered, massage covered, telehealth covered   Visit # 1, 1/10 for progress note   Visits Allowed: Unlimited based on medical necessity   Recertification Date: 26   Physician Follow-Up: As needed   Physician Orders:    History of Present Illness: Patient reports that her L shoulder began to hurt in January with incisidious onset. Patient has had a L rotator cuff repair in  on L shoulder. Patient also had a R rotator cuff repair in .  Patient did have an MRI showing mild subacromial and n/a   Special Tests Charl Sosa + L, Speeds + L, Neers + L, empty can + L, drop arm test - L, Grind test - L, posterior apprehension test - L, biceps load - L         TREATMENT   Precautions: History of B rotator cuff repair surgery   Pain: 6-7/10 in L shoulder    X in shaded column indicates activity completed today   Modalities Parameters/  Location  Notes                     Manual Therapy Time/Technique  Notes                     Exercise/Intervention   Notes   Upper trap stretch   X Patient educated on and instructed in HEP with handout provided. Pec corner stretch   X \"   Shoulder AAROM wand flexion, abduction, ER   X \"   Shoulder AAROM towel slides flexion and abduction   X \"                                                      Specific Interventions Next Treatment: L shoulder AROM/AAROM/PROM, L shoulder/elbow strengthening, scapular strengthening, scapular retractions, pec/upper trap/biceps stretching, modalities as needed. Activity/Treatment Tolerance:  [x]  Patient tolerated treatment well  []  Patient limited by fatigue  []  Patient limited by pain   []  Patient limited by medical complications  []  Other:     Assessment: 57year old female presents with L shoulder pain. Probable L shoulder impingement/bursitis. Patient has pain in L shoulder, decreased shoulder ROM, decreased shoulder strength, decreased elbow strength, forward head and rounded shoulder posture, and tightness in upper trap/pecs/biceps limiting the patients ability to perform daily tasks that involve reaching and lifting. Patient would benefit from skilled PT to improve pain, ROM, tissue extensibility, strength, and posture to allow improved functional mobility. Patient educated on benefit of PT and PT POC. Patient verbalizes agreement with PT POC.      Body Structures/Functions/Activity Limitations: impaired ROM, impaired strength and pain  Prognosis: good    GOALS:  Patient Goal: to decrease pain in L shoulder    Short Term Goals: Time Frame: 6 weeks  1. Patient will report decrease in pain to 4/10 at most to allow ease of ADLs and household tasks. 2. Patient will improve L shoulder flexion AROM to 170 degrees to allow decreased pain with reaching overhead. 3. Patient will improve L shoulder abduction AROM to 170 degrees to allow decreased pain with household tasks. 4. Patient will improve L UE strength to 4+/5 to allow ease of lifting household items. Long Term Goals:  Time Frame: 12 weeks  1. Patient will improve Upper Extremity Functional Index Score to 50/80 to allow decreased disability and improved functional mobility. 2. Patient will be independent with HEP in order to prevent re-injury and improve functional abilities. Patient Education:   [x]  HEP/Education Completed: Plan of Care, Goals, benefit of PT, HEP with handout provided   [employer] Access Code:  []  No new Education completed  []  Reviewed Prior HEP      [x]  Patient verbalized and/or demonstrated understanding of education provided. []  Patient unable to verbalize and/or demonstrate understanding of education provided. Will continue education. []  Barriers to learning:     PLAN:  Treatment Recommendations: Strengthening, Range of Motion, Manual Therapy - Soft Tissue Mobilization, Manual Therapy - Joint Manipulation, Pain Management, Home Exercise Program, Patient Education and Modalities    [x]  Plan of care initiated. Plan to see patient 2 times per week for 12 weeks to address the treatment planned outlined above.   []  Continue with current plan of care  []  Modify plan of care as follows:    []  Hold pending physician visit  []  Discharge    Time In 1122   Time Out 1201   Timed Code Minutes: 8 min   Total Treatment Time: 39 min       Electronically Signed by: Melvin Boyd

## 2021-03-29 ENCOUNTER — HOSPITAL ENCOUNTER (OUTPATIENT)
Dept: PHYSICAL THERAPY | Age: 61
Setting detail: THERAPIES SERIES
Discharge: HOME | End: 2021-03-29
Payer: MEDICARE

## 2021-03-29 PROCEDURE — 97110 THERAPEUTIC EXERCISES: CPT

## 2021-03-29 NOTE — PROGRESS NOTES
4/10 pain after cold pack. Manual Therapy Time/Technique  Notes   PROM L shoulder flexion and abduction 5 minutes x Patient requiring oscillations to relax and frequent cues to relax. Patient does have empty end feels with flexion and abduction today with pain and end range. Exercise/Intervention   Notes   Upper trap stretch B 3 x 30\"  X Cues to relax shoulders down   Pec corner stretch 3 x 30\"  X    Shoulder AAROM wand flexion, abduction, ER L 10 x 2\"  X    Shoulder AAROM towel slides flexion and abduction L 10 x 2\"  X    Scapular retraction 15 x 5\"  X    Backward shoulder rolls 15 x  X    Biceps stretch at wall L 3 x 30\"  X    Bicep curls 3 ways  15 x each  X    AROM shoulder flexion, scaption, abduction 10 x each  X Cues for thumb up position with all to increase space in subacrominal area and decrease pain                   Specific Interventions Next Treatment: L shoulder AROM/AAROM/PROM, L shoulder/elbow strengthening, scapular strengthening, scapular retractions, pec/upper trap/biceps stretching, modalities as needed. Activity/Treatment Tolerance:  [x]  Patient tolerated treatment well  []  Patient limited by fatigue  []  Patient limited by pain   []  Patient limited by medical complications  []  Other:     Assessment: Patient was initiated on L shoulder and elbow stretching and strengthening exercises to address deficits. Patient does report a muscle fatigue and soreness throughout and after the session. Patient reports only a mild increase in pain with exercises today. Patient was given a cold pack to decrease pain and post exercise soreness. Patient reports 4/10 pain after cold pack. Patient would benefit from continued skilled PT to improve pain, ROM, tissue extensibility, strength to allow improved reaching and lifting to ease daily tasks. GOALS:  Patient Goal: to decrease pain in L shoulder    Short Term Goals:  Time Frame: 6 weeks  1.  Patient will report decrease in pain to 4/10 at most to allow ease of ADLs and household tasks. 2. Patient will improve L shoulder flexion AROM to 170 degrees to allow decreased pain with reaching overhead. 3. Patient will improve L shoulder abduction AROM to 170 degrees to allow decreased pain with household tasks. 4. Patient will improve L UE strength to 4+/5 to allow ease of lifting household items. Long Term Goals:  Time Frame: 12 weeks  1. Patient will improve Upper Extremity Functional Index Score to 50/80 to allow decreased disability and improved functional mobility. 2. Patient will be independent with HEP in order to prevent re-injury and improve functional abilities. Patient Education:   [x]  HEP/Education Completed: correct exercise technique, thumb up position for shoulder flexion and abduction, icing at home to decrease pain and post exercise soreness and parameters with ice.  [employer] Access Code:  []  No new Education completed  [x]  Reviewed Prior HEP      [x]  Patient verbalized and/or demonstrated understanding of education provided. []  Patient unable to verbalize and/or demonstrate understanding of education provided. Will continue education. []  Barriers to learning:     PLAN:  Treatment Recommendations: Strengthening, Range of Motion, Manual Therapy - Soft Tissue Mobilization, Manual Therapy - Joint Manipulation, Pain Management, Home Exercise Program, Patient Education and Modalities    []  Plan of care initiated. Plan to see patient 2 times per week for 12 weeks to address the treatment planned outlined above.   [x]  Continue with current plan of care  []  Modify plan of care as follows:    []  Hold pending physician visit  []  Discharge    Time In 1049   Time Out 1131   Timed Code Minutes: 32 min   Total Treatment Time: 42 min       Electronically Signed by: Prosper Calderon

## 2021-03-31 ENCOUNTER — HOSPITAL ENCOUNTER (OUTPATIENT)
Dept: PHYSICAL THERAPY | Age: 61
Setting detail: THERAPIES SERIES
Discharge: HOME | End: 2021-03-31
Payer: MEDICARE

## 2021-03-31 PROCEDURE — 97110 THERAPEUTIC EXERCISES: CPT

## 2021-03-31 PROCEDURE — 97035 APP MDLTY 1+ULTRASOUND EA 15: CPT

## 2021-03-31 NOTE — PROGRESS NOTES
[address] [facility]  PHYSICAL THERAPY  [] EVALUATION  [x] DAILY NOTE (LAND) [] DAILY NOTE (AQUATIC ) [] PROGRESS NOTE [] DISCHARGE NOTE    [x] [facility] [place]   [] [place] [address]    [] [facility]   [] Shelby Joeer    Date: 3/31/2021  Patient Name:  Aldo Burnette  : 1960  MRN: [id number]  CSN: [id number]    Referring Practitioner LUMA Hurst -*   Diagnosis BACK PAIN     Treatment Diagnosis L shoulder pain, weakness in L UE   Date of Evaluation 3/24/21    Additional Pertinent History Fibromyalgia, COPD, arthritis, HTN, asthma, diabetes      Functional Outcome Measure Used UEFI   Functional Outcome Score 29/80 (3/24/21)       Insurance: Primary: Payor: Padmini Mae /  /  / ,   Secondary: MEDICAID [place]   Authorization Information: Aquatics covered, modalities covered, massage covered, telehealth covered   Visit # 3, 3/10 for progress note   Visits Allowed: Unlimited based on medical necessity   Recertification Date:    Physician Follow-Up: As needed   Physician Orders:    History of Present Illness: Patient reports that her L shoulder began to hurt in January with incisidious onset. Patient has had a L rotator cuff repair in  on L shoulder. Patient also had a R rotator cuff repair in . Patient did have an MRI showing mild subacromial and subdeltoid bursitis in L shoulder with no tear or fracture noted. SUBJECTIVE: Patient reports she started having elbow pain yesterday but isn't sure what caused that and hasn't felt yet today. States she is usually sore after the exercises but is using ice at home. TREATMENT   Precautions: History of B rotator cuff repair surgery   Pain: 5/10 in L shoulder    X in shaded column indicates activity completed today   Modalities Parameters/  Location  Notes   Cold pack to L shoulder 10 minutes  Patient skin intact pre and post treatment.  Patient reports decreased pain to 4/10 pain after cold pack. Ultrasound to Left shoulder  3.3 MHz, 50% pulsed, . 8w/cm2, 8 minutes X No adverse response to treatment. Skin intact pre and post treatment. Manual Therapy Time/Technique  Notes   PROM Left shoulder flexion and abduction 5 minutes X Patient requiring oscillations to relax and frequent cues to relax. Patient does have empty end feels with flexion and abduction today with pain and end range. Exercise/Intervention   Notes   Upper trap stretch Bilateral 3x  30 seconds X Cues to relax shoulders down   Pec corner stretch 3x 30 seconds X    Shoulder AAROM wand flexion, abduction, ER  10x Left 2 seconds X Seated in chair; Only 6 reps with flexion due to pain   Shoulder AAROM towel slides flexion and abduction  10x Left 2 seconds X Slides on table   Scapular retraction 15x  5 seconds X    Backward shoulder rolls 15x  X    Biceps stretch at wall  3x Left 30 seconds X    Bicep curls 3 ways  15x each  X    AROM shoulder flexion, scaption, abduction 10x each  X Cues for thumb up position with all to increase space in subacrominal area and decrease pain                   Specific Interventions Next Treatment: L shoulder AROM/AAROM/PROM, L shoulder/elbow strengthening, scapular strengthening, scapular retractions, pec/upper trap/biceps stretching, modalities as needed. Activity/Treatment Tolerance:  [x]  Patient tolerated treatment well  []  Patient limited by fatigue  []  Patient limited by pain   []  Patient limited by medical complications  []  Other:     Assessment:  Continued stretching and strengthening exercises to improve range of motion and weakness. Muscle fatigue and increased pain noted with exercises today. Trial of ultrasound today with patient having decreased pain to 3/10 at conclusion of therapy session. Encouraged patient to use ice at home for pain. GOALS:  Patient Goal: to decrease pain in L shoulder    Short Term Goals:  Time Frame: 6 weeks  1.  Patient will report decrease in pain to 4/10 at most to allow ease of ADLs and household tasks. 2. Patient will improve L shoulder flexion AROM to 170 degrees to allow decreased pain with reaching overhead. 3. Patient will improve L shoulder abduction AROM to 170 degrees to allow decreased pain with household tasks. 4. Patient will improve L UE strength to 4+/5 to allow ease of lifting household items. Long Term Goals:  Time Frame: 12 weeks  1. Patient will improve Upper Extremity Functional Index Score to 50/80 to allow decreased disability and improved functional mobility. 2. Patient will be independent with HEP in order to prevent re-injury and improve functional abilities. Patient Education:   [x]  HEP/Education Completed: Continue with stretches and strengthening at home. Ice to decrease pain. Purpose of ultrasound and to monitor pain after.  [employer] Access Code:  []  No new Education completed  [x]  Reviewed Prior HEP      [x]  Patient verbalized and/or demonstrated understanding of education provided. []  Patient unable to verbalize and/or demonstrate understanding of education provided. Will continue education. []  Barriers to learning:     PLAN:  Treatment Recommendations: Strengthening, Range of Motion, Manual Therapy - Soft Tissue Mobilization, Manual Therapy - Joint Manipulation, Pain Management, Home Exercise Program, Patient Education and Modalities    []  Plan of care initiated. Plan to see patient 2 times per week for 12 weeks to address the treatment planned outlined above.   [x]  Continue with current plan of care  []  Modify plan of care as follows:    []  Hold pending physician visit  []  Discharge    Time In 1030   Time Out 1115   Timed Code Minutes: 45 min   Total Treatment Time: 45 min       Electronically Signed by: Jamal Gutierrez

## 2021-04-05 ENCOUNTER — HOSPITAL ENCOUNTER (OUTPATIENT)
Dept: PHYSICAL THERAPY | Age: 61
Setting detail: THERAPIES SERIES
Discharge: HOME | End: 2021-04-05
Payer: MEDICARE

## 2021-04-05 PROCEDURE — 97110 THERAPEUTIC EXERCISES: CPT

## 2021-04-05 PROCEDURE — 97035 APP MDLTY 1+ULTRASOUND EA 15: CPT

## 2021-04-05 NOTE — PROGRESS NOTES
[address] [facility]  PHYSICAL THERAPY  [] EVALUATION  [x] DAILY NOTE (LAND) [] DAILY NOTE (AQUATIC ) [] PROGRESS NOTE [] DISCHARGE NOTE    [x] OUTPATIENT REHABILITATION CENTER [facility]   [] [place] [address]    [] [facility]   [] Law Ramirez    Date: 2021  Patient Name:  Zeus Sevilla  : 1960  MRN: [id number]  CSN: [id number]    Referring Practitioner LUMA Henley -*   Diagnosis BACK PAIN     Treatment Diagnosis L shoulder pain, weakness in L UE   Date of Evaluation 3/24/21    Additional Pertinent History Fibromyalgia, COPD, arthritis, HTN, asthma, diabetes      Functional Outcome Measure Used UEFI   Functional Outcome Score 29/80 (3/24/21)       Insurance: Primary: Payor: Estrella Quintana /  /  / ,   Secondary: MEDICAID [place]   Authorization Information: Aquatics covered, modalities covered, massage covered, telehealth covered   Visit # 4, 4/10 for progress note   Visits Allowed: Unlimited based on medical necessity   Recertification Date:    Physician Follow-Up: As needed   Physician Orders:    History of Present Illness: Patient reports that her L shoulder began to hurt in January with incisidious onset. Patient has had a L rotator cuff repair in  on L shoulder. Patient also had a R rotator cuff repair in . Patient did have an MRI showing mild subacromial and subdeltoid bursitis in L shoulder with no tear or fracture noted. SUBJECTIVE: Patient reports that she did have a decrease in pain with the ultrasound after last treatment. Patient reports that her pain was lower for about 3 hours after treatment.  Patient presents with 5/10 pain in L shoulder today upon arrival.       TREATMENT   Precautions: History of B rotator cuff repair surgery   Pain: 5/10 in L shoulder    X in shaded column indicates activity completed today   Modalities Parameters/  Location  Notes   Cold pack to L shoulder 10 minutes  Patient skin intact pre and post treatment. Patient reports decreased pain to 4/10 pain after cold pack. Ultrasound to Left shoulder  3.3 MHz, 50% pulsed, . 8w/cm2, 8 minutes X No adverse response to treatment. Skin intact pre and post treatment. Decrease in pain to 3/10. Manual Therapy Time/Technique  Notes   PROM Left shoulder flexion and abduction 5 minutes X Patient requiring oscillations to relax and frequent cues to relax. Patient does have empty end feels with flexion and abduction today with pain and end range. Exercise/Intervention   Notes   Upper trap stretch Bilateral 3x  30 seconds X Cues to relax shoulders down   Pec corner stretch 3x 30 seconds X    Shoulder AAROM wand flexion, abduction, ER  12x Left 2 seconds X Seated in chair; Only 6 reps with flexion due to pain   Shoulder AAROM towel slides flexion and abduction  12x Left 2 seconds X Slides on table   Scapular retraction 15x  5 seconds X    Backward shoulder rolls 15x  X    Biceps stretch at wall  3x Left 30 seconds X    Bicep curls 3 ways  15x each  X    AROM shoulder flexion, scaption, abduction 10x each  X Cues for thumb up position with all to increase space in subacrominal area and decrease pain   Theraband rows and extension 10 x each orange X Added today            Specific Interventions Next Treatment: L shoulder AROM/AAROM/PROM, L shoulder/elbow strengthening, scapular strengthening, scapular retractions, pec/upper trap/biceps stretching, modalities as needed. Activity/Treatment Tolerance:  [x]  Patient tolerated treatment well  []  Patient limited by fatigue  []  Patient limited by pain   []  Patient limited by medical complications  []  Other:     Assessment:  Patient has improved L shoulder flexion AROM this date during AROM exercise to ~150 degrees. Patient was progressed with increased repetitions this date to further improve ROM and strength. Patient does have an increase in pain with exercises.  Patient does have a difficult time relaxing and requires several cues to relax during PROM. Patient has pain at end range of motion in all directions today. Patient does report a decrease in pain to 3/10 after ultrasound. Plan to continue to progress patient to further improve pain, ROM, strength and tissue extensibility to allow improved functional mobility. GOALS:  Patient Goal: to decrease pain in L shoulder    Short Term Goals:  Time Frame: 6 weeks  1. Patient will report decrease in pain to 4/10 at most to allow ease of ADLs and household tasks. 2. Patient will improve L shoulder flexion AROM to 170 degrees to allow decreased pain with reaching overhead. 3. Patient will improve L shoulder abduction AROM to 170 degrees to allow decreased pain with household tasks. 4. Patient will improve L UE strength to 4+/5 to allow ease of lifting household items. Long Term Goals:  Time Frame: 12 weeks  1. Patient will improve Upper Extremity Functional Index Score to 50/80 to allow decreased disability and improved functional mobility. 2. Patient will be independent with HEP in order to prevent re-injury and improve functional abilities. Patient Education:   [x]  HEP/Education Completed: Correct exercise technique, monitoring response to ultrasound    [employer] Access Code:  []  No new Education completed  []  Reviewed Prior HEP      [x]  Patient verbalized and/or demonstrated understanding of education provided. []  Patient unable to verbalize and/or demonstrate understanding of education provided. Will continue education. []  Barriers to learning:     PLAN:  Treatment Recommendations: Strengthening, Range of Motion, Manual Therapy - Soft Tissue Mobilization, Manual Therapy - Joint Manipulation, Pain Management, Home Exercise Program, Patient Education and Modalities    []  Plan of care initiated. Plan to see patient 2 times per week for 12 weeks to address the treatment planned outlined above.   [x]  Continue with current plan of care  []  Modify plan of care as follows:    []  Hold pending physician visit  []  Discharge    Time In 1031   Time Out 1113   Timed Code Minutes: 42 min   Total Treatment Time: 42 min       Electronically Signed by: Callie Keating

## 2021-04-07 ENCOUNTER — APPOINTMENT (OUTPATIENT)
Dept: PHYSICAL THERAPY | Age: 61
End: 2021-04-07
Payer: MEDICARE

## 2021-04-12 ENCOUNTER — HOSPITAL ENCOUNTER (OUTPATIENT)
Dept: PHYSICAL THERAPY | Age: 61
Setting detail: THERAPIES SERIES
Discharge: HOME | End: 2021-04-12
Payer: MEDICARE

## 2021-04-12 PROCEDURE — 97035 APP MDLTY 1+ULTRASOUND EA 15: CPT

## 2021-04-12 PROCEDURE — 97110 THERAPEUTIC EXERCISES: CPT

## 2021-04-12 NOTE — PROGRESS NOTES
[address] [facility]  PHYSICAL THERAPY  [] EVALUATION  [x] DAILY NOTE (LAND) [] DAILY NOTE (AQUATIC ) [] PROGRESS NOTE [] DISCHARGE NOTE    [x] [facility] [place]   [] [place] [address]    [] [facility]   [] Sina Callejas    Date: 2021  Patient Name:  Skyla Clifford  : 1960  MRN: [id number]  CSN: [id number]    Referring Practitioner LUMA Goldberg -*   Diagnosis BACK PAIN     Treatment Diagnosis L shoulder pain, weakness in L UE   Date of Evaluation 3/24/21    Additional Pertinent History Fibromyalgia, COPD, arthritis, HTN, asthma, diabetes      Functional Outcome Measure Used UEFI   Functional Outcome Score 29/80 (3/24/21)       Insurance: Primary: Payor: Susan Meyers /  /  / ,   Secondary: MEDICAID [place]   Authorization Information: Aquatics covered, modalities covered, massage covered, telehealth covered   Visit # 5, 5/10 for progress note   Visits Allowed: Unlimited based on medical necessity   Recertification Date: 3/69/14   Physician Follow-Up: As needed   Physician Orders:    History of Present Illness: Patient reports that her L shoulder began to hurt in January with incisidious onset. Patient has had a L rotator cuff repair in  on L shoulder. Patient also had a R rotator cuff repair in . Patient did have an MRI showing mild subacromial and subdeltoid bursitis in L shoulder with no tear or fracture noted. SUBJECTIVE: Patient reports she is still getting relief for a few hours after ultrasound treatment. States she is doing exercises at home as well. TREATMENT   Precautions: History of B rotator cuff repair surgery   Pain: 5/10 in L shoulder    X in shaded column indicates activity completed today   Modalities Parameters/  Location  Notes   Cold pack to L shoulder 10 minutes  Patient skin intact pre and post treatment. Patient reports decreased pain to 4/10 pain after cold pack.    Ultrasound to Left shoulder  3.3 MHz, 50% pulsed, . 8w/cm2, 8 minutes X No adverse response to treatment. Skin intact pre and post treatment. Decrease in pain to 3/10. Manual Therapy Time/Technique  Notes   PROM Left shoulder flexion and abduction 5 minutes  Patient requiring oscillations to relax and frequent cues to relax. Patient does have empty end feels with flexion and abduction today with pain and end range. Exercise/Intervention   Notes   Pulleys for AAROM for shoulder flexion and scaption 10-15x each  X    Upper trap and levator stretches Bilateral 3x  30 seconds X Cues to relax shoulders down   Pec stretch in doorway 3x 30 seconds X    Shoulder AAROM wand flexion, abduction, ER  15x Left 2 seconds X Seated in chair   Shoulder AAROM towel slides flexion and abduction  15x Left 2 seconds X Slides on table   Scapular retraction 15x  5 seconds X    Backward shoulder rolls 15x  X    Biceps stretch at wall  3x Left 30 seconds X    Bicep curls 3 ways holding 1# weights 15x each  X    AROM shoulder flexion, scaption, abduction 10x each  X Cues for thumb up position with all to increase space in subacrominal area and decrease pain   Theraband rows and extension 15x each orange X             Specific Interventions Next Treatment: L shoulder AROM/AAROM/PROM, L shoulder/elbow strengthening, scapular strengthening, scapular retractions, pec/upper trap/biceps stretching, modalities as needed. Activity/Treatment Tolerance:  [x]  Patient tolerated treatment well  []  Patient limited by fatigue  []  Patient limited by pain   []  Patient limited by medical complications  []  Other:     Assessment:  Added pulleys, 1# hand weights for biceps and increased repetitions with orange band. Patient tolerated session well. Continues to have a difficult time with relaxing upper traps but is able to do with cueing. Patient felt good at end of session with decreased pain noted after ultrasound.        GOALS:  Patient Goal: to decrease pain in L shoulder    Short Term Goals:  Time Frame: 6 weeks  1. Patient will report decrease in pain to 4/10 at most to allow ease of ADLs and household tasks. 2. Patient will improve L shoulder flexion AROM to 170 degrees to allow decreased pain with reaching overhead. 3. Patient will improve L shoulder abduction AROM to 170 degrees to allow decreased pain with household tasks. 4. Patient will improve L UE strength to 4+/5 to allow ease of lifting household items. Long Term Goals:  Time Frame: 12 weeks  1. Patient will improve Upper Extremity Functional Index Score to 50/80 to allow decreased disability and improved functional mobility. 2. Patient will be independent with HEP in order to prevent re-injury and improve functional abilities. Patient Education:   [x]  HEP/Education Completed: Correct exercise technique, monitoring response to ultrasound    [employer] Access Code:  []  No new Education completed  [x]  Reviewed Prior HEP      []  Patient verbalized and/or demonstrated understanding of education provided. []  Patient unable to verbalize and/or demonstrate understanding of education provided. Will continue education. []  Barriers to learning:     PLAN:  Treatment Recommendations: Strengthening, Range of Motion, Manual Therapy - Soft Tissue Mobilization, Manual Therapy - Joint Manipulation, Pain Management, Home Exercise Program, Patient Education and Modalities    []  Plan of care initiated. Plan to see patient 2 times per week for 12 weeks to address the treatment planned outlined above.   [x]  Continue with current plan of care  []  Modify plan of care as follows:    []  Hold pending physician visit  []  Discharge    Time In 1002   Time Out 1045   Timed Code Minutes: 43 min   Total Treatment Time: 43 min       Electronically Signed by: Gayle Abbott

## 2021-04-14 ENCOUNTER — HOSPITAL ENCOUNTER (OUTPATIENT)
Dept: PHYSICAL THERAPY | Age: 61
Setting detail: THERAPIES SERIES
Discharge: HOME | End: 2021-04-14
Payer: MEDICARE

## 2021-04-14 PROCEDURE — 97110 THERAPEUTIC EXERCISES: CPT

## 2021-04-14 PROCEDURE — 97035 APP MDLTY 1+ULTRASOUND EA 15: CPT

## 2021-04-14 NOTE — PROGRESS NOTES
[address] [facility]  PHYSICAL THERAPY  [] EVALUATION  [x] DAILY NOTE (LAND) [] DAILY NOTE (AQUATIC ) [] PROGRESS NOTE [] DISCHARGE NOTE    [x] [facility] [place]   [] [place] [address]    [] [facility]   [] Milana Primrose    Date: 2021  Patient Name:  Caprice Smyth  : 1960  MRN: [id number]  CSN: [id number]    Referring Practitioner Jennings Peabody, APRN -*   Diagnosis BACK PAIN     Treatment Diagnosis L shoulder pain, weakness in L UE   Date of Evaluation 3/24/21    Additional Pertinent History Fibromyalgia, COPD, arthritis, HTN, asthma, diabetes      Functional Outcome Measure Used UEFI   Functional Outcome Score 29/80 (3/24/21)       Insurance: Primary: Payor: Sara Jean /  /  / ,   Secondary: MEDICAID [place]   Authorization Information: Aquatics covered, modalities covered, massage covered, telehealth covered   Visit # 6, 6/10 for progress note   Visits Allowed: Unlimited based on medical necessity   Recertification Date:    Physician Follow-Up: As needed   Physician Orders:    History of Present Illness: Patient reports that her L shoulder began to hurt in January with incisidious onset. Patient has had a L rotator cuff repair in  on L shoulder. Patient also had a R rotator cuff repair in . Patient did have an MRI showing mild subacromial and subdeltoid bursitis in L shoulder with no tear or fracture noted. SUBJECTIVE:  Patient notes she is having more pain lately and is unsure. Patient feels the type of pain has changed and frequently points to the elbow. Patient notes pain is more intense with moving, lifting, or carrying something. TREATMENT   Precautions: History of B rotator cuff repair surgery   Pain: 8/10 in L shoulder    X in shaded column indicates activity completed today   Modalities Parameters/  Location  Notes   Cold pack to L shoulder 10 minutes  Patient skin intact pre and post treatment.  Patient reports decreased pain to 4/10 pain after cold pack. Ultrasound to Left shoulder  3.3 MHz, 50% pulsed, . 8w/cm2, 8 minutes X No adverse response to treatment. Skin intact pre and post treatment. Decrease in pain to 6/10. Manual Therapy Time/Technique  Notes   PROM Left shoulder flexion and abduction 5 minutes  Patient requiring oscillations to relax and frequent cues to relax. Patient does have empty end feels with flexion and abduction today with pain and end range. Exercise/Intervention   Notes   Pulleys for AAROM for shoulder flexion and scaption 20x each  X    Upper trap and levator stretches Bilateral 2x  30 seconds X Cues to relax shoulders down   Pec stretch in doorway 2x 30 seconds X    Shoulder AAROM wand flexion, abduction, ER in field goal position  10x Left 2 seconds X Supine due to elevated pain today, re-initiate seated as appropriate    Shoulder AAROM towel slides flexion and abduction  15x Left 2 seconds  Slides on table   Scapular retraction 15x  5 seconds X    Backward shoulder rolls 15x  X    Biceps stretch at wall  3x Left 30 seconds X    Bicep curls 3 ways holding 1# weights 15x each  X 12x for palms neutral and held on palms down due to pain   Supine serratus punches, supine shoulder circles and ABC's  x10 ew  x1   X    Supine chest press with cane  x10   X Add ankle weight next visit           AROM shoulder flexion, scaption, abduction 10x each   Cues for thumb up position with all to increase space in subacrominal area and decrease pain   Theraband rows and extension 15x each orange              Specific Interventions Next Treatment: L shoulder AROM/AAROM/PROM, L shoulder/elbow strengthening, scapular strengthening, scapular retractions, pec/upper trap/biceps stretching, modalities as needed.      Activity/Treatment Tolerance:  [x]  Patient tolerated treatment well  []  Patient limited by fatigue  []  Patient limited by pain   []  Patient limited by medical complications  []  Other:     Assessment:  Patient reports increased pain when isolating brachialis muscle so this was held. Patient was able to progress supine scapular stability program and reports decreased pain at conclusion of treatment session 6/10. GOALS:  Patient Goal: to decrease pain in L shoulder    Short Term Goals:  Time Frame: 6 weeks  1. Patient will report decrease in pain to 4/10 at most to allow ease of ADLs and household tasks. 2. Patient will improve L shoulder flexion AROM to 170 degrees to allow decreased pain with reaching overhead. 3. Patient will improve L shoulder abduction AROM to 170 degrees to allow decreased pain with household tasks. 4. Patient will improve L UE strength to 4+/5 to allow ease of lifting household items. Long Term Goals:  Time Frame: 12 weeks  1. Patient will improve Upper Extremity Functional Index Score to 50/80 to allow decreased disability and improved functional mobility. 2. Patient will be independent with HEP in order to prevent re-injury and improve functional abilities. Patient Education:   [x]  HEP/Education Completed: added exercises reflective of today's progressions    [employer] Access Code:  []  No new Education completed  [x]  Reviewed Prior HEP      []  Patient verbalized and/or demonstrated understanding of education provided. []  Patient unable to verbalize and/or demonstrate understanding of education provided. Will continue education. []  Barriers to learning:     PLAN:  Treatment Recommendations: Strengthening, Range of Motion, Manual Therapy - Soft Tissue Mobilization, Manual Therapy - Joint Manipulation, Pain Management, Home Exercise Program, Patient Education and Modalities    []  Plan of care initiated. Plan to see patient 2 times per week for 12 weeks to address the treatment planned outlined above.   [x]  Continue with current plan of care  []  Modify plan of care as follows:    []  Hold pending physician visit  [] Discharge    Time In 0942   Time Out 1030   Timed Code Minutes: 48 min   Total Treatment Time: 48 min       Electronically Signed by: Laith Gonzalez

## 2021-04-16 ENCOUNTER — TELEPHONE (OUTPATIENT)
Dept: CARDIOLOGY CLINIC | Age: 61
End: 2021-04-16

## 2021-04-19 ENCOUNTER — HOSPITAL ENCOUNTER (OUTPATIENT)
Dept: PHYSICAL THERAPY | Age: 61
Setting detail: THERAPIES SERIES
Discharge: HOME | End: 2021-04-19
Payer: MEDICARE

## 2021-04-19 PROCEDURE — 97110 THERAPEUTIC EXERCISES: CPT

## 2021-04-19 PROCEDURE — 97140 MANUAL THERAPY 1/> REGIONS: CPT

## 2021-04-19 NOTE — PROGRESS NOTES
fatigue  []  Patient limited by pain   []  Patient limited by medical complications  []  Other:     Assessment:  Patient noting popping in the left shoulder with various exercises, especially shoulder abduction and complaints of feeling tight. Held ultrasound today and completed manual work to Left upper arm today. Patient with moderate tightness and tenderness, especially at proximal bicep region. Patient felt a little more loose but pain was still 6/10 at end of session. Instructed patient to trial heat at home to upper arm to decrease tension and tightness. GOALS:  Patient Goal: to decrease pain in L shoulder    Short Term Goals:  Time Frame: 6 weeks  1. Patient will report decrease in pain to 4/10 at most to allow ease of ADLs and household tasks. 2. Patient will improve L shoulder flexion AROM to 170 degrees to allow decreased pain with reaching overhead. 3. Patient will improve L shoulder abduction AROM to 170 degrees to allow decreased pain with household tasks. 4. Patient will improve L UE strength to 4+/5 to allow ease of lifting household items. Long Term Goals:  Time Frame: 12 weeks  1. Patient will improve Upper Extremity Functional Index Score to 50/80 to allow decreased disability and improved functional mobility. 2. Patient will be independent with HEP in order to prevent re-injury and improve functional abilities. Patient Education:   [x]  HEP/Education Completed: Continue HEP, trial heat    [facility] Access Code:  []  No new Education completed  [x]  Reviewed Prior HEP      []  Patient verbalized and/or demonstrated understanding of education provided. []  Patient unable to verbalize and/or demonstrate understanding of education provided. Will continue education.   []  Barriers to learning:     PLAN:  Treatment Recommendations: Strengthening, Range of Motion, Manual Therapy - Soft Tissue Mobilization, Manual Therapy - Joint Manipulation, Pain Management, Home Exercise Program, Patient Education and Modalities    []  Plan of care initiated. Plan to see patient 2 times per week for 12 weeks to address the treatment planned outlined above.   [x]  Continue with current plan of care  []  Modify plan of care as follows:    []  Hold pending physician visit  []  Discharge    Time In 1045   Time Out 1130   Timed Code Minutes: 45 min   Total Treatment Time: 45 min       Electronically Signed by: Matteo Bailey

## 2021-04-21 ENCOUNTER — HOSPITAL ENCOUNTER (OUTPATIENT)
Dept: PHYSICAL THERAPY | Age: 61
Setting detail: THERAPIES SERIES
Discharge: HOME | End: 2021-04-21
Payer: MEDICARE

## 2021-04-21 PROCEDURE — 97140 MANUAL THERAPY 1/> REGIONS: CPT

## 2021-04-21 PROCEDURE — 97110 THERAPEUTIC EXERCISES: CPT

## 2021-04-21 NOTE — DISCHARGE SUMMARY
[place]  PHYSICAL THERAPY  [] EVALUATION  [] DAILY NOTE (LAND) [] DAILY NOTE (AQUATIC ) [x] PROGRESS NOTE [] DISCHARGE NOTE    [x] [facility] [place]   [] [place] [address]    [] [facility]   [] Shelbyandra Kocher    Date: 2021  Patient Name:  Aldo Burnette  : 1960  MRN: [id number]  CSN: [id number]    Referring Practitioner LUMA Hurst -*   Diagnosis BACK PAIN     Treatment Diagnosis L shoulder pain, weakness in L UE   Date of Evaluation 3/24/21    Additional Pertinent History Fibromyalgia, COPD, arthritis, HTN, asthma, diabetes      Functional Outcome Measure Used UEFI   Functional Outcome Score 29/80 (3/24/21)       Insurance: Primary: Payor: Padmini Mae /  /  / ,   Secondary: MEDICAID [place]   Authorization Information: Aqu[employer] covered, modalities covered, massage covered, telehealth covered   Visit # 8, 0/10 for progress note   Visits Allowed: Unlimited based on medical necessity   Recertification Date: 6/28/15   Physician Follow-Up: As needed   Physician Orders:    History of Present Illness: Patient reports that her L shoulder began to hurt in January with incisidious onset. Patient has had a L rotator cuff repair in  on L shoulder. Patient also had a R rotator cuff repair in . Patient did have an MRI showing mild subacromial and subdeltoid bursitis in L shoulder with no tear or fracture noted. SUBJECTIVE:  Patient reports 7/10 pain upon arrival. Patient reports some improvement with therapy but not a lot. Patient reports that she does still get high levels of pain. Patient would like today to be her last day for therapy and to return to MD for further testing.      TREATMENT   Precautions: History of B rotator cuff repair surgery   Pain: 7/10 in L shoulder down into Left bicep and elbow    X in shaded column indicates activity completed today   Modalities Parameters/  Location  Notes   Cold pack to L scapular retractions, pec/upper trap/biceps stretching, modalities as needed. Activity/Treatment Tolerance:  [x]  Patient tolerated treatment well  []  Patient limited by fatigue  []  Patient limited by pain   []  Patient limited by medical complications  []  Other:     Assessment:  Patient seen for progress report this date. Patient has been seen for 8 visits. Patient has made minimal progress with therapy. Patient has improved L shoulder AROM flexion and abduction. Patient has improved L shoulder strength since evaluation as seen below. Patient scoring lower (worse) on UEFI score today with 24/80 compared to 29/80 at evaluation. Patient does continue to have high levels of pain in L arm. Patient is I with HEP and performs daily. Patient would benefit from continuing to perform HEP to maintain gains from therapy and further improve ROM and strength of L arm. Patient was provided an updated HEP handout with patient verbalizing understanding. Patient would like to be discharged from therapy today to return to MD for further evaluation and additional testing of L arm due to continue high levels of pain in L arm. Will discharge from PT.       GOALS:  Patient Goal: to decrease pain in L shoulder    Short Term Goals:  Time Frame: 6 weeks  1. Patient will report decrease in pain to 4/10 at most to allow ease of ADLs and household tasks. GOAL NOT MET: 7/10 pain currently. Discontinue Goal    2. Patient will improve L shoulder flexion AROM to 170 degrees to allow decreased pain with reaching overhead. GOAL NOT MET: 148 degrees. Discontinue Goal    3. Patient will improve L shoulder abduction AROM to 170 degrees to allow decreased pain with household tasks. GOAL NOT MET: 107 degrees. Discontinue Goal    4. Patient will improve L UE strength to 4+/5 to allow ease of lifting household items. GOAL NOT MET: L shoulder flexion 4/5, abduction 4-/5, IR 4/5, ER 4-/5; L elbow flexion 4-/5 extension 4/5.   Discontinue Goal    Long Term Goals:  Time Frame: 12 weeks  1. Patient will improve Upper Extremity Functional Index Score to 50/80 to allow decreased disability and improved functional mobility. GOAL NOT MET: 24/80 UEFI. Discontinue Goal    2. Patient will be independent with HEP in order to prevent re-injury and improve functional abilities. GOAL MET:  Patient reports I with HEP daily. .  Discontinue Goal      Patient Education:   [x]  HEP/Education Completed: Continue HEP, continue ice or heat for pain relief, self-soft tissue mobilization to L, elbow/upper arm for decreased pain, updated HEP with handout provided.  [employer] Access Code:  []  No new Education completed  [x]  Reviewed Prior HEP      [x]  Patient verbalized and/or demonstrated understanding of education provided. []  Patient unable to verbalize and/or demonstrate understanding of education provided. Will continue education. []  Barriers to learning:     PLAN:  Treatment Recommendations: Strengthening, Range of Motion, Manual Therapy - Soft Tissue Mobilization, Manual Therapy - Joint Manipulation, Pain Management, Home Exercise Program, Patient Education and Modalities    []  Plan of care initiated. Plan to see patient 2 times per week for 12 weeks to address the treatment planned outlined above.   []  Continue with current plan of care  []  Modify plan of care as follows:    []  Hold pending physician visit  [x]  Discharge    Time In 1050   Time Out 1130   Timed Code Minutes: 40 min   Total Treatment Time: 40 min       Electronically Signed by: Elvis Linda

## 2021-05-22 ENCOUNTER — HOSPITAL ENCOUNTER (EMERGENCY)
Age: 61
Discharge: HOME | End: 2021-05-23
Attending: EMERGENCY MEDICINE
Payer: MEDICARE

## 2021-05-22 ENCOUNTER — APPOINTMENT (OUTPATIENT)
Dept: GENERAL RADIOLOGY | Age: 61
End: 2021-05-22
Payer: MEDICARE

## 2021-05-22 DIAGNOSIS — S82.851A CLOSED TRIMALLEOLAR FRACTURE OF RIGHT ANKLE, INITIAL ENCOUNTER: Primary | ICD-10-CM

## 2021-05-22 LAB
ANION GAP SERPL CALCULATED.3IONS-SCNC: 17 MEQ/L (ref 8–16)
BASOPHILS # BLD: 0.7 %
BASOPHILS ABSOLUTE: 0.1 THOU/MM3 (ref 0–0.1)
BUN BLDV-MCNC: 9 MG/DL (ref 7–22)
CALCIUM SERPL-MCNC: 9.3 MG/DL (ref 8.5–10.5)
CHLORIDE BLD-SCNC: 100 MEQ/L (ref 98–111)
CO2: 19 MEQ/L (ref 23–33)
CREAT SERPL-MCNC: 0.8 MG/DL (ref 0.4–1.2)
EOSINOPHIL # BLD: 1.4 %
EOSINOPHILS ABSOLUTE: 0.2 THOU/MM3 (ref 0–0.4)
ERYTHROCYTE [DISTWIDTH] IN BLOOD BY AUTOMATED COUNT: 11.9 % (ref 11.5–14.5)
ERYTHROCYTE [DISTWIDTH] IN BLOOD BY AUTOMATED COUNT: 41.8 FL (ref 35–45)
GFR SERPL CREATININE-BSD FRML MDRD: 88 ML/MIN/1.73M2
GLUCOSE BLD-MCNC: 177 MG/DL (ref 70–108)
HCT VFR BLD CALC: 44.4 % (ref 37–47)
HEMOGLOBIN: 14.5 GM/DL (ref 12–16)
IMMATURE GRANS (ABS): 0.05 THOU/MM3 (ref 0–0.07)
IMMATURE GRANULOCYTES: 0.4 %
LYMPHOCYTES # BLD: 29.9 %
LYMPHOCYTES ABSOLUTE: 3.5 THOU/MM3 (ref 1–4.8)
MCH RBC QN AUTO: 31.3 PG (ref 26–33)
MCHC RBC AUTO-ENTMCNC: 32.7 GM/DL (ref 32.2–35.5)
MCV RBC AUTO: 95.7 FL (ref 81–99)
MONOCYTES # BLD: 6.4 %
MONOCYTES ABSOLUTE: 0.7 THOU/MM3 (ref 0.4–1.3)
NUCLEATED RED BLOOD CELLS: 0 /100 WBC
OSMOLALITY CALCULATION: 275 MOSMOL/KG (ref 275–300)
PLATELET # BLD: 304 THOU/MM3 (ref 130–400)
PMV BLD AUTO: 10.4 FL (ref 9.4–12.4)
POTASSIUM REFLEX MAGNESIUM: 3.8 MEQ/L (ref 3.5–5.2)
RBC # BLD: 4.64 MILL/MM3 (ref 4.2–5.4)
SEG NEUTROPHILS: 61.2 %
SEGMENTED NEUTROPHILS ABSOLUTE COUNT: 7.2 THOU/MM3 (ref 1.8–7.7)
SODIUM BLD-SCNC: 136 MEQ/L (ref 135–145)
WBC # BLD: 11.7 THOU/MM3 (ref 4.8–10.8)

## 2021-05-22 PROCEDURE — 93005 ELECTROCARDIOGRAM TRACING: CPT | Performed by: EMERGENCY MEDICINE

## 2021-05-22 PROCEDURE — 71045 X-RAY EXAM CHEST 1 VIEW: CPT

## 2021-05-22 PROCEDURE — 73600 X-RAY EXAM OF ANKLE: CPT

## 2021-05-22 PROCEDURE — 99285 EMERGENCY DEPT VISIT HI MDM: CPT

## 2021-05-22 PROCEDURE — 6360000002 HC RX W HCPCS: Performed by: EMERGENCY MEDICINE

## 2021-05-22 PROCEDURE — 2580000003 HC RX 258: Performed by: EMERGENCY MEDICINE

## 2021-05-22 PROCEDURE — 96375 TX/PRO/DX INJ NEW DRUG ADDON: CPT

## 2021-05-22 PROCEDURE — 2500000003 HC RX 250 WO HCPCS: Performed by: EMERGENCY MEDICINE

## 2021-05-22 PROCEDURE — 36415 COLL VENOUS BLD VENIPUNCTURE: CPT

## 2021-05-22 PROCEDURE — 27810 TREATMENT OF ANKLE FRACTURE: CPT

## 2021-05-22 PROCEDURE — 80048 BASIC METABOLIC PNL TOTAL CA: CPT

## 2021-05-22 PROCEDURE — 96374 THER/PROPH/DIAG INJ IV PUSH: CPT

## 2021-05-22 PROCEDURE — 85025 COMPLETE CBC W/AUTO DIFF WBC: CPT

## 2021-05-22 RX ORDER — KETAMINE HCL IN NACL, ISO-OSM 100MG/10ML
2 SYRINGE (ML) INJECTION ONCE
Status: COMPLETED | OUTPATIENT
Start: 2021-05-22 | End: 2021-05-22

## 2021-05-22 RX ORDER — PROPOFOL 10 MG/ML
1 INJECTION, EMULSION INTRAVENOUS ONCE
Status: DISCONTINUED | OUTPATIENT
Start: 2021-05-22 | End: 2021-05-23 | Stop reason: HOSPADM

## 2021-05-22 RX ORDER — SODIUM CHLORIDE 9 MG/ML
1000 INJECTION, SOLUTION INTRAVENOUS CONTINUOUS
Status: DISCONTINUED | OUTPATIENT
Start: 2021-05-22 | End: 2021-05-23 | Stop reason: HOSPADM

## 2021-05-22 RX ORDER — FENTANYL CITRATE 50 UG/ML
50 INJECTION, SOLUTION INTRAMUSCULAR; INTRAVENOUS ONCE
Status: COMPLETED | OUTPATIENT
Start: 2021-05-22 | End: 2021-05-22

## 2021-05-22 RX ADMIN — Medication 90 MG: at 23:28

## 2021-05-22 RX ADMIN — HYDROMORPHONE HYDROCHLORIDE 1 MG: 1 INJECTION, SOLUTION INTRAMUSCULAR; INTRAVENOUS; SUBCUTANEOUS at 22:27

## 2021-05-22 RX ADMIN — FENTANYL CITRATE 50 MCG: 50 INJECTION, SOLUTION INTRAMUSCULAR; INTRAVENOUS at 19:20

## 2021-05-22 RX ADMIN — SODIUM CHLORIDE 1000 ML: 9 INJECTION, SOLUTION INTRAVENOUS at 23:24

## 2021-05-22 ASSESSMENT — PAIN SCALES - GENERAL
PAINLEVEL_OUTOF10: 8
PAINLEVEL_OUTOF10: 9
PAINLEVEL_OUTOF10: 10

## 2021-05-22 ASSESSMENT — PAIN DESCRIPTION - LOCATION
LOCATION: ANKLE

## 2021-05-22 ASSESSMENT — PAIN DESCRIPTION - PAIN TYPE: TYPE: ACUTE PAIN

## 2021-05-22 ASSESSMENT — PAIN DESCRIPTION - ORIENTATION
ORIENTATION: RIGHT
ORIENTATION: RIGHT

## 2021-05-22 NOTE — ED NOTES
Bed: 019A  Expected date:   Expected time:   [facility]  Comments:     Louise Townsend RN  05/22/21 [id number]

## 2021-05-22 NOTE — ED NOTES
Pt medicated per provider order. Pt tolerates well. Pt family at bedside. Pt right ankle elevated on blanket at this time.       Albert Barkley RN  05/22/21 [id number]

## 2021-05-23 ENCOUNTER — APPOINTMENT (OUTPATIENT)
Dept: GENERAL RADIOLOGY | Age: 61
End: 2021-05-23
Payer: MEDICARE

## 2021-05-23 VITALS
RESPIRATION RATE: 20 BRPM | SYSTOLIC BLOOD PRESSURE: 167 MMHG | OXYGEN SATURATION: 100 % | WEIGHT: 198 LBS | HEART RATE: 100 BPM | TEMPERATURE: 98.4 F | BODY MASS INDEX: 31.96 KG/M2 | DIASTOLIC BLOOD PRESSURE: 97 MMHG

## 2021-05-23 LAB
EKG ATRIAL RATE: 79 BPM
EKG P AXIS: 64 DEGREES
EKG P-R INTERVAL: 164 MS
EKG Q-T INTERVAL: 408 MS
EKG QRS DURATION: 80 MS
EKG QTC CALCULATION (BAZETT): 467 MS
EKG R AXIS: 46 DEGREES
EKG T AXIS: 47 DEGREES
EKG VENTRICULAR RATE: 79 BPM

## 2021-05-23 PROCEDURE — 73600 X-RAY EXAM OF ANKLE: CPT

## 2021-05-23 PROCEDURE — 93010 ELECTROCARDIOGRAM REPORT: CPT | Performed by: INTERNAL MEDICINE

## 2021-05-23 RX ORDER — OXYCODONE HYDROCHLORIDE AND ACETAMINOPHEN 5; 325 MG/1; MG/1
1 TABLET ORAL EVERY 6 HOURS PRN
Qty: 14 TABLET | Refills: 0 | Status: SHIPPED | OUTPATIENT
Start: 2021-05-23 | End: 2021-05-27

## 2021-05-23 ASSESSMENT — ENCOUNTER SYMPTOMS
CHEST TIGHTNESS: 0
BACK PAIN: 0
WHEEZING: 0
COUGH: 0
VOMITING: 0
NAUSEA: 0
EYE REDNESS: 0
SHORTNESS OF BREATH: 0
ABDOMINAL PAIN: 0
RHINORRHEA: 0

## 2021-05-23 NOTE — ED NOTES
Discharge instructions and new medications discussed and explained with Pt. Pt. Verbalized understanding and has no further questions or needs at this time.         Liu Raines RN  05/23/21 [id number]

## 2021-05-23 NOTE — ED PROVIDER NOTES
[place] ENCOUNTER          Pt Name: Juan Miguel Hurtado  MRN: [id number]  [place] 1960  Date of evaluation: 5/22/2021  Emergency Physician: Celso Young, [facility]       Chief Complaint   Patient presents with    Ankle Pain     History obtained from the patient. HISTORY OF PRESENT ILLNESS    HPI  Juan Miguel Hurtado is a 57 y.o. female who presents to the emergency department for evaluation of right ankle pain. Patient states she was celebrating her grandmothers graduation today. She states she had an alcoholic beverage. She states she attempted to ambulate up the steps. She states her right foot missed the steps. She she states she fell forward and twisted her right ankle. She states she felt a pop and then turned onto her buttocks. EMS was called. She was unable to ambulate and deformity was noted to left ankle. Patient does not take any blood thinners. no wounds noted. The patient has no other acute complaints at this time. REVIEW OF SYSTEMS   Review of Systems   Constitutional: Negative for chills and fever. HENT: Negative for congestion and rhinorrhea. Eyes: Negative for redness and visual disturbance. Respiratory: Negative for cough, chest tightness, shortness of breath and wheezing. Cardiovascular: Negative for chest pain and leg swelling. Gastrointestinal: Negative for abdominal pain, nausea and vomiting. Endocrine: Negative for polydipsia and polyuria. Genitourinary: Negative for decreased urine volume, dysuria and urgency. Musculoskeletal: Positive for arthralgias and joint swelling. Negative for back pain, myalgias and neck pain. Skin: Negative for wound. Neurological: Negative for light-headedness and headaches. Hematological: Does not bruise/bleed easily. Psychiatric/Behavioral: Negative for confusion.          PAST MEDICAL AND SURGICAL HISTORY     Past Medical History:   Diagnosis Date  Arthritis     Asthma     Atypical chest pain     Atypical chest discomfort in hospital multiple times with stress test being normal.     Bell's palsy     COPD (chronic obstructive pulmonary disease) (HCC)     Costochondritis     Diabetes mellitus (HCC)     Fatigue     FH: CAD (coronary artery disease)     Strong family history of CAD.  High blood pressure     High cholesterol     Joint pain     Nonobstructive CAD (coronary artery disease)     Pseudoaneurysm (HCC)     Patient developed small pseudoaneurysm in right groin requiring thrombin injection by Dr. Ld Wick which was successful.  Tobacco abuse     Tobacco abuse on Habitrol patch.  Wheezing      Past Surgical History:   Procedure Laterality Date    BACK SURGERY  12/05/2016    CARDIOVASCULAR STRESS TEST  10 02 2009    Gated SPECT LV function demonstrated normal thickening, normal contractility, normal wall motion, EF of 46% which is low normal; however, visualized by myself appears to be 55%. No evidence of prior transmural MI or ischemia. Excellend treadmill exercise. Completed 10 METS, functional class I. No evidence of ischemic -induced EKG changes are noted. Appropriate hemodynamic response to exercise.  COLONOSCOPY      COLONOSCOPY N/A 2/13/2019    COLONOSCOPY POLYPECTOMY SNARE/COLD BIOPSY performed by Sarah Hagan MD at [place] [address] CATH LAB PROCEDURE  10 12 2009    LV demonstrates normal systolic function, EF 99%. No critical lesions are noted in all larger pericardial vessels & show that left main is widely patent. Circumflex is widely patent with anterior marginal one & two being widely patent. Left anterior descending artery is widely patent, diagonal one large caliber vessel, widely patent. Right coronary artery is widely patent & is dominant.      DOPPLER ECHOCARDIOGRAPHY  10 02 2009    LV demonstrated normal thickening, normal contractility, normal motion with mild systolic dilatation of the ventricle noted. EF 60%. Left atrium is mildly dilated. There is stage I diastolic dysfunction.  PARTIAL HYSTERECTOMY  1999    ROTATOR CUFF REPAIR Bilateral 2007         MEDICATIONS     Current Facility-Administered Medications:     0.9 % sodium chloride infusion, 1,000 mL, Intravenous, Continuous, Zhen Yu DO, Last Rate: 125 mL/hr at 05/22/21 2324, 1,000 mL at 05/22/21 2324    propofol injection 90 mg, 1 mg/kg, Intravenous, Once, Zhen Yu DO    Current Outpatient Medications:     oxyCODONE-acetaminophen (PERCOCET) 5-325 MG per tablet, Take 1 tablet by mouth every 6 hours as needed for Pain for up to 4 days. Intended supply: 3 days.  Take lowest dose possible to manage pain, Disp: 14 tablet, Rfl: 0    celecoxib (CELEBREX) 100 MG capsule, TAKE 1 CAPSULE BY MOUTH ONCE DAILY, Disp: , Rfl:     HYDROcodone-acetaminophen (NORCO) 5-325 MG per tablet, , Disp: , Rfl:     lisinopril-hydroCHLOROthiazide (PRINZIDE;ZESTORETIC) 20-25 MG per tablet, , Disp: , Rfl:     loratadine (CLARITIN) 10 MG tablet, TAKE 1 TABLET BY MOUTH EVERY DAY, Disp: , Rfl:     tiZANidine (ZANAFLEX) 4 MG tablet, TAKE 1 TABLET BY MOUTH ONCE DAILY AT BEDTIME, Disp: , Rfl:     Probiotic Product (PROBIOTIC DAILY PO), Take 1 capsule by mouth daily, Disp: , Rfl:     metoprolol succinate (TOPROL XL) 50 MG extended release tablet, Take 1 tablet by mouth daily, Disp: 90 tablet, Rfl: 3    acetaminophen (APAP EXTRA STRENGTH) 500 MG tablet, Take 1 tablet by mouth every 6 hours as needed for Pain, Disp: 120 tablet, Rfl: 0    Lifitegrast (XIIDRA OP), Apply to eye, Disp: , Rfl:     Omega-3 Fatty Acids (FISH OIL PO), Take by mouth, Disp: , Rfl:     albuterol sulfate HFA (PROAIR HFA) 108 (90 Base) MCG/ACT inhaler, Inhale 2 puffs into the lungs every 6 hours as needed for Wheezing, Disp: , Rfl:     potassium chloride (KLOR-CON M) 20 MEQ extended release tablet, Take 0.5 tablets by mouth daily, Disp: 60 tablet, Rfl: 3    buPROPion [facility] XL) 300 MG extended release tablet, Take 300 mg by mouth every morning, Disp: , Rfl:     carboxymethylcellulose 1 % ophthalmic solution, Place 1-2 drops into both eyes as needed for Dry Eyes, Disp: , Rfl:     glucose blood VI test strips (FREESTYLE LITE) strip, Testing once daily, Disp: 90 strip, Rfl: 1    amlodipine (NORVASC) 5 MG tablet, Take 5 mg by mouth daily. , Disp: , Rfl:     sitagliptan-metformin (JANUMET)  MG per tablet, Take 1 tablet by mouth 2 times daily. , Disp: , Rfl:     pravastatin (PRAVACHOL) 40 MG tablet, Take 40 mg by mouth daily , Disp: , Rfl:       SOCIAL HISTORY     Social History     Social History Narrative    Not on file     Social History     Tobacco Use    Smoking status: Current Every Day Smoker     Packs/day: 0.75     Years: 35.00     Pack years: 26.25     Types: Cigarettes    Smokeless tobacco: Never Used   Substance Use Topics    Alcohol use: Yes     Alcohol/week: 0.0 standard drinks     Comment: Patient states, \"occasionally. \"    Drug use: No         ALLERGIES     Allergies   Allergen Reactions    Morphine Itching         FAMILY HISTORY     Family History   Problem Relation Age of Onset    Heart Disease Mother     Hypertension Mother     High Cholesterol Mother     Pacemaker Mother     Heart Defect Brother     Heart Defect Brother     Heart Disease Sister 43        Sister had CABG.  Heart Disease Sister 55        Another sister had CABG. PHYSICAL EXAM     ED Triage Vitals   BP Temp Temp Source Pulse Resp SpO2 Height Weight   05/22/21 2018 05/22/21 1849 05/22/21 1849 05/22/21 1849 05/22/21 1849 05/22/21 1849 -- 05/22/21 2232   131/76 98.4 °F (36.9 °C) Oral 98 18 98 %  198 lb (89.8 kg)         Additional Vital Signs:  Vitals:    05/23/21 0000   BP: (!) 167/97   Pulse: 100   Resp: 20   Temp:    SpO2: 100%       Physical Exam  Constitutional:       General: She is not in acute distress. Appearance: She is well-developed. She is not diaphoretic. HENT:      Head: Normocephalic and atraumatic. Nose: No congestion or rhinorrhea. Eyes:      General:         Right eye: No discharge. Left eye: No discharge. Conjunctiva/sclera: Conjunctivae normal.      Pupils: Pupils are equal, round, and reactive to light. Neck:      Thyroid: No thyromegaly. Vascular: No JVD. Cardiovascular:      Rate and Rhythm: Normal rate and regular rhythm. Pulses: Normal pulses. Dorsalis pedis pulses are 2+ on the right side and 2+ on the left side. Posterior tibial pulses are 2+ on the right side and 2+ on the left side. Heart sounds: Normal heart sounds. Pulmonary:      Effort: Pulmonary effort is normal. No respiratory distress. Breath sounds: Normal breath sounds. Abdominal:      General: Bowel sounds are normal. There is no distension. Palpations: Abdomen is soft. Tenderness: There is no abdominal tenderness. Musculoskeletal:         General: Normal range of motion. Cervical back: Normal range of motion and neck supple. Right ankle: Swelling and deformity present. No lacerations. Tenderness present over the lateral malleolus and medial malleolus. No base of 5th metatarsal tenderness. Normal range of motion. Normal pulse. Right Achilles Tendon: Normal.      Left ankle: Normal. Normal pulse. Feet:      Right foot:      Skin integrity: No skin breakdown or erythema. Lymphadenopathy:      Cervical: No cervical adenopathy. Skin:     General: Skin is warm and dry. Capillary Refill: Capillary refill takes less than 2 seconds. Findings: No rash. Neurological:      General: No focal deficit present. Mental Status: She is alert and oriented to person, place, and time. She is not disoriented. GCS: GCS eye subscore is 4. GCS verbal subscore is 5. GCS motor subscore is 6. Cranial Nerves: No cranial nerve deficit. Sensory: Sensation is intact. No sensory deficit. on    05/22/2021 11:04 PM      XR ANKLE RIGHT (2 VIEWS)   Final Result   Fractures of the distal fibula and the medial malleolus at the right ankle. **This report has been created using voice recognition software. It may contain minor errors which are inherent in voice recognition technology. **      Final report electronically signed by Dr Hodan Blue on 5/22/2021 7:14 PM          ED Medications administered this visit:   Medications   0.9 % sodium chloride infusion (1,000 mLs Intravenous New Bag 5/22/21 2324)   propofol injection 90 mg ( Intravenous Not Given 5/22/21 2344)   fentaNYL (SUBLIMAZE) injection 50 mcg (50 mcg Intravenous Given 5/22/21 1920)   HYDROmorphone (DILAUDID) injection 1 mg (1 mg Intravenous Given 5/22/21 2227)   ketamine (KETALAR) injection 179.6 mg (90 mg Intravenous Given by Other 5/22/21 2328)         ED COURSE    Patient found to have right trimalleolar fracture. Patient was sedated and closed reduction was performed. No complications noted. Podiatry was consulted for follow-up. Patient to follow-up with Dr. Luis Adams. Patient has good family support. Her daughter is going to stay with her and assist with ADLs she is able to organize her house to rest to her ambulatory needs. The diagnosis, extensive differential diagnosis, laboratory and imaging findings were discussed at the bedside. The patient was an active participant in their care. They are agreeable to the plan of care. All questions and concerns were addressed at the time of the encounter. MEDICATION CHANGES     DISCHARGE MEDICATIONS:  New Prescriptions    OXYCODONE-ACETAMINOPHEN (PERCOCET) 5-325 MG PER TABLET    Take 1 tablet by mouth every 6 hours as needed for Pain for up to 4 days. Intended supply: 3 days.  Take lowest dose possible to manage pain            FINAL DISPOSITION     Final diagnoses:   Closed trimalleolar fracture of right ankle, initial encounter     Condition: condition: good  Dispo: Discharge to home    PATIENT REFERRED TO:  Martha Tevin, [address]  [phone]    Call in 1 day  To schedule an appointment in the next 3 days. Critical Care Time   None      This transcription was electronically signed. Parts of this transcriptions may have been dictated by use of voice recognition software and electronically transcribed, and parts may have been transcribed with the assistance of an ED scribe. The transcription may contain errors not detected in proofreading.     Electronically Signed: Criselda Corona DO, 05/23/21, 12:24 AM      Criselda Corona DO  05/23/21 1910

## 2021-05-23 NOTE — ED NOTES
Reduction completed at this time by Dr Zofia Arias. XR called at this time for post reduction XR. DP pulses present per Dr Zofia Arias.      Jennifer Maier RN  05/22/21 8255

## 2021-05-23 NOTE — ED NOTES
Patient assisted to bedpan at this time by this RN. Pt states pain is the same. Patient vtial signs stable and respirations unlabored.       Mookie Wong RN  05/22/21 2031

## 2021-05-23 NOTE — ED NOTES
Pt is alert and oriented at this time. Pt states \"I don't feel right. The room is spinning. [place]\" and starts to laugh. Pt reports pain in R ankle 2/10. Family at bedside.      Julia Philippe RN  05/22/21 3933

## 2021-05-23 NOTE — ED PROVIDER NOTES
normal    Sedation:  Patient sedated: yes  Sedation type: moderate (conscious) sedation  Sedatives: ketamine and see MAR for details  Vitals: Vital signs were monitored during sedation.     Immobilization: splint  Splint type: short leg (with stirrup)  Post-procedure neurovascular assessment: post-procedure neurovascularly intact  Post-procedure distal perfusion: normal  Post-procedure neurological function: normal  Patient tolerance: patient tolerated the procedure well with no immediate complications           Bessie Peng DO  Resident  05/22/21 2902

## 2021-05-23 NOTE — CONSULTS
Podiatric Surgery Consult    CC: Right ankle fracture    HPI:  The patient is a 57 y.o. female with significant past medical history of DM type II, asthma, COPD, hypercholesterolemia, and tobacco abuse who was seen at bedside this evening on behalf of Dr. Gina Ryan. Patient relates that earlier today, she was going down a flight of stairs when she lost her balance and fell. She denies hitting her head or losing consciousness. She states immediately after the fall there was severe pain in her right ankle. She tried to stand up and bear weight but was unable to do so. She decided to come to the ED and was brought to [facility]'s ED by her family. Patient denies any loss of sensation to the toes of the right foot, numbness, or tingling to the right foot. She endorses exquisite pain to the right ankle and rates it as 10/10. Patient was given IV Dilaudid in the ED for pain control. Patient endorses smoking half a pack a day for over 20 years. Patient denies any N/V/F/C/SOB/CP or bilateral calf tenderness. She has no other pedal complaints at this time. Past Medical History:        Diagnosis Date    Arthritis     Asthma     Atypical chest pain     Atypical chest discomfort in hospital multiple times with stress test being normal.     Bell's palsy     COPD (chronic obstructive pulmonary disease) (HCC)     Costochondritis     Diabetes mellitus (HCC)     Fatigue     FH: CAD (coronary artery disease)     Strong family history of CAD.  High blood pressure     High cholesterol     Joint pain     Nonobstructive CAD (coronary artery disease)     Pseudoaneurysm (HCC)     Patient developed small pseudoaneurysm in right groin requiring thrombin injection by Dr. Abdi Mendez which was successful.  Tobacco abuse     Tobacco abuse on Habitrol patch.     Wheezing        Past Surgical History:        Procedure Laterality Date    BACK SURGERY  12/05/2016    CARDIOVASCULAR STRESS TEST  10 02 2009    [facility] 54        Another sister had CABG. REVIEW OF SYSTEMS:    Constitutional: Negative for fever, chills, and sudden weight loss or gain. HEENT: Negative for blurry/double vision, ears, nose, or throat pain. Negative for mass. Respiratory: Negative for shortness of breath or hemoptysis. Cardiovascular: Negative for angina, palpitations, or lower extremity edema. Gastrointestinal: Negative for nausea, vomiting, diarrhea, constipation, or abdominal pain. Genitourinary: Negative for increased urination, burning with urination, or hematuria. Musculoskeletal: See above HPI. Integument: See above HPI. Hematology: Negative for easy bruising or easy bleeding. Physical Examination:  General: Awake, alert, and oriented. In no acute distress. Resting in bed. HEENT: Atraumatic, normocephalic. Respiratory: CTA. No rales, rhonchi, or wheezing. Cardiovascular: RRR. Normal S1, S2.  Abdomen: Soft, non-tender, non-distended. Bowel sounds present x4 quadrants. Focused Lower Extremity Examination:    Vitals:    BP (!) 167/97   Pulse 100   Temp 98.4 °F (36.9 °C) (Oral)   Resp 20   Wt 198 lb (89.8 kg)   SpO2 100%   BMI 31.96 kg/m²      Dermatologic: No open lesions noted to bilateral LE. No skin tenting or fracture blisters noted to the right ankle. Mild ecchymosis and moderate edema noted to the right ankle. Quality of skin atrophic and xerotic bilaterally. Nails 1-5 bilaterally are within normal limits. Webspaces 1-4 bilaterally are clean, dry and intact. No hyperkeratotic lesions noted bilaterally. No open lesions, rashes or subcutaneous nodules of note. Vascular: Dorsalis pedis and posterior tibial pulses are palpable bilaterally. Skin temperature is warm to warm from proximal tibial tuberosity to distal digits. CFT brisk to all 10 digits. Edema noted to the right ankle. Pedal hair is absent bilaterally.     Neurovascular: Light touch sensation grossly intact to the level of the digits bilaterally. Musculoskeletal: Muscle strength testing deferred secondary to acute traumatic fracture of right ankle. Right foot externally rotated and posteriorly displaced relative to the tibia. Exquisite pain with palpation of right ankle structures. STUDIES:  XR right ankle  Impression   Fractures of the distal fibula and the medial malleolus at the right ankle. **This report has been created using voice recognition software. It may contain minor errors which are inherent in voice recognition technology. **       Final report electronically signed by Dr Onofre Dexter on 5/22/2021 7:14 PM       CULTURES:    LABS:   Recent Labs     05/22/21  2150   WBC 11.7*   HGB 14.5   HCT 44.4           Recent Labs     05/22/21  2150      K 3.8      CO2 19*   BUN 9   CREATININE 0.8      No results for input(s): PROT, INR, APTT in the last 72 hours. No results for input(s): CKTOTAL, CKMB, CKMBINDEX, TROPONINI in the last 72 hours. Assessment: 57 y.o. female with:  Principle  Closed, displaced bimalleolar right ankle fracture, initial encounter    Chronic  Patient Active Problem List   Diagnosis    Nonobstructive CAD (coronary artery disease)    Asthma    Diabetes mellitus ([facility]    High cholesterol    Wheezing    Fatigue    Joint pain    Arthritis    FH: CAD (coronary artery disease)    Tobacco abuse    Pseudoaneurysm (HCC)    Costochondritis    Bell's palsy    Neck pain    Thyroid nodule    Nicotine abuse    SOB (shortness of breath)    Multinodular goiter    COPD with acute exacerbation (HCC)    Palpitation    Pure hypercholesterolemia    Essential hypertension    Anxiety    Low back pain    Degeneration of lumbar intervertebral disc    Lumbosacral radiculopathy    SOB (shortness of breath) on exertion       Plan  Closed, displaced bimalleolar right ankle fracture, initial encounter  -Patient initially examined and evaluated.   -Reviewed radiographs; impression above  -Patient received IV Dilaudid for pain control in the ED  -Discussed with Dr. eJri Kyle the need to perform closed reduction of patient's right ankle in the ED in preparation for subsequent ORIF of right ankle  -Discussed with the patient and family at length the need to perform closed reduction and splinting of the patient's right ankle in the ED. Explained to the patient and the family that definitive fracture repair (consisting of hardware implantation of plates and screws) will happen within the next 5 to 7 days.  -Closed reduction was performed in the ED by Dr. Jeri Kyle and Dr. Akiko Bojorquez (see ED provider notes for more details)  -Discussed with the patient and her family that she is to follow-up with Dr. Mia Hernandez; his office will reach out to her this Monday, 5/24/2021 and she is to follow-up Tuesday, 5/25/2021. The ORIF of the right ankle will be scheduled at that time.  -Instructed patient to be NWB to RLE; ED provider will dispense crutches, and patient and her family are encouraged to obtain knee scooter or wheelchair to assist with ambulation.  -The patient and her family members verbalized understanding and agreement with the plan. All of their questions were answered to their satisfaction. Dr. Jeri Kyle, thank you for the consultation, allowing podiatry to assist in the medical welfare of this patient. Podiatry will continue to follow this patient throughout the duration of hospitalization. Genesis Jimenez DPM, PGY-1  Foot and Ankle Surgical Resident  5/23/2021 12:39 AM    Please refer to resident physicians note for further details. Discussed with resident physician assessment and findings via telephone, I agree with plan as documented. Patient to follow up in outpatient setting.   Rosa Warren, [address] [facility]

## 2021-05-25 ENCOUNTER — TELEPHONE (OUTPATIENT)
Dept: CARDIOLOGY CLINIC | Age: 61
End: 2021-05-25

## 2021-05-25 NOTE — TELEPHONE ENCOUNTER
Pre op Risk Assessment    Procedure ORIF RIGHT ANKLE PT BROKE IT   Physician Dr. Abdifatah Gilliland  Date of surgery/procedure 5-  Last ov 2-17-21  Last Stress 1-9-19  Last Echo 3-1-21  Last Cath na  Last Stent   Is patient on blood thinners none  Hold Meds/how many days

## 2021-05-27 LAB
METER GLUCOSE: 139 MG/DL (ref 70–110)
METER GLUCOSE: 164 MG/DL (ref 70–110)

## 2021-08-12 ENCOUNTER — OFFICE VISIT (OUTPATIENT)
Dept: INTERNAL MEDICINE CLINIC | Age: 61
End: 2021-08-12
Payer: MEDICARE

## 2021-08-12 VITALS
BODY MASS INDEX: 30.86 KG/M2 | TEMPERATURE: 97.5 F | HEART RATE: 78 BPM | HEIGHT: 66 IN | SYSTOLIC BLOOD PRESSURE: 138 MMHG | WEIGHT: 192 LBS | DIASTOLIC BLOOD PRESSURE: 70 MMHG

## 2021-08-12 DIAGNOSIS — E04.1 THYROID NODULE: ICD-10-CM

## 2021-08-12 DIAGNOSIS — E11.9 WELL CONTROLLED TYPE 2 DIABETES MELLITUS (HCC): Primary | ICD-10-CM

## 2021-08-12 LAB — HBA1C MFR BLD: 6.5 % (ref 4.3–5.7)

## 2021-08-12 PROCEDURE — 3044F HG A1C LEVEL LT 7.0%: CPT | Performed by: INTERNAL MEDICINE

## 2021-08-12 PROCEDURE — 3017F COLORECTAL CA SCREEN DOC REV: CPT | Performed by: INTERNAL MEDICINE

## 2021-08-12 PROCEDURE — 4004F PT TOBACCO SCREEN RCVD TLK: CPT | Performed by: INTERNAL MEDICINE

## 2021-08-12 PROCEDURE — G8417 CALC BMI ABV UP PARAM F/U: HCPCS | Performed by: INTERNAL MEDICINE

## 2021-08-12 PROCEDURE — 99214 OFFICE O/P EST MOD 30 MIN: CPT | Performed by: INTERNAL MEDICINE

## 2021-08-12 PROCEDURE — 83036 HEMOGLOBIN GLYCOSYLATED A1C: CPT | Performed by: INTERNAL MEDICINE

## 2021-08-12 PROCEDURE — G8428 CUR MEDS NOT DOCUMENT: HCPCS | Performed by: INTERNAL MEDICINE

## 2021-08-12 PROCEDURE — 2022F DILAT RTA XM EVC RTNOPTHY: CPT | Performed by: INTERNAL MEDICINE

## 2021-08-12 RX ORDER — BLOOD-GLUCOSE METER
KIT MISCELLANEOUS
Qty: 300 STRIP | Refills: 5 | Status: SHIPPED | OUTPATIENT
Start: 2021-08-12

## 2021-08-12 NOTE — PROGRESS NOTES
[facility] PROFESSIONAL SERVS  PHYSICIANS [facility] [address]  [address] [facility], [facility]  Dept: [phone]  Dept Fax: [phone]    Chief Complaint   Patient presents with    1 Year Follow Up    Diabetes     TYPE 2     Hypertension    Goiter     Patient presents for evaluation of diabetes, and thyroid issues. I saw her 12 months ago. This patient is followed regularly by Dr. Dora Amato  Patient used to see Dr. Becky Gibbs as well as Lucas Salgado. She was diagnosed with diabetes 10 years ago. She was on insulin for a short time but no longer takes it secondary to low blood sugars. She takes Janumet  She has COPD, continues to smoke multiple other comorbidities    No concerns or hospital admissions since last visit. Past Medical History:   Diagnosis Date    Arthritis     Asthma     Atypical chest pain     Atypical chest discomfort in hospital multiple times with stress test being normal.     Bell's palsy     COPD (chronic obstructive pulmonary disease) (HCC)     Costochondritis     Diabetes mellitus (HCC)     Fatigue     FH: CAD (coronary artery disease)     Strong family history of CAD.  High blood pressure     High cholesterol     Joint pain     Nonobstructive CAD (coronary artery disease)     Pseudoaneurysm (HCC)     Patient developed small pseudoaneurysm in right groin requiring thrombin injection by Dr. Rebecca Villalpando which was successful.  Tobacco abuse     Tobacco abuse on Habitrol patch.     Wheezing        Current Outpatient Medications   Medication Sig Dispense Refill    HYDROcodone-acetaminophen (NORCO) 5-325 MG per tablet       lisinopril-hydroCHLOROthiazide (PRINZIDE;ZESTORETIC) 20-25 MG per tablet       loratadine (CLARITIN) 10 MG tablet TAKE 1 TABLET BY MOUTH EVERY DAY      metoprolol succinate (TOPROL XL) 50 MG extended release tablet Take 1 tablet by mouth daily 90 tablet 3    acetaminophen (APAP EXTRA STRENGTH) 500 MG tablet Take 1 tablet by mouth every 6 hours as needed soft, nontender, nondistended, no masses or organomegaly  Neurological - alert, oriented, normal speech, no focal findings or movement disorder noted  Musculoskeletal - no joint tenderness, deformity or swelling  Extremities - peripheral pulses normal, no pedal edema, no clubbing or cyanosis  Skin - normal coloration and turgor, no rashes, no suspicious skin lesions noted     THYROID ULTRASOUND:(1/18/18)  Impression   1. There is stable mild thyromegaly. 2. There is a stable 0.5 x 0.4 x 0.5 cm complex cystic nodule within the medial aspect of the lower pole the left lobe of the thyroid gland (low suspicion pattern American Thyroid Association criteria). 3. Stable hypervascularity throughout the thyroid gland which is nonspecific however can be associated with a thyroiditis. 4. A follow-up ultrasound examination of the thyroid gland in    1-2 years is recommended to confirm continued stability.            Diagnosis Orders   1. Well controlled type 2 diabetes mellitus ([facility] [address]  POCT glycosylated hemoglobin (Hb A1C)    blood glucose test strips (FREESTYLE LITE) strip        Extensive counseling was done regarding diabetes. The goals are to decrease or prevent the complications of diabetes and improve survival. The following goals were discussed    HgbA1c < 7 (providing no hypoglycemia)  BMI  18-25  BP < 130/80  STATIN(medium or high risk)  DIET <20% protein  < 30% fat, no trans fats, calorie restricted if BMI high  URINE MICROALBUMIN/CREATINIE  < 30  DILATED EYE EXAM EVERY 1-2 YEARS  MONITOR FEET FOR SORES, NEUROPATHY, ETC  REGULAR DENTAL CARE  Refer to podiatry for foot pain  Previously discussed smoking and many of the possible adverse side effects including COPD, MI, stroke, lung, esophageal, head and neck and bladder cancer as well as recurrent URIs. Modalities to help quitting were discussed as well, including nicotine replacement, wellbutrin and varenicline.  Rewards of quitting discussed as well    Her hemoglobin A1c today is 6.5. We'll probably need thyroid ultrasound every 1-2 years  I will see her in 12 months.

## 2021-08-20 ENCOUNTER — HOSPITAL ENCOUNTER (OUTPATIENT)
Dept: ULTRASOUND IMAGING | Age: 61
Discharge: HOME | End: 2021-08-20
Payer: MEDICARE

## 2021-08-20 ENCOUNTER — OFFICE VISIT (OUTPATIENT)
Dept: CARDIOLOGY CLINIC | Age: 61
End: 2021-08-20
Payer: MEDICARE

## 2021-08-20 VITALS
WEIGHT: 187.4 LBS | HEART RATE: 79 BPM | SYSTOLIC BLOOD PRESSURE: 134 MMHG | HEIGHT: 66 IN | DIASTOLIC BLOOD PRESSURE: 78 MMHG | BODY MASS INDEX: 30.12 KG/M2

## 2021-08-20 DIAGNOSIS — I10 ESSENTIAL HYPERTENSION: Primary | ICD-10-CM

## 2021-08-20 DIAGNOSIS — E04.1 THYROID NODULE: ICD-10-CM

## 2021-08-20 DIAGNOSIS — E78.00 PURE HYPERCHOLESTEROLEMIA: ICD-10-CM

## 2021-08-20 PROCEDURE — 4004F PT TOBACCO SCREEN RCVD TLK: CPT | Performed by: INTERNAL MEDICINE

## 2021-08-20 PROCEDURE — G8417 CALC BMI ABV UP PARAM F/U: HCPCS | Performed by: INTERNAL MEDICINE

## 2021-08-20 PROCEDURE — 3017F COLORECTAL CA SCREEN DOC REV: CPT | Performed by: INTERNAL MEDICINE

## 2021-08-20 PROCEDURE — G8427 DOCREV CUR MEDS BY ELIG CLIN: HCPCS | Performed by: INTERNAL MEDICINE

## 2021-08-20 PROCEDURE — 99213 OFFICE O/P EST LOW 20 MIN: CPT | Performed by: INTERNAL MEDICINE

## 2021-08-20 PROCEDURE — 76536 US EXAM OF HEAD AND NECK: CPT

## 2021-08-20 RX ORDER — DIMENHYDRINATE 50 MG
TABLET ORAL
COMMUNITY
Start: 2021-05-26

## 2021-08-20 NOTE — PROGRESS NOTES
Chief Complaint   Patient presents with    1 Year Follow Up    Hypertension    Coronary Artery Disease       Pat sent  pcp for mild cardiomegaly and lAE reported on CXR done dec 2020      1 year follow up. EKG done 5-. Hx of Left shoulder pain and worse with moving the left arm -had stress test at [facility] and was informed negative per pat  HX OF BURSITIS    Denied  Chest pain, sob, dizzinesss, palpitation or edema    Record reviewed      Patient Active Problem List   Diagnosis    Nonobstructive CAD (coronary artery disease)    Asthma    Diabetes mellitus ([address]    High cholesterol    Wheezing    Fatigue    Joint pain    Arthritis    FH: CAD (coronary artery disease)    Tobacco abuse    Pseudoaneurysm (HCC)    Costochondritis    Bell's palsy    Neck pain    Thyroid nodule    Nicotine abuse    SOB (shortness of breath)    Multinodular goiter    COPD with acute exacerbation (HCC)    Palpitation    Pure hypercholesterolemia    Essential hypertension    Anxiety    Low back pain    Degeneration of lumbar intervertebral disc    Lumbosacral radiculopathy    SOB (shortness of breath) on exertion       Past Surgical History:   Procedure Laterality Date    ANKLE FRACTURE SURGERY Right 05/27/2021    BACK SURGERY  12/05/2016    CARDIOVASCULAR STRESS TEST  10 02 2009    Gated SPECT LV function demonstrated normal thickening, normal contractility, normal wall motion, EF of 46% which is low normal; however, visualized by myself appears to be 55%. No evidence of prior transmural MI or ischemia. Excellend treadmill exercise. Completed 10 METS, functional class I. No evidence of ischemic -induced EKG changes are noted. Appropriate hemodynamic response to exercise.      COLONOSCOPY      COLONOSCOPY N/A 2/13/2019    COLONOSCOPY POLYPECTOMY SNARE/COLD BIOPSY performed by Antonio Navas MD at [place] [address] CATH LAB PROCEDURE  10 12 2009    LV demonstrates normal systolic function, EF 89%. No critical lesions are noted in all larger pericardial vessels & show that left main is widely patent. Circumflex is widely patent with anterior marginal one & two being widely patent. Left anterior descending artery is widely patent, diagonal one large caliber vessel, widely patent. Right coronary artery is widely patent & is dominant.  DOPPLER ECHOCARDIOGRAPHY  10 02 2009    LV demonstrated normal thickening, normal contractility, normal motion with mild systolic dilatation of the ventricle noted. EF 60%. Left atrium is mildly dilated. There is stage I diastolic dysfunction.  PARTIAL HYSTERECTOMY  1999    ROTATOR CUFF REPAIR Bilateral 2007       Allergies   Allergen Reactions    Morphine Itching        Family History   Problem Relation Age of Onset    Heart Disease Mother     Hypertension Mother     High Cholesterol Mother     Pacemaker Mother     Heart Defect Brother     Heart Defect Brother     Heart Disease Sister 43        Sister had CABG.  Heart Disease Sister 55        Another sister had CABG. Social History     Socioeconomic History    Marital status: Single     Spouse name: Not on file    Number of children: Not on file    Years of education: Not on file    Highest education level: Not on file   Occupational History    Not on file   Tobacco Use    Smoking status: Current Every Day Smoker     Packs/day: 0.75     Years: 35.00     Pack years: 26.25     Types: Cigarettes    Smokeless tobacco: Never Used   Substance and Sexual Activity    Alcohol use: Yes     Alcohol/week: 0.0 standard drinks     Comment: Patient states, \"occasionally. \"    Drug use: No    Sexual activity: Yes     Partners: Male   Other Topics Concern    Not on file   Social History Narrative    Not on file     Social Determinants of Health     Financial Resource Strain:     Difficulty of Paying Living Expenses:    Food Insecurity:     Worried About Running Out of Food in the Last Year:     Ran Out of Food in the Last Year:    Transportation Needs:     Lack of Transportation (Medical):      Lack of Transportation (Non-Medical):    Physical Activity:     Days of Exercise per Week:     Minutes of Exercise per Session:    Stress:     Feeling of Stress :    Social Connections:     Frequency of Communication with Friends and Family:     Frequency of Social Gatherings with Friends and Family:     Attends Alevism Services:     Active Member of Clubs or Organizations:     Attends Club or Organization Meetings:     Marital Status:    Intimate Partner Violence:     Fear of Current or Ex-Partner:     Emotionally Abused:     Physically Abused:     Sexually Abused:        Current Outpatient Medications   Medication Sig Dispense Refill    Flaxseed, Linseed, (FLAX SEED OIL) 1000 MG CAPS Flaxseed Oil Active 1000 MG PO Daily May 26th, 2021 11:37am      Cholecalciferol (VITAMIN D3) 125 MCG (5000 UT) CHEW Take by mouth      Zinc Sulfate (ZINC 15 PO) Take by mouth      blood glucose test strips (FREESTYLE LITE) strip Testing once daily 300 strip 5    HYDROcodone-acetaminophen (NORCO) 5-325 MG per tablet       lisinopril-hydroCHLOROthiazide (PRINZIDE;ZESTORETIC) 20-25 MG per tablet       loratadine (CLARITIN) 10 MG tablet TAKE 1 TABLET BY MOUTH EVERY DAY      metoprolol succinate (TOPROL XL) 50 MG extended release tablet Take 1 tablet by mouth daily 90 tablet 3    acetaminophen (APAP EXTRA STRENGTH) 500 MG tablet Take 1 tablet by mouth every 6 hours as needed for Pain 120 tablet 0    albuterol sulfate HFA (PROAIR HFA) 108 (90 Base) MCG/ACT inhaler Inhale 2 puffs into the lungs every 6 hours as needed for Wheezing      potassium chloride (KLOR-CON M) 20 MEQ extended release tablet Take 0.5 tablets by mouth daily 60 tablet 3    buPROPion (WELLBUTRIN XL) 300 MG extended release tablet Take 300 mg by mouth every morning      carboxymethylcellulose 1 % ophthalmic solution Place 1-2 drops into both eyes as needed for Dry Eyes      amlodipine (NORVASC) 5 MG tablet Take 5 mg by mouth daily.  sitagliptan-metformin (JANUMET)  MG per tablet Take 1 tablet by mouth 2 times daily.  pravastatin (PRAVACHOL) 40 MG tablet Take 40 mg by mouth daily        No current facility-administered medications for this visit. Review of Systems -     General ROS: negative  Psychological ROS: negative  Hematological and Lymphatic ROS: No history of blood clots or bleeding disorder. Respiratory ROS: no cough,  or wheezing, the rest see HPI  Cardiovascular ROS: See HPI  Gastrointestinal ROS: negative  Genito-Urinary ROS: no dysuria, trouble voiding, or hematuria  Musculoskeletal ROS: negative  Neurological ROS: no TIA or stroke symptoms  Dermatological ROS: negative      Blood pressure 134/78, pulse 79, height 5' 6\" (1.676 m), weight 187 lb 6.4 oz (85 kg), not currently breastfeeding. Physical Examination:    General appearance - alert, well appearing, and in no distress  HEENT- Pink conjunctiva  , Non-icteri sclera,PERRLA  Mental status - alert, oriented to person, place, and time  Neck - supple, no significant adenopathy, no JVD, or carotid bruits  Chest - clear to auscultation, no wheezes, rales or rhonchi, symmetric air entry  Heart - normal rate, regular rhythm, normal S1, S2, no murmurs, rubs, clicks or gallops  Abdomen - soft, nontender, nondistended, no masses or organomegaly  CARLENE- no CVA or flank tenderness, no suprapubic tenderness  Neurological - alert, oriented, normal speech, no focal findings or movement disorder noted  Musculoskeletal/limbs - no joint tenderness, deformity or swelling   - peripheral pulses normal, no pedal edema, no clubbing or cyanosis  Skin - normal coloration and turgor, no rashes, no suspicious skin lesions noted  Psych- appropriate mood and affect    Lab  No results for input(s): CKTOTAL, CKMB, CKMBINDEX, TROPONINI in the last 72 hours.   CBC:   Lab Results   Component Value Date    WBC 11.7 05/22/2021    RBC 4.64 05/22/2021    RBC 4.46 11/11/2011    HGB 14.5 05/22/2021    HCT 44.4 05/22/2021    MCV 95.7 05/22/2021    MCH 31.3 05/22/2021    MCHC 32.7 05/22/2021    RDW 13.2 05/25/2018     05/22/2021    MPV 10.4 05/22/2021     BMP:    Lab Results   Component Value Date     05/22/2021    K 3.8 05/22/2021     05/22/2021    CO2 19 05/22/2021    BUN 9 05/22/2021    LABALBU 4.0 12/25/2020    LABALBU 4.4 10/27/2011    CREATININE 0.8 05/22/2021    CALCIUM 9.3 05/22/2021    LABGLOM 88 05/22/2021    GLUCOSE 177 05/22/2021    GLUCOSE 100 10/30/2011     Hepatic Function Panel:    Lab Results   Component Value Date    ALKPHOS 92 12/25/2020    ALT 28 12/25/2020    AST 28 12/25/2020    PROT 6.4 12/25/2020    BILITOT 0.3 12/25/2020    BILIDIR <0.2 09/07/2019    LABALBU 4.0 12/25/2020    LABALBU 4.4 10/27/2011     Magnesium:    Lab Results   Component Value Date    MG 1.8 01/09/2019     Warfarin PT/INR:  No components found for: PTPATWAR, PTINRWAR  HgBA1c:    Lab Results   Component Value Date    LABA1C 6.5 08/12/2021    LABA1C 6.5 01/08/2019     FLP:    Lab Results   Component Value Date    TRIG 117 05/25/2018    HDL 44 05/25/2018    LDLCALC 69 05/25/2018     TSH:    Lab Results   Component Value Date    TSH 0.620 08/23/2018      She had a cardiac catheterization in 2009 and normal LV function and no significant  obstructive lesion. Widely patent and dominant RCA; diagonal large  caliber, widely patent; LAD, widely patent; large vessel and left  circumflex, widely patent. So basically, all the arteries are widely  patent in the cardiac catheterization then. Conclusions      Summary   Lexiscan EKG stress test is not suggestive for ischemia. This Nuclear Medicine study was negative for ischemia .       Signatures      ----------------------------------------------------------------   Electronically signed by Josef Whipple MD (Interpreting   Cardiologist) on 01/09/2019 at 15:29   ----------------------------------------------------------------       Conclusions      Summary   Normal left ventricle size and Low Normal LV systolic function. Ejection   fraction was estimated at 50 %. There were no regional left ventricular   wall motion abnormalities and wall thickness was within normal limits. The left atrium is Mildly dilated. Signature      ----------------------------------------------------------------   Electronically signed by Kelly Romero MD (Interpreting   physician) on 01/09/2019 at 09:23 PM   ----------------------------------------------------------------    Stable chest x-ray no interval change since previous study dated 24 December 2020.   2. Mild cardiomegaly with possible left atrial enlargement   3. Otherwise negative chest x-ray. .                   This report has been created using voice recognition software. It may contain minor errors which are inherent in voice recognition technology.       Final report electronically signed by DR Pamela Velasquez on 12/25/2020 9:57 AM      Conclusions      Summary   Normal left ventricle size and systolic function. Ejection fraction was   estimated at 60 to 65 %. There were no regional left ventricular wall   motion abnormalities and wall thickness was within normal limits. Doppler parameters were consistent with abnormal left ventricular   relaxation (grade 1 diastolic dysfunction). Signature      ----------------------------------------------------------------   Electronically signed by Kelly Romero MD (Interpreting   physician) on 03/01/2021 at 07:03 PM   ----------------------------------------------------------------        ekg dec 2020  Normal sinus rhythm  Normal ECG  When compared with ECG of 24-DEC-2020 20:00,  No significant change was found    Assessment  Hx of sinus tachy   Diagnosis Orders   1. Essential hypertension     2.  Pure hypercholesterolemia             Recent admission DX  ASSESSMENT:  1. Atypical chest pain, appears to be more musculoskeletal with chest  wall tenderness. 2.  COPD. 3.  Hypertension. 4.  Hyperlipidemia. 5.  History of diabetes.       Plan     The current meds and labs reviewed    Continue the current treatment and with constant vigilance to changes in symptoms and also any potential side effects. Return for care or seek medical attention immediately if symptoms got worse and/or develop new symptoms. Hypertension, on medical treatment. Seems to be under good control. Patient is compliant with medical treatment. Hx of Sinus tachycardia  Cont toprol xl 50     Sob on exertion left shoulder pain and tenderness  cxr possible cardiomegaly  Echo ef wnl  [facility]uc [facility]    D/w the pat the plan of care   Hyperlipidemia: on statins, followed periodically. Patient need periodic lipid and liver profile. PCP FOLLOW lp AND lft PER PAT    Smoking: discussed with the patient the importance of smoke cessation especially with the risk of CAD. Lipid panel and liver function test before next appointment    PAT IS Doing well and stable    D/w the pat the plan of care    Discussed use, benefit, and side effects of prescribed medications. All patient questions answered. Pt voiced understanding. Instructed to continue current medications, diet and exercise. Continue risk factor modification and medical management. Patient agreed with treatment plan. Follow up as directed.     RTC in  1 year      [facility]

## 2021-09-10 ENCOUNTER — HOSPITAL ENCOUNTER (OUTPATIENT)
Dept: PHYSICAL THERAPY | Age: 61
Setting detail: THERAPIES SERIES
Discharge: HOME | End: 2021-09-10
Payer: MEDICARE

## 2021-09-10 PROCEDURE — 97162 PT EVAL MOD COMPLEX 30 MIN: CPT

## 2021-09-10 PROCEDURE — 97110 THERAPEUTIC EXERCISES: CPT

## 2021-09-13 ENCOUNTER — HOSPITAL ENCOUNTER (OUTPATIENT)
Dept: PHYSICAL THERAPY | Age: 61
Setting detail: THERAPIES SERIES
Discharge: HOME | End: 2021-09-13
Payer: MEDICARE

## 2021-09-13 PROCEDURE — 97110 THERAPEUTIC EXERCISES: CPT

## 2021-09-13 NOTE — PROGRESS NOTES
[facility]  PHYSICAL THERAPY  [] EVALUATION  [x] DAILY NOTE (LAND) [] DAILY NOTE (AQUATIC ) [] PROGRESS NOTE [] DISCHARGE NOTE    [x] OUTPATIENT REHABILITATION CENTER [facility]   [] [place] [address]    [] [facility]   [] Linette Mcmahon    Date: 2021  Patient Name:  Maddie Fournier  : 1960  MRN: [id number]  CSN: [id number]    Referring Practitioner LUMA Abarca*   Diagnosis Displaced bimalleolar fracture of right lower leg, subsequent encounter for closed fracture with routine healing [S82.844C]    Treatment Diagnosis S/p right ankle fracture with ORIF with difficulty walking, decreased balance, pain, decreased strength   Date of Evaluation 9/10/21    Additional Pertinent History Right displaced bimalleolar fracture 21, back surgery , arthritis, asthma, COPD, DM, HTN, CAD      Functional Outcome Measure Used FAAM   Functional Outcome Score 48/84 (9/10/21)       Insurance: Primary: Payor: Jame Rodriguez /  /  / ,   Secondary: MEDICAID [place]   Authorization Information: PRE CERTIFICATION REQUIRED: NO  INSURANCE THERAPY BENEFIT:  NO VISIT LIMIT   AQUATIC THERAPY COVERED: YES  MODALITIES COVERED:  YES  TELEHEALTH COVERED:    REFERENCE NUMBER: [id number]   Visit # 2, 2/10 for progress note   Visits Allowed: No visit limit   Recertification Date:    Physician Follow-Up: 2021   Physician Orders: eval and treat for 6 weeks   History of Present Illness: Patient fracture ankle 2021. Was walking down step and fell on right ankle. Surgery on 2021. Patient states ankle has been immobilized for past 4 months with cast for 2 month, then walking boot, and now in ASO ankle support. SUBJECTIVE:  Patient wearing ASO brace into therapy session today. Reports her pain is 7/10 and she has been trying to work on some of the exercises at home. TREATMENT   Precautions: Wears ASO ankle support for wb activities.     Pain: 7/10 Right ankle X in shaded column indicates activity completed today   Modalities Parameters/  Location  Notes                     Manual Therapy Time/Technique  Notes                     Exercise/Intervention   Notes   Active ankle dorsiflexion/plantarflexion, inversion/eversion, circles clockwise/counter-clockwise 10x  X    Seated rockerboard 2 directions 10x  X    Seated small wooden baps board circles clockwise/counter-clockwise 10x  X    Seated heel/toe raises 10x  X           Matrix bike (seat 6) - slow pace  3 minutes X Patient not able to tolerate longer time due to pain          Reviewed use of ice and elevation for pain and swelling   X    Gait training: heel to toe pattern with walking   X                                                              Specific Interventions Next Treatment: ankle ROM, seated rockerboard, small mini disc (BAPS), progress to strengthening, balance and standing ex with closed chain through RLE, Stat. Bicycle. Activity/Treatment Tolerance:   [x]  Patient tolerated treatment well  []  Patient limited by fatigue  [x]  Patient limited by pain   []  Patient limited by medical complications  []  Other:     Assessment:  Patient completed seated exercises with limited range of motion with painful end ranges. Limited time on bike due to increasing pain. Patient rated pain 7-8/10 at end of session. Instructed patient in ice and elevation for pain ans swelling control. GOALS:  Patient Goal: To have less pain and swelling at her right ankle. Improve her walking, balance. Short Term Goals:  Time Frame: 4 weeks  1. Patient to report of decreased right ankle pain with wb standing and walking and at rest from 7/10 to 3/10.  2. Patient to demonstrate increased right ankle ROM: actively from -12 degrees dorsiflexion to 5 degrees past neutral dorsiflexion with improved walking pattern and less pain actively.   3. Patient to demonstrate tolerance of MMT with strength from 3-/5 with pain to ankle df 4/5, inversion/eversion 4/5 with improved stability with walking, negotiating stair steps. 4. Patient to demonstrate increased balance in tandem stance from 10-12 seconds to 30 seconds, SLS from 0 seconds to 10 seconds. Long Term Goals:  Time Frame: 8 weeks  1. Patient to negotiate stair steps from non reciprocal to reciprocal using single handrail. 2. Patient to demonstrate ambulation without gait deviations with equal wt shift and step length and no antalgic gait. 3. Patient to demonstrate independence in HEP with follow through with ROM, strength, balance and gait training ex/activities. Patient Education:   [x]  HEP/Education Completed: AROM for HEP with handout provided, walking pattern heel to toe, ice and elvate   Medbridge Access Code:  [email or web address]  []  No new Education completed  []  Reviewed Prior HEP      []  Patient verbalized and/or demonstrated understanding of education provided. []  Patient unable to verbalize and/or demonstrate understanding of education provided. Will continue education. []  Barriers to learning: none    PLAN:  Treatment Recommendations: Strengthening, Range of Motion, Balance Training, Functional Mobility Training, Transfer Training, Gait Training, Stair Training, Neuromuscular Re-education, Pain Management, Home Exercise Program, Patient Education, Aquatics and Modalities    [x]  Plan of care initiated. Plan to see patient 2 times per week for 8 weeks to address the treatment planned outlined above.   []  Continue with current plan of care  []  Modify plan of care as follows:    []  Hold pending physician visit  []  Discharge    Time In 1100   Time Out 1130   Timed Code Minutes: 30 min   Total Treatment Time: 30 min       Electronically Signed by: Adams Houston PTA

## 2021-09-17 ENCOUNTER — HOSPITAL ENCOUNTER (OUTPATIENT)
Dept: PHYSICAL THERAPY | Age: 61
Setting detail: THERAPIES SERIES
Discharge: HOME | End: 2021-09-17
Payer: MEDICARE

## 2021-09-17 PROCEDURE — 97110 THERAPEUTIC EXERCISES: CPT

## 2021-09-17 NOTE — PROGRESS NOTES
[facility] [facility]  PHYSICAL THERAPY  [] EVALUATION  [x] DAILY NOTE (LAND) [] DAILY NOTE (AQUATIC ) [] PROGRESS NOTE [] DISCHARGE NOTE    [x] OUTPATIENT REHABILITATION [facility]   [] [place] [address]    [] [facility]   [] Portia Alberta    Date: 2021  Patient Name:  Baldemar Woods  : 1960  MRN: [id number]  CSN: [id number]    Referring Practitioner LUMA Cooper*   Diagnosis Displaced bimalleolar fracture of right lower leg, subsequent encounter for closed fracture with routine healing [S82.841D]    Treatment Diagnosis S/p right ankle fracture with ORIF with difficulty walking, decreased balance, pain, decreased strength   Date of Evaluation 9/10/21    Additional Pertinent History Right displaced bimalleolar fracture 21, back surgery , arthritis, asthma, COPD, DM, HTN, CAD      Functional Outcome Measure Used FAAM   Functional Outcome Score 48/84 (9/10/21)       Insurance: Primary: Payor: Dinesh Nathan /  /  / ,   Secondary: MEDICAID [place]   Authorization Information: PRE CERTIFICATION REQUIRED: NO  INSURANCE THERAPY BENEFIT:  NO VISIT LIMIT   AQUATIC THERAPY COVERED: YES  MODALITIES COVERED:  YES  TELEHEALTH COVERED:    REFERENCE NUMBER: [id number] [place] [address]   Visit # 3, 3/10 for progress note   Visits Allowed: No visit limit   Recertification Date:    Physician Follow-Up: 2021   Physician Orders: eval and treat for 6 weeks   History of Present Illness: Patient fracture ankle 2021. Was walking down step and fell on right ankle. Surgery on 2021. Patient states ankle has been immobilized for past 4 months with cast for 2 month, then walking boot, and now in ASO ankle support. SUBJECTIVE:  Patient states she was sore after her last therapy session. Reports she is working on home exercises twice a day. Continues to wear ASO brace. TREATMENT   Precautions: Wears ASO ankle support for wb activities.     Pain: 7/10 Right ankle    X in shaded column indicates activity completed today   Modalities Parameters/  Location  Notes                     Manual Therapy Time/Technique  Notes   Soft tissue mobilization to medial Left ankle x5 minutes X Patient very tender and tight               Exercise/Intervention   Notes   Active ankle dorsiflexion/plantarflexion, inversion/eversion, circles clockwise/counter-clockwise 10x  X    Alphabet 1x Right  X    Seated rockerboard 2 directions 15x  X    Seated small wooden baps board circles clockwise/counter-clockwise 10x  X    Seated heel/toe raises 15x  X    Dorsiflexion stretch with towel  3x 15 sec X           Matrix bike (seat 6) - slow pace Level 1 3 minutes X Patient not able to tolerate longer time due to pain          Reviewed use of ice and elevation for pain and swelling   X    Gait training: heel to toe pattern with walking   X                                                              Specific Interventions Next Treatment: ankle ROM, seated rockerboard, small mini disc (BAPS), progress to strengthening, balance and standing ex with closed chain through RLE, Stat. Bicycle. Activity/Treatment Tolerance:   [x]  Patient tolerated treatment well  []  Patient limited by fatigue  [x]  Patient limited by pain   []  Patient limited by medical complications  []  Other:     Assessment:  Added towel stretch today with patient tolerating fairly well. Limited progression of exercises due to elevated pain levels. Instructed patient to work on soft tissue work at home for scar massage and mobility and to decrease sensitivity. GOALS:  Patient Goal: To have less pain and swelling at her right ankle. Improve her walking, balance. Short Term Goals:  Time Frame: 4 weeks  1.  Patient to report of decreased right ankle pain with wb standing and walking and at rest from 7/10 to 3/10.  2. Patient to demonstrate increased right ankle ROM: actively from -12 degrees dorsiflexion to 5 degrees past neutral dorsiflexion with improved walking pattern and less pain actively. 3. Patient to demonstrate tolerance of MMT with strength from 3-/5 with pain to ankle df 4/5, inversion/eversion 4/5 with improved stability with walking, negotiating stair steps. 4. Patient to demonstrate increased balance in tandem stance from 10-12 seconds to 30 seconds, SLS from 0 seconds to 10 seconds. Long Term Goals:  Time Frame: 8 weeks  1. Patient to negotiate stair steps from non reciprocal to reciprocal using single handrail. 2. Patient to demonstrate ambulation without gait deviations with equal wt shift and step length and no antalgic gait. 3. Patient to demonstrate independence in HEP with follow through with ROM, strength, balance and gait training ex/activities. Patient Education:   [x]  HEP/Education Completed: AROM for HEP with handout provided, walking pattern heel to toe, ice and elevate. Soft tissue mobility   [facility] Access Code:  [email or web address]  []  No new Education completed  []  Reviewed Prior HEP      []  Patient verbalized and/or demonstrated understanding of education provided. []  Patient unable to verbalize and/or demonstrate understanding of education provided. Will continue education. []  Barriers to learning: none    PLAN:  Treatment Recommendations: Strengthening, Range of Motion, Balance Training, Functional Mobility Training, Transfer Training, Gait Training, Stair Training, Neuromuscular Re-education, Pain Management, Home Exercise Program, Patient Education, Aquatics and Modalities    []  Plan of care initiated. Plan to see patient 2 times per week for 8 weeks to address the treatment planned outlined above.   [x]  Continue with current plan of care  []  Modify plan of care as follows:    []  Hold pending physician visit  []  Discharge    Time In 1100   Time Out 1130   Timed Code Minutes: 30 min   Total Treatment Time: 30 min       Electronically Signed by: Didier Guerin PTA

## 2021-09-20 ENCOUNTER — HOSPITAL ENCOUNTER (OUTPATIENT)
Dept: PHYSICAL THERAPY | Age: 61
Setting detail: THERAPIES SERIES
Discharge: HOME | End: 2021-09-20
Payer: MEDICARE

## 2021-09-20 PROCEDURE — 97110 THERAPEUTIC EXERCISES: CPT

## 2021-09-20 NOTE — PROGRESS NOTES
[address] [facility]  PHYSICAL THERAPY  [] EVALUATION  [x] DAILY NOTE (LAND) [] DAILY NOTE (AQUATIC ) [] PROGRESS NOTE [] DISCHARGE NOTE    [x] OUTPATIENT REHABILITATION [facility]   [] [place] [address]    [] [facility]   [] Alber Carey    Date: 2021  Patient Name:  Noe Bolden  : 1960  MRN: [id number]  CSN: [id number]    Referring Practitioner LUMA Martinez*   Diagnosis Displaced bimalleolar fracture of right lower leg, subsequent encounter for closed fracture with routine healing [S82.841D]    Treatment Diagnosis S/p right ankle fracture with ORIF with difficulty walking, decreased balance, pain, decreased strength   Date of Evaluation 9/10/21    Additional Pertinent History Right displaced bimalleolar fracture 21, back surgery , arthritis, asthma, COPD, DM, HTN, CAD      Functional Outcome Measure Used FAAM   Functional Outcome Score 48/84 (9/10/21)       Insurance: Primary: Payor: Brenton Maher /  /  / ,   Secondary: MEDICAID [place]   Authorization Information: PRE CERTIFICATION REQUIRED: NO  INSURANCE THERAPY BENEFIT:  NO VISIT LIMIT   AQUATIC THERAPY COVERED: YES  MODALITIES COVERED:  YES  TELEHEALTH COVERED:    REFERENCE NUMBER: [id number] [place] [address]   Visit # 4, 4/10 for progress note   Visits Allowed: No visit limit   Recertification Date: 3265   Physician Follow-Up: 2021   Physician Orders: eval and treat for 6 weeks   History of Present Illness: Patient fracture ankle 2021. Was walking down step and fell on right ankle. Surgery on 2021. Patient states ankle has been immobilized for past 4 months with cast for 2 month, then walking boot, and now in ASO ankle support. SUBJECTIVE: Patient reports of continued right ankle soreness around ankle and forefoot region. Continues ex 2x per day. Pain level 7/10. Continues to wear ankle support, apply ice and elevate.      TREATMENT   Precautions: Wears ASO ankle support for wb right ankle. GOALS:  Patient Goal: To have less pain and swelling at her right ankle. Improve her walking, balance. Short Term Goals:  Time Frame: 4 weeks  1. Patient to report of decreased right ankle pain with wb standing and walking and at rest from 7/10 to 3/10.  2. Patient to demonstrate increased right ankle ROM: actively from -12 degrees dorsiflexion to 5 degrees past neutral dorsiflexion with improved walking pattern and less pain actively. 3. Patient to demonstrate tolerance of MMT with strength from 3-/5 with pain to ankle df 4/5, inversion/eversion 4/5 with improved stability with walking, negotiating stair steps. 4. Patient to demonstrate increased balance in tandem stance from 10-12 seconds to 30 seconds, SLS from 0 seconds to 10 seconds. Long Term Goals:  Time Frame: 8 weeks  1. Patient to negotiate stair steps from non reciprocal to reciprocal using single handrail. 2. Patient to demonstrate ambulation without gait deviations with equal wt shift and step length and no antalgic gait. 3. Patient to demonstrate independence in HEP with follow through with ROM, strength, balance and gait training ex/activities. Patient Education: Continue to ice and elevate. ROM ex 2x per day. Continue to wear ASO as directed by physician.  []  HEP/Education Completed:   Destiny Rai Access Code:  [email or web address]  []  No new Education completed  [x]  Reviewed Prior HEP      [x]  Patient verbalized and/or demonstrated understanding of education provided. []  Patient unable to verbalize and/or demonstrate understanding of education provided. Will continue education. []  Barriers to learning: none    PLAN:  Treatment Recommendations: Strengthening, Range of Motion, Balance Training, Functional Mobility Training, Transfer Training, Gait Training, Stair Training, Neuromuscular Re-education, Pain Management, Home Exercise Program, Patient Education, Aquatics and Modalities    []  Plan of care initiated. Plan to see patient 2 times per week for 8 weeks to address the treatment planned outlined above.   [x]  Continue with current plan of care  []  Modify plan of care as follows:    []  Hold pending physician visit  []  Discharge    Time In 1050   Time Out 1118   Timed Code Minutes: 28   Total Treatment Time: 28       Electronically Signed by: Janice Saravia PT

## 2021-09-22 ENCOUNTER — APPOINTMENT (OUTPATIENT)
Dept: PHYSICAL THERAPY | Age: 61
End: 2021-09-22
Payer: MEDICARE

## 2021-09-27 ENCOUNTER — HOSPITAL ENCOUNTER (OUTPATIENT)
Dept: PHYSICAL THERAPY | Age: 61
Setting detail: THERAPIES SERIES
Discharge: HOME | End: 2021-09-27
Payer: MEDICARE

## 2021-09-27 PROCEDURE — 97110 THERAPEUTIC EXERCISES: CPT

## 2021-09-27 NOTE — PROGRESS NOTES
[address] [facility]  PHYSICAL THERAPY  [] EVALUATION  [x] DAILY NOTE (LAND) [] DAILY NOTE (AQUATIC ) [] PROGRESS NOTE [] DISCHARGE NOTE    [x] OUTPATIENT REHABILITATION CENTER [facility]   [] [facility]    [] [facility]   [] Alexia Opitz    Date: 2021  Patient Name:  Mu Gutiérrez  : 1960  MRN: [id number]  CSN: [id number]    Referring Practitioner Brown Libman, APRN - C*   Diagnosis Displaced bimalleolar fracture of right lower leg, subsequent encounter for closed fracture with routine healing [S82.841D]    Treatment Diagnosis S/p right ankle fracture with ORIF with difficulty walking, decreased balance, pain, decreased strength   Date of Evaluation 9/10/21    Additional Pertinent History Right displaced bimalleolar fracture 21, back surgery , arthritis, asthma, COPD, DM, HTN, CAD      Functional Outcome Measure Used FAAM   Functional Outcome Score 48/84 (9/10/21)       Insurance: Primary: Payor: Chiquita Bernal /  /  / ,   Secondary: MEDICAID [place]   Authorization Information: PRE CERTIFICATION REQUIRED: NO  INSURANCE THERAPY BENEFIT:  NO VISIT LIMIT   AQUATIC THERAPY COVERED: YES  MODALITIES COVERED:  YES  TELEHEALTH COVERED:    REFERENCE NUMBER: [id number]   Visit # 5, 5/10 for progress note   Visits Allowed: No visit limit   Recertification Date:    Physician Follow-Up: 2021   Physician Orders: eval and treat for 6 weeks   History of Present Illness: Patient fracture ankle 2021. Was walking down step and fell on right ankle. Surgery on 2021. Patient states ankle has been immobilized for past 4 months with cast for 2 month, then walking boot, and now in ASO ankle support. SUBJECTIVE: Patient states she is working on exercises at home and continues to use ice. Wearing ASO brace. Patient has a follow up doctor tomorrow. TREATMENT   Precautions: Wears ASO ankle support for wb activities.     Pain: 7/10 Right ankle    X in shaded demonstrate increased right ankle ROM: actively from -12 degrees dorsiflexion to 5 degrees past neutral dorsiflexion with improved walking pattern and less pain actively. 3. Patient to demonstrate tolerance of MMT with strength from 3-/5 with pain to ankle df 4/5, inversion/eversion 4/5 with improved stability with walking, negotiating stair steps. 4. Patient to demonstrate increased balance in tandem stance from 10-12 seconds to 30 seconds, SLS from 0 seconds to 10 seconds. Long Term Goals:  Time Frame: 8 weeks  1. Patient to negotiate stair steps from non reciprocal to reciprocal using single handrail. 2. Patient to demonstrate ambulation without gait deviations with equal wt shift and step length and no antalgic gait. 3. Patient to demonstrate independence in HEP with follow through with ROM, strength, balance and gait training ex/activities. Patient Education:  [x]  HEP/Education Completed:  Elevate and ice. Continue to wear ASO as directed by physician   [address] Access Code:  [email or web address]  []  No new Education completed  [x]  Reviewed Prior HEP      [x]  Patient verbalized and/or demonstrated understanding of education provided. []  Patient unable to verbalize and/or demonstrate understanding of education provided. Will continue education. []  Barriers to learning: none    PLAN:  Treatment Recommendations: Strengthening, Range of Motion, Balance Training, Functional Mobility Training, Transfer Training, Gait Training, Stair Training, Neuromuscular Re-education, Pain Management, Home Exercise Program, Patient Education, Aquatics and Modalities    []  Plan of care initiated. Plan to see patient 2 times per week for 8 weeks to address the treatment planned outlined above.   [x]  Continue with current plan of care  []  Modify plan of care as follows:    []  Hold pending physician visit  []  Discharge     Time In 1100   Time Out 1130   Timed Code Minutes: 30 min   Total Treatment Time: 30 min

## 2021-09-29 ENCOUNTER — HOSPITAL ENCOUNTER (OUTPATIENT)
Dept: PHYSICAL THERAPY | Age: 61
Setting detail: THERAPIES SERIES
Discharge: HOME | End: 2021-09-29
Payer: MEDICARE

## 2021-09-29 PROCEDURE — 97110 THERAPEUTIC EXERCISES: CPT

## 2021-09-29 NOTE — PROGRESS NOTES
[facility]  PHYSICAL THERAPY  [] EVALUATION  [x] DAILY NOTE (LAND) [] DAILY NOTE (AQUATIC ) [] PROGRESS NOTE [] DISCHARGE NOTE    [x] OUTPATIENT REHABILITATION [facility]   [] [place] [address]    [] [facility]   [] Wong Liriano    Date: 2021  Patient Name:  Michael Gutiérrez  : 1960  MRN: [id number]  CSN: [id number]    Referring Practitioner LUMA Beavers*   Diagnosis Displaced bimalleolar fracture of right lower leg, subsequent encounter for closed fracture with routine healing [S82.841D]    Treatment Diagnosis S/p right ankle fracture with ORIF with difficulty walking, decreased balance, pain, decreased strength   Date of Evaluation 9/10/21    Additional Pertinent History Right displaced bimalleolar fracture 21, back surgery , arthritis, asthma, COPD, DM, HTN, CAD      Functional Outcome Measure Used FAAM   Functional Outcome Score 48/84 (9/10/21)   32/84 (21)       Insurance: Primary: Payor: Corin Juárez /  /  / ,   Secondary: MEDICA[place]   Authorization Information: PRE CERTIFICATION REQUIRED: NO  INSURANCE THERAPY BENEFIT:  NO VISIT LIMIT   AQUATIC THERAPY COVERED: YES  MODALITIES COVERED:  YES  TELEHEALTH COVERED:    REFERENCE NUMBER: [id number]   Visit # 6, 0/6  for progress note   Visits Allowed: No visit limit   Recertification Date:    Physician Follow-Up: 2021   Physician Orders: eval and treat for 6 weeks   History of Present Illness: Patient fracture ankle 2021. Was walking down step and fell on right ankle. Surgery on 2021. Patient states ankle has been immobilized for past 4 months with cast for 2 month, then walking boot, and now in ASO ankle support. SUBJECTIVE: Patient had follow up appointment with physician yesterday. Brought in orders to continue with PT. Patient reports of continued pain and swelling at right ankle. Wears ASO ankle support right ankle.  Notes improved movement of right Reassessment today for PT. Refer to goal summary for status. Active ROM has improved. Continue to note limited strength. We have been able to progress to closed chain strengthening and balance with ASO ankle support on RLE. Patient continues to ice for pain and swelling. Gait deviations are less with improved heel to toe contact with walking but pain/antalgic gait at times. Will continue to follow 2x per week for addressing balance, ROM, strength and walking pattern. GOALS:  Patient Goal: To have less pain and swelling at her right ankle. Improve her walking, balance. Short Term Goals:  Time Frame: 4 weeks  1. Patient to report of decreased right ankle pain with wb standing and walking and at rest from 7/10 to 3/10. GOAL NOT MET Pain level remains 7/10 right ankle. 2. Patient to demonstrate increased right ankle ROM: actively from -12 degrees dorsiflexion to 5 degrees past neutral dorsiflexion with improved walking pattern and less pain actively. GOAL NOT MET right ankle ROM: actively gained 12 degrees to neutral position. Limited 5 degrees from neutral.    3. Patient to demonstrate tolerance of MMT with strength from 3-/5 with pain to ankle df 4/5, inversion/eversion 4/5 with improved stability with walking, negotiating stair steps. GOAL NOT MET Pain with AROM too painful for MMT. 4. Patient to demonstrate increased balance in tandem stance from 10-12 seconds to 30 seconds, SLS from 0 seconds to 10 seconds. GOAL NOT MET tandem stance goal met for 30 seconds, SLS goal not met 0 seconds. Long Term Goals:  Time Frame: 8 weeks  1. Patient to negotiate stair steps from non reciprocal to reciprocal using single handrail. GOAL NOT MET continues to negotiated steps non reciprocally. 2. Patient to demonstrate ambulation without gait deviations with equal wt shift and step length and no antalgic gait. GOAL NOT MET Continue to note decreased wt shift through RLE and mild antalgic gait.   Noting improved heel to toe pattern. 3. Patient to demonstrate independence in HEP with follow through with ROM, strength, balance and gait training ex/activities. ONGOING       Patient Education:  [x]  HEP/Education Completed:  Elevate and ice. Continue to wear ASO as directed by physician   [address] Access Code:  [email or web address]  []  No new Education completed  [x]  Reviewed Prior HEP      [x]  Patient verbalized and/or demonstrated understanding of education provided. []  Patient unable to verbalize and/or demonstrate understanding of education provided. Will continue education. []  Barriers to learning: none    PLAN:  Treatment Recommendations: Strengthening, Range of Motion, Balance Training, Functional Mobility Training, Transfer Training, Gait Training, Stair Training, Neuromuscular Re-education, Pain Management, Home Exercise Program, Patient Education, Aquatics and Modalities    []  Plan of care initiated. Plan to see patient 2 times per week for 8 weeks to address the treatment planned outlined above.   [x]  Continue with current plan of care  []  Modify plan of care as follows:    []  Hold pending physician visit  []  Discharge     Time In 1045   Time Out 1119   Timed Code Minutes: 34   Total Treatment Time: 34       Electronically Signed by: Trevor López PT

## 2021-10-04 ENCOUNTER — HOSPITAL ENCOUNTER (OUTPATIENT)
Dept: PHYSICAL THERAPY | Age: 61
Setting detail: THERAPIES SERIES
Discharge: HOME | End: 2021-10-04
Payer: MEDICARE

## 2021-10-04 PROCEDURE — 97110 THERAPEUTIC EXERCISES: CPT

## 2021-10-04 NOTE — PROGRESS NOTES
[facility]  PHYSICAL THERAPY  [] EVALUATION  [x] DAILY NOTE (LAND) [] DAILY NOTE (AQUATIC ) [] PROGRESS NOTE [] DISCHARGE NOTE    [x] OUTPATIENT REHABILITATION [facility]   [] [place] [address]    [] [facility]   [] Margariot Turk    Date: 10/4/2021  Patient Name:  Juventino Lennon  : 1960  MRN: [id number]  CSN: [id number]    Referring Practitioner LUMA Donnelly*   Diagnosis Displaced bimalleolar fracture of right lower leg, subsequent encounter for closed fracture with routine healing [S82.841D]    Treatment Diagnosis S/p right ankle fracture with ORIF with difficulty walking, decreased balance, pain, decreased strength   Date of Evaluation 9/10/21    Additional Pertinent History Right displaced bimalleolar fracture 21, back surgery , arthritis, asthma, COPD, DM, HTN, CAD      Functional Outcome Measure Used FAAM   Functional Outcome Score 48/84 (9/10/21)   32/84 (21)       Insurance: Primary: Payor: Dontrell Lima /  /  / ,   Secondary: MEDICAID [place]   Authorization Information: PRE CERTIFICATION REQUIRED: NO  INSURANCE THERAPY BENEFIT:  NO VISIT LIMIT   AQUATIC THERAPY COVERED: YES  MODALITIES COVERED:  YES  TELEHEALTH COVERED:    REFERENCE NUMBER: [id number]   Visit # 7, 1  for progress note   Visits Allowed: No visit limit   Recertification Date: 22/3/5254   Physician Follow-Up: 2021   Physician Orders: eval and treat for 6 weeks   History of Present Illness: Patient fracture ankle 2021. Was walking down step and fell on right ankle. Surgery on 2021. Patient states ankle has been immobilized for past 4 months with cast for 2 month, then walking boot, and now in ASO ankle support. SUBJECTIVE:  Patient states she was a little sore after her last therapy appointment. Swelling fluctuates from day to day. TREATMENT   Precautions: Wears ASO ankle support for wb activities.     Pain: 6/10 Right ankle    X in shaded column indicates activity completed today   Modalities Parameters/  Location  Notes   REASSESSMENT    Refer to goal summary for status. Manual Therapy Time/Technique  Notes   Soft tissue mobilization to medial Left ankle x5 minutes  Patient very tender and tight               Exercise/Intervention   Notes   Active ankle dorsiflexion/plantarflexion, inversion/eversion, circles clockwise/counter-clockwise 10x  X    Alphabet 1x Right  X Difficulty isolating ankle movement from leg   Seated rockerboard 2 directions 20x  X    Seated small wooden baps board circles clockwise/counter-clockwise 15x  X Fwd/back today, increased pain with circles. Seated heel/toe raises 15x  X    Dorsiflexion, inversion and eversion stretches with towel  3x each 20 sec            Matrix bike (seat 6) - slow pace Level 1 3 minutes  Held on 9/20/21 due to elevated pain rating and increasing pain last session. Balance on Blue foam:  Narrow stance, step stance (bilateral lead)  1 minute  Wearing ASO   Tandem stance on floor  30 seconds X Wearing ASO          Standing with UE support:        Heel raises 10x  X    Marches  15x  X Wearing ASO support for standing closed chain ex. 3 way hip closed chain through RLE 15x  X    Wt shift forward/ back through forefoot/ heel and lateral weight shifting 10x each  X                         Reviewed use of ice and elevation for pain and swelling              Gait training: heel to toe pattern with walking   X             Specific Interventions Next Treatment: ankle ROM, seated rockerboard, small mini disc (BAPS), progress to strengthening, balance and standing ex with closed chain through RLE, Stat. Bicycle.      Activity/Treatment Tolerance:   [x]  Patient tolerated treatment well  []  Patient limited by fatigue  [x]  Patient limited by pain   []  Patient limited by medical complications  []  Other:     Assessment:  Continued rockerboard and baps board in seated position due to increased soreness after standing exercises. Patient was able to tolerate increased repetitions today. Patient was slightly more sore with standing exercises but continued to rate pain 6/10 at conclusion. GOALS:  Patient Goal: To have less pain and swelling at her right ankle. Improve her walking, balance. Short Term Goals:  Time Frame: 4 weeks  1. Patient to report of decreased right ankle pain with wb standing and walking and at rest from 7/10 to 3/10.    2. Patient to demonstrate increased right ankle ROM: actively from -12 degrees dorsiflexion to 5 degrees past neutral dorsiflexion with improved walking pattern and less pain actively. 3. Patient to demonstrate tolerance of MMT with strength from 3-/5 with pain to ankle df 4/5, inversion/eversion 4/5 with improved stability with walking, negotiating stair steps. 4. Patient to demonstrate increased balance in tandem stance from 10-12 seconds to 30 seconds, SLS from 0 seconds to 10 seconds. Long Term Goals:  Time Frame: 8 weeks  1. Patient to negotiate stair steps from non reciprocal to reciprocal using single handrail. 2. Patient to demonstrate ambulation without gait deviations with equal wt shift and step length and no antalgic gait. 3. Patient to demonstrate independence in HEP with follow through with ROM, strength, balance and gait training ex/activities. Patient Education:  []  HEP/Education Completed:  Elevate and ice. Continue to wear ASO as directed by physician   [address] Access Code:  [email or web address]  []  No new Education completed  [x]  Reviewed Prior HEP      [x]  Patient verbalized and/or demonstrated understanding of education provided. []  Patient unable to verbalize and/or demonstrate understanding of education provided. Will continue education.   []  Barriers to learning: none    PLAN:  Treatment Recommendations: Strengthening, Range of Motion, Balance Training, Functional Mobility Training, Transfer Training, Gait Training, Stair Training, Neuromuscular Re-education, Pain Management, Home Exercise Program, Patient Education, Aquatics and Modalities    []  Plan of care initiated. Plan to see patient 2 times per week for 8 weeks to address the treatment planned outlined above.   [x]  Continue with current plan of care  []  Modify plan of care as follows:    []  Hold pending physician visit  []  Discharge     Time In 1500   Time Out 1530   Timed Code Minutes: 30 min   Total Treatment Time: 30 min       Electronically Signed by: Richard Palma PTA

## 2021-10-06 ENCOUNTER — HOSPITAL ENCOUNTER (OUTPATIENT)
Dept: PHYSICAL THERAPY | Age: 61
Setting detail: THERAPIES SERIES
Discharge: HOME | End: 2021-10-06
Payer: MEDICARE

## 2021-10-06 PROCEDURE — 97110 THERAPEUTIC EXERCISES: CPT

## 2021-10-06 NOTE — PROGRESS NOTES
[address] [facility]  PHYSICAL THERAPY  [] EVALUATION  [x] DAILY NOTE (LAND) [] DAILY NOTE (AQUATIC ) [] PROGRESS NOTE [] DISCHARGE NOTE    [x] OUTPATIENT REHABILITATION CENTER [facility]   [] [place] [address]    [] [facility]   [] Juan Worrellar    Date: 10/6/2021  Patient Name:  Nicole Shipley  : 1960  MRN: [id number]  CSN: [id number]    Referring Practitioner LUMA Allen*   Diagnosis Displaced bimalleolar fracture of right lower leg, subsequent encounter for closed fracture with routine healing [S82.841D]    Treatment Diagnosis S/p right ankle fracture with ORIF with difficulty walking, decreased balance, pain, decreased strength   Date of Evaluation 9/10/21    Additional Pertinent History Right displaced bimalleolar fracture 21, back surgery , arthritis, asthma, COPD, DM, HTN, CAD      Functional Outcome Measure Used FAAM   Functional Outcome Score 48/84 (9/10/21)   32/84 (21)       Insurance: Primary: Payor: Frandy Paz /  /  / ,   Secondary: MEDICAID [place]   Authorization Information: PRE CERTIFICATION REQUIRED: NO  INSURANCE THERAPY BENEFIT:  NO VISIT LIMIT   AQUATIC THERAPY COVERED: YES  MODALITIES COVERED:  YES  TELEHEALTH COVERED:    REFERENCE NUMBER: [id number]   Visit # 8, 2  for progress note   Visits Allowed: No visit limit   Recertification Date:    Physician Follow-Up: 2021   Physician Orders: eval and treat for 6 weeks   History of Present Illness: Patient fracture ankle 2021. Was walking down step and fell on right ankle. Surgery on 2021. Patient states ankle has been immobilized for past 4 months with cast for 2 month, then walking boot, and now in ASO ankle support. SUBJECTIVE: Patient reports that she continues to have 6/10 pain at her right ankle. She reports compliance with her HEP. TREATMENT   Precautions: Wears ASO ankle support for wb activities.     Pain: 6/10 Right ankle    X in shaded column indicates activity completed today   Modalities Parameters/  Location  Notes   REASSESSMENT    Refer to goal summary for status. Manual Therapy Time/Technique  Notes   Soft tissue mobilization to medial Left ankle x5 minutes  Patient very tender and tight               Exercise/Intervention   Notes   Active ankle dorsiflexion/plantarflexion, inversion/eversion, circles clockwise/counter-clockwise 10-15x  X    Alphabet 1x Right  X Difficulty isolating ankle movement from leg   Seated rockerboard 2 directions 20x  X    Seated small wooden baps board circles clockwise/counter-clockwise 15x  X Fwd/back today, patient had difficult time completing circles correctly. Seated heel/toe raises 15x  X    Dorsiflexion, inversion and eversion stretches with towel  3x each 20 sec            Matrix bike (seat 6) - slow pace Level 1 3 minutes  Held on 9/20/21 due to elevated pain rating and increasing pain last session. Balance on Blue foam:  Narrow stance, step stance (bilateral lead)  1 minute  Wearing ASO   Tandem stance on floor  30 seconds X Wearing ASO          Standing with UE support:        Heel raises 15x  X    Marches  15x  X Wearing ASO support for standing closed chain ex. 3 way hip closed chain through RLE 15x  X Difficult time with no UE at support, frequent use of //bars. Mini squats  10x  X Cues for even weight shifting. Wt shift forward/ back through forefoot/ heel and lateral weight shifting 10x each  X    Dynamic gait: tandem walking, retro walking, side stepping  1 lap ea  Hallway  X (1 lap= down and back)                  Reviewed use of ice and elevation for pain and swelling              Gait training: heel to toe pattern with walking   X             Specific Interventions Next Treatment: ankle ROM, seated rockerboard, small mini disc (BAPS), progress to strengthening, balance and standing ex with closed chain through RLE, Stat. Bicycle.  Mini squats, dynamic gait, hydro ex/activities. Patient Education:  [x]  HEP/Education Completed: Added therapeutic exercises as documented above. Patient provided with handouts of updated HEP. [employer] Access Code:  [email or web address]  []  No new Education completed  [x]  Reviewed Prior HEP      [x]  Patient verbalized and/or demonstrated understanding of education provided. []  Patient unable to verbalize and/or demonstrate understanding of education provided. Will continue education. []  Barriers to learning: none    PLAN:  Treatment Recommendations: Strengthening, Range of Motion, Balance Training, Functional Mobility Training, Transfer Training, Gait Training, Stair Training, Neuromuscular Re-education, Pain Management, Home Exercise Program, Patient Education, Aquatics and Modalities    []  Plan of care initiated. Plan to see patient 2 times per week for 8 weeks to address the treatment planned outlined above.   [x]  Continue with current plan of care  []  Modify plan of care as follows:    []  Hold pending physician visit  []  Discharge     Time In 0910   Time Out 0940   Timed Code Minutes: 30 min   Total Treatment Time: 30 min       Electronically Signed by: Negin Alston PTA

## 2021-10-11 ENCOUNTER — HOSPITAL ENCOUNTER (OUTPATIENT)
Dept: PHYSICAL THERAPY | Age: 61
Setting detail: THERAPIES SERIES
Discharge: HOME | End: 2021-10-11
Payer: MEDICARE

## 2021-10-11 PROCEDURE — 97110 THERAPEUTIC EXERCISES: CPT

## 2021-10-11 NOTE — PROGRESS NOTES
[facility]  PHYSICAL THERAPY  [] EVALUATION  [x] DAILY NOTE (LAND) [] DAILY NOTE (AQUATIC ) [] PROGRESS NOTE [] DISCHARGE NOTE    [x] OUTPATIENT REHABILITATION CENTER [facility]   [] [place]     [] [facility]   [] Trenton Baird    Date: 10/11/2021  Patient Name:  Eduar Zelaya    : 1960  MRN: [id number]  CSN: [id number]     Referring Practitioner LUMA Palacio*   Diagnosis Displaced bimalleolar fracture of right lower leg, subsequent encounter for closed fracture with routine healing [S82.841D]    Treatment Diagnosis S/p right ankle fracture with ORIF with difficulty walking, decreased balance, pain, decreased strength   Date of Evaluation 9/10/21    Additional Pertinent History Right displaced bimalleolar fracture 21, back surgery , arthritis, asthma, COPD, DM, HTN, CAD      Functional Outcome Measure Used FAAM   Functional Outcome Score 48/84 (9/10/21)   32/84 (21)       Insurance: Primary: Payor: Sandrita White /  /  / ,   Secondary: MEDICAID [place]   Authorization Information: PRE CERTIFICATION REQUIRED: NO  INSURANCE THERAPY BENEFIT:  NO VISIT LIMIT   AQUATIC THERAPY COVERED: YES  MODALITIES COVERED:  YES  TELEHEALTH COVERED:    REFERENCE NUMBER: [id number]   Visit # 9, 3/6  for progress note   Visits Allowed: No visit limit   Recertification Date:    Physician Follow-Up: 2021   Physician Orders: eval and treat for 6 weeks   History of Present Illness: Patient fracture ankle 2021. Was walking down step and fell on right ankle. Surgery on 2021. Patient states ankle has been immobilized for past 4 months with cast for 2 month, then walking boot, and now in ASO ankle support. SUBJECTIVE: Patient reports that she is having \"a little bit of pain\". She rates her pain a 6/10. TREATMENT   Precautions: Wears ASO ankle support for wb activities.     Pain: 6/10 Right ankle    X in shaded column indicates activity completed today   Modalities Parameters/  Location  Notes   REASSESSMENT    Refer to goal summary for status. Manual Therapy Time/Technique  Notes   Soft tissue mobilization to medial Left ankle x5 minutes  Patient very tender and tight               Exercise/Intervention   Notes   Active ankle dorsiflexion/plantarflexion, inversion/eversion, circles clockwise/counter-clockwise 10-15x  X    Alphabet 1x Right  X Difficulty isolating ankle movement from leg   Seated rockerboard 2 directions 20x  X    Seated small wooden baps board circles clockwise/counter-clockwise 15x  X Fwd/back today, patient had difficult time completing circles correctly. Seated heel/toe raises 15x  X    Dorsiflexion, inversion and eversion stretches with towel  3x each 20 sec            Matrix bike (seat 6) - slow pace Level 1 3 minutes  Held on 9/20/21 due to elevated pain rating and increasing pain last session. Balance on Blue foam:  Narrow stance, step stance (bilateral lead)  1 minute  Wearing ASO   Tandem stance on floor  30 seconds X Wearing ASO          Standing with UE support:        Heel raises 15x  X    Marches  15x  X Wearing ASO support for standing closed chain ex. 3 way hip closed chain through RLE 15x  X Difficult time with no UE at support, frequent use of //bars. Mini squats  15x  X Cues for even weight shifting. Wt shift forward/ back through forefoot/ heel and lateral weight shifting 15x each  X    Dynamic gait: tandem walking, retro walking, side stepping.  Marches  1 lap ea  Hallway  X (1 lap= down and back)    Hydrostick x4 directions with step stance RLE posterior  10x each   X           Reviewed use of ice and elevation for pain and swelling              Gait training: heel to toe pattern with walking   X             Specific Interventions Next Treatment: ankle ROM, seated rockerboard, small mini disc (BAPS), progress to strengthening, balance and standing ex with closed chain through RLE, Stat. Bicycle. Mini squats, dynamic gait, hydro stick. Activity/Treatment Tolerance:   [x]  Patient tolerated treatment well  []  Patient limited by fatigue  [x]  Patient limited by pain   []  Patient limited by medical complications  []  Other:     Assessment: Patient completed therapeutic exercises as documented above with addition to dynamic marches and hydrostick exercises. She was provided with cues on proper technique with exercises to ensure maximal muscle activation. Cues also provided for isolated movement at her ankle while performing wooden wobble board exercises. She noted increased pain at her right ankle today while performing hip extension at her LLE. GOALS:  Patient Goal: To have less pain and swelling at her right ankle. Improve her walking, balance. Short Term Goals:  Time Frame: 4 weeks  1. Patient to report of decreased right ankle pain with wb standing and walking and at rest from 7/10 to 3/10.    2. Patient to demonstrate increased right ankle ROM: actively from -12 degrees dorsiflexion to 5 degrees past neutral dorsiflexion with improved walking pattern and less pain actively. 3. Patient to demonstrate tolerance of MMT with strength from 3-/5 with pain to ankle df 4/5, inversion/eversion 4/5 with improved stability with walking, negotiating stair steps. 4. Patient to demonstrate increased balance in tandem stance from 10-12 seconds to 30 seconds, SLS from 0 seconds to 10 seconds. Long Term Goals:  Time Frame: 8 weeks  1. Patient to negotiate stair steps from non reciprocal to reciprocal using single handrail. 2. Patient to demonstrate ambulation without gait deviations with equal wt shift and step length and no antalgic gait. 3. Patient to demonstrate independence in HEP with follow through with ROM, strength, balance and gait training ex/activities. Patient Education:  [x]  HEP/Education Completed:  Added dynamic marches and hydrostick exercises. [facility] Access Code:  [email or web address]  []  No new Education completed  [x]  Reviewed Prior HEP      [x]  Patient verbalized and/or demonstrated understanding of education provided. []  Patient unable to verbalize and/or demonstrate understanding of education provided. Will continue education. []  Barriers to learning: none  09  PLAN:  Treatment Recommendations: Strengthening, Range of Motion, Balance Training, Functional Mobility Training, Transfer Training, Gait Training, Stair Training, Neuromuscular Re-education, Pain Management, Home Exercise Program, Patient Education, Aquatics and Modalities    []  Plan of care initiated. Plan to see patient 2 times per week for 8 weeks to address the treatment planned outlined above.   [x]  Continue with current plan of care  []  Modify plan of care as follows:    []  Hold pending physician visit  []  Discharge     Time In 0900   Time Out 0929   Timed Code Minutes: 29 min   Total Treatment Time: 29 min       Electronically Signed by: Emma Iverson PTA

## 2021-10-13 ENCOUNTER — HOSPITAL ENCOUNTER (OUTPATIENT)
Dept: PHYSICAL THERAPY | Age: 61
Setting detail: THERAPIES SERIES
End: 2021-10-13
Payer: MEDICARE

## 2021-10-18 ENCOUNTER — HOSPITAL ENCOUNTER (OUTPATIENT)
Dept: PHYSICAL THERAPY | Age: 61
Setting detail: THERAPIES SERIES
Discharge: HOME | End: 2021-10-18
Payer: MEDICARE

## 2021-10-18 PROCEDURE — 97110 THERAPEUTIC EXERCISES: CPT

## 2021-10-18 NOTE — PROGRESS NOTES
[facility]  PHYSICAL THERAPY  [] EVALUATION  [x] DAILY NOTE (LAND) [] DAILY NOTE (AQUATIC ) [] PROGRESS NOTE [] DISCHARGE NOTE    [x] OUTPATIENT REHABILITATION [facility]   [] [place] [address]    [] [facility]   [] [place]    Date: 10/18/2021  Patient Name:  Mejia Cali    : 1960  MRN: [id number]  CSN: [id number]     Referring Practitioner LUMA Bangura*   Diagnosis Displaced bimalleolar fracture of right lower leg, subsequent encounter for closed fracture with routine healing [S82.841D]    Treatment Diagnosis S/p right ankle fracture with ORIF with difficulty walking, decreased balance, pain, decreased strength   Date of Evaluation 9/10/21    Additional Pertinent History Right displaced bimalleolar fracture 21, back surgery , arthritis, asthma, COPD, DM, HTN, CAD      Functional Outcome Measure Used FAAM   Functional Outcome Score 48/84 (9/10/21)   32/84 (21)       Insurance: Primary: Payor: Dotty Kind /  /  / ,   Secondary: [place]   Authorization Information: PRE CERTIFICATION REQUIRED: NO  INSURANCE THERAPY BENEFIT:  NO VISIT LIMIT   AQUATIC THERAPY COVERED: YES  MODALITIES COVERED:  YES  TELEHEALTH COVERED:    REFERENCE NUMBER: [id number]   Visit # 10, 4/6  for progress note   Visits Allowed: No visit limit   Recertification Date: 10/5/8464   Physician Follow-Up: 2021   Physician Orders: eval and treat for 6 weeks   History of Present Illness: Patient fracture ankle 2021. Was walking down step and fell on right ankle. Surgery on 2021. Patient states ankle has been immobilized for past 4 months with cast for 2 month, then walking boot, and now in ASO ankle support. SUBJECTIVE: Patient reports that she is having \"a little bit of pain\". She rates her pain a 5/10. Reports nothing new going on. TREATMENT   Precautions: Wears ASO ankle support for wb activities.     Pain: 5/10 Right ankle    X in shaded column indicates activity completed today   Modalities Parameters/  Location  Notes   REASSESSMENT    Refer to goal summary for status. Manual Therapy Time/Technique  Notes   Soft tissue mobilization to medial Left ankle x5 minutes  Patient very tender and tight               Exercise/Intervention   Notes   Active ankle dorsiflexion/plantarflexion, inversion/eversion, circles clockwise/counter-clockwise 10-15x      Alphabet 1x Right   Difficulty isolating ankle movement from leg   Seated rockerboard 2 directions 20x      Seated small wooden baps board circles clockwise/counter-clockwise 15x   Fwd/back today, patient had difficult time completing circles correctly. Seated heel/toe raises 15x      Dorsiflexion, inversion and eversion stretches with towel  3x each 20 sec            Matrix bike (seat 6) - slow pace Level 1 3 minutes  Held on 9/20/21 due to elevated pain rating and increasing pain last session. Balance on Blue foam:  Narrow stance, step stance (bilateral lead)  1 minute  Wearing ASO   Tandem stance on floor  30 seconds X Wearing ASO          Standing with UE support:        Heel raises 15x  X    Marches  15x  X Wearing ASO support for standing closed chain ex. 3 way hip closed chain through RLE 15x  X Difficult time with no UE at support, frequent use of //bars. Mini squats  15x  X Cues for even weight shifting. Rockerboard side to side and front to back 10x Balance 30 sec X    Baps level 1 front to back, side to side, clockwise, and counterclockwise 10x  X           Wt shift forward/ back through forefoot/ heel and lateral weight shifting 15x each      Dynamic gait: tandem walking, retro walking, side stepping.  Marches  1 lap ea  Hallway  X (1 lap= down and back)    Hydrostick x4 directions with step stance RLE posterior  10x each   X Pain increased to a 7/10 so only completed front to back and side to side   Balance with feet side by side, step stance, tandem stance 30 sec each  X    Reviewed use of ice and elevation for pain and swelling              Gait training: heel to toe pattern with walking   X             Specific Interventions Next Treatment: ankle ROM, seated rockerboard, small mini disc (BAPS), progress to strengthening, balance and standing ex with closed chain through RLE, Stat. Bicycle. Mini squats, dynamic gait, hydro stick. Activity/Treatment Tolerance:   [x]  Patient tolerated treatment well  []  Patient limited by fatigue  [x]  Patient limited by pain   []  Patient limited by medical complications  []  Other:     Assessment: Patient asked to complete new exercises today so held all seated exercises for at home. Did have some increased pain when completing hydrostick and sat down after. At end of session Grace asked if she was supposed to return to therapy and therapist reviewed when her next appointment was. GOALS:  Patient Goal: To have less pain and swelling at her right ankle. Improve her walking, balance. Short Term Goals:  Time Frame: 4 weeks  1. Patient to report of decreased right ankle pain with wb standing and walking and at rest from 7/10 to 3/10.    2. Patient to demonstrate increased right ankle ROM: actively from -12 degrees dorsiflexion to 5 degrees past neutral dorsiflexion with improved walking pattern and less pain actively. 3. Patient to demonstrate tolerance of MMT with strength from 3-/5 with pain to ankle df 4/5, inversion/eversion 4/5 with improved stability with walking, negotiating stair steps. 4. Patient to demonstrate increased balance in tandem stance from 10-12 seconds to 30 seconds, SLS from 0 seconds to 10 seconds. Long Term Goals:  Time Frame: 8 weeks  1. Patient to negotiate stair steps from non reciprocal to reciprocal using single handrail. 2. Patient to demonstrate ambulation without gait deviations with equal wt shift and step length and no antalgic gait.     3. Patient to demonstrate independence in HEP with follow through with ROM, strength, balance and gait training ex/activities. Patient Education:  [x]  HEP/Education Completed: Rockerboard and baps in standing   [facility] Access Code:  [email or web address]  []  No new Education completed  [x]  Reviewed Prior HEP      [x]  Patient verbalized and/or demonstrated understanding of education provided. []  Patient unable to verbalize and/or demonstrate understanding of education provided. Will continue education. []  Barriers to learning: none  09  PLAN:  Treatment Recommendations: Strengthening, Range of Motion, Balance Training, Functional Mobility Training, Transfer Training, Gait Training, Stair Training, Neuromuscular Re-education, Pain Management, Home Exercise Program, Patient Education, Aquatics and Modalities    []  Plan of care initiated. Plan to see patient 2 times per week for 8 weeks to address the treatment planned outlined above.   [x]  Continue with current plan of care  []  Modify plan of care as follows:    []  Hold pending physician visit  []  Discharge     Time In 1045   Time Out 1114   Timed Code Minutes: 29 min   Total Treatment Time: 29 min       Electronically Signed by: Denise Pete PT

## 2021-11-01 ENCOUNTER — HOSPITAL ENCOUNTER (OUTPATIENT)
Dept: PHYSICAL THERAPY | Age: 61
Setting detail: THERAPIES SERIES
Discharge: HOME | End: 2021-11-01
Payer: MEDICARE

## 2021-11-01 NOTE — DISCHARGE SUMMARY
Yusef Howell NOTE  OUTPAT[facility]  [facility]    Patient Name: Juan Perla        CSN: [id number]   YOB: 1960  Gender: female  Ty Jett, APRN - C*,    Displaced bimalleolar fracture of right lower leg, subsequent encounter for closed fracture with routine healing [W36.164J] ,      Patient is discharged from Physical Therapy services at this time. See last note for details related to results of therapy and goal achievement. Reason for discharge: Patient wishes to be discharged at this time. Would like to complete her HEP at home. She understands the exercises she needs to complete. Yousif Hines, [address], Cert.  MDT, [facility]

## 2021-11-02 ENCOUNTER — HOSPITAL ENCOUNTER (OUTPATIENT)
Age: 61
Discharge: HOME | End: 2021-11-02
Payer: MEDICARE

## 2021-11-02 ENCOUNTER — HOSPITAL ENCOUNTER (OUTPATIENT)
Dept: GENERAL RADIOLOGY | Age: 61
Discharge: HOME | End: 2021-11-02
Payer: MEDICARE

## 2021-11-02 DIAGNOSIS — M75.112 INCOMPLETE ROTATOR CUFF TEAR OR RUPTURE OF LEFT SHOULDER, NOT SPECIFIED AS TRAUMATIC: ICD-10-CM

## 2021-11-02 PROCEDURE — 73030 X-RAY EXAM OF SHOULDER: CPT

## 2021-11-11 RX ORDER — METOPROLOL SUCCINATE 25 MG/1
TABLET, EXTENDED RELEASE ORAL
Qty: 90 TABLET | Refills: 3 | Status: SHIPPED | OUTPATIENT
Start: 2021-11-11 | End: 2022-08-24 | Stop reason: SDUPTHER

## 2021-11-12 ENCOUNTER — HOSPITAL ENCOUNTER (EMERGENCY)
Age: 61
Discharge: HOME | End: 2021-11-12
Payer: MEDICARE

## 2021-11-12 VITALS
RESPIRATION RATE: 18 BRPM | BODY MASS INDEX: 29.86 KG/M2 | HEART RATE: 95 BPM | OXYGEN SATURATION: 97 % | DIASTOLIC BLOOD PRESSURE: 78 MMHG | WEIGHT: 185 LBS | TEMPERATURE: 97.3 F | SYSTOLIC BLOOD PRESSURE: 168 MMHG

## 2021-11-12 DIAGNOSIS — J20.9 ACUTE BRONCHITIS, UNSPECIFIED ORGANISM: Primary | ICD-10-CM

## 2021-11-12 PROCEDURE — 99213 OFFICE O/P EST LOW 20 MIN: CPT | Performed by: NURSE PRACTITIONER

## 2021-11-12 PROCEDURE — 99213 OFFICE O/P EST LOW 20 MIN: CPT

## 2021-11-12 RX ORDER — AMOXICILLIN AND CLAVULANATE POTASSIUM 875; 125 MG/1; MG/1
1 TABLET, FILM COATED ORAL 2 TIMES DAILY
Qty: 14 TABLET | Refills: 0 | Status: SHIPPED | OUTPATIENT
Start: 2021-11-12 | End: 2021-11-19

## 2021-11-12 RX ORDER — PREDNISONE 20 MG/1
20 TABLET ORAL 2 TIMES DAILY
Qty: 10 TABLET | Refills: 0 | Status: SHIPPED | OUTPATIENT
Start: 2021-11-12 | End: 2021-11-17

## 2021-11-12 RX ORDER — BENZONATATE 200 MG/1
200 CAPSULE ORAL 3 TIMES DAILY PRN
Qty: 30 CAPSULE | Refills: 0 | Status: SHIPPED | OUTPATIENT
Start: 2021-11-12 | End: 2021-11-19

## 2021-11-12 ASSESSMENT — ENCOUNTER SYMPTOMS
COUGH: 1
SORE THROAT: 0
CHEST TIGHTNESS: 0
DIARRHEA: 0
SHORTNESS OF BREATH: 0
RHINORRHEA: 1
NAUSEA: 0
VOMITING: 0

## 2021-11-12 ASSESSMENT — PAIN SCALES - GENERAL: PAINLEVEL_OUTOF10: 8

## 2021-11-12 ASSESSMENT — PAIN DESCRIPTION - PAIN TYPE: TYPE: ACUTE PAIN

## 2021-11-12 ASSESSMENT — PAIN DESCRIPTION - LOCATION: LOCATION: HEAD

## 2021-11-12 ASSESSMENT — PAIN DESCRIPTION - DESCRIPTORS: DESCRIPTORS: ACHING

## 2021-11-12 NOTE — ED PROVIDER NOTES
of CAD.  High blood pressure     High cholesterol     Joint pain     Nonobstructive CAD (coronary artery disease)     Pseudoaneurysm (HCC)     Patient developed small pseudoaneurysm in right groin requiring thrombin injection by Dr. Jovanni Cooper which was successful.  Tobacco abuse     Tobacco abuse on Habitrol patch.  Wheezing        SURGICALHISTORY     Patient  has a past surgical history that includes partial hysterectomy (cervix not removed) (1999); Rotator cuff repair (Bilateral, 2007); cardiovascular stress test (10 02 2009); doppler echocardiography (10 02 2009); Diagnostic Cardiac Cath Lab Procedure (10 12 2009); back surgery (12/05/2016); Colonoscopy; Colonoscopy (N/A, 2/13/2019); and Ankle fracture surgery (Right, 05/27/2021). CURRENT MEDICATIONS       Discharge Medication List as of 11/12/2021 11:49 AM      CONTINUE these medications which have NOT CHANGED    Details   Magnesium 80 MG TABS Take by mouthHistorical Med      TRAMADOL HCL ER PO Take by mouth. Historical Med      !! metoprolol succinate (TOPROL XL) 25 MG extended release tablet TAKE 1 TABLET BY MOUTH EVERY DAY, Disp-90 tablet, R-3Normal      Flaxseed, Linseed, (FLAX SEED OIL) 1000 MG CAPS Flaxseed Oil Active 1000 MG PO Daily May 26th, 2021 11:37amHistorical Med      Cholecalciferol (VITAMIN D3) 125 MCG (5000 UT) CHEW Take by mouthHistorical Med      Zinc Sulfate (ZINC 15 PO) Take by mouthHistorical Med      blood glucose test strips (FREESTYLE LITE) strip Testing once daily, Disp-300 strip, R-5**Patient requests 90 days supply**Normal      HYDROcodone-acetaminophen (NORCO) 5-325 MG per tablet Historical Med      lisinopril-hydroCHLOROthiazide (PRINZIDE;ZESTORETIC) 20-25 MG per tablet Historical Med      loratadine (CLARITIN) 10 MG tablet TAKE 1 TABLET BY MOUTH EVERY DAYHistorical Med      !! metoprolol succinate (TOPROL XL) 50 MG extended release tablet Take 1 tablet by mouth daily, Disp-90 tablet,R-3Normal      acetaminophen (APAP EXTRA STRENGTH) 500 MG tablet Take 1 tablet by mouth every 6 hours as needed for Pain, Disp-120 tablet, R-0Print      albuterol sulfate HFA (PROAIR HFA) 108 (90 Base) MCG/ACT inhaler Inhale 2 puffs into the lungs every 6 hours as needed for WheezingHistorical Med      potassium chloride (KLOR-CON M) 20 MEQ extended release tablet Take 0.5 tablets by mouth daily, Disp-60 tablet, R-3Adjust Sig      buPROPion (WELLBUTRIN XL) 300 MG extended release tablet Take 300 mg by mouth every morningHistorical Med      carboxymethylcellulose 1 % ophthalmic solution Place 1-2 drops into both eyes as needed for Dry EyesHistorical Med      amlodipine (NORVASC) 5 MG tablet Take 5 mg by mouth daily. sitagliptan-metformin (JANUMET)  MG per tablet Take 1 tablet by mouth 2 times daily. pravastatin (PRAVACHOL) 40 MG tablet Take 40 mg by mouth daily Historical Med       !! - Potential duplicate medications found. Please discuss with provider. ALLERGIES     Patient is is allergic to morphine. Patients   There is no immunization history on file for this patient. FAMILY HISTORY     Patient's family history includes Heart Defect in her brother and brother; Heart Disease in her mother; Heart Disease (age of onset: 43) in her sister; Heart Disease (age of onset: 55) in her sister; High Cholesterol in her mother; Hypertension in her mother; Pacemaker in her mother. SOCIAL HISTORY     Patient  reports that she has been smoking cigarettes. She has a 26.25 pack-year smoking history. She has never used smokeless tobacco. She reports current alcohol use. She reports that she does not use drugs. PHYSICAL EXAM     ED TRIAGE VITALS  BP: (!) 168/78, Temp: 97.3 °F (36.3 °C), Pulse: 95, Resp: 18, SpO2: 97 %,Estimated body mass index is 29.86 kg/m² as calculated from the following:    Height as of 8/20/21: 5' 6\" (1.676 m). Weight as of this encounter: 185 lb (83.9 kg). ,No LMP recorded.  Patient has had a glucose. I did recommend to check her blood sugar prior to administration. We did discuss if the blood sugar is elevated that she should stop taking prednisone. I did discuss that the symptoms may represent Covid. She declined Covid testing as she did not want a swab in her nose. She is instructed use over-the-counter Tylenol or Motrin for pain or fever. We did discuss that she could use Coricidin HBP if the [employer] did not work. She is instructed to follow-up with her PCP in 3 to 5 days no worsening symptoms or she is agreeable with the above plan and denies questions or concerns at this time.       PATIENT REFERRED TO:  Belinda Rangel MD  [address] [facility] [address] [facility] [address] / [facility] [address]      DISCHARGE MEDICATIONS:  Discharge Medication List as of 11/12/2021 11:49 AM      START taking these medications    Details   amoxicillin-clavulanate (AUGMENTIN) 875-125 MG per tablet Take 1 tablet by mouth 2 times daily for 7 days, Disp-14 tablet, R-0Normal      predniSONE (DELTASONE) 20 MG tablet Take 1 tablet by mouth 2 times daily for 5 days, Disp-10 tablet, R-0Normal      benzonatate (TESSALON) 200 MG capsule Take 1 capsule by mouth 3 times daily as needed for Cough, Disp-30 capsule, R-0Normal             Discharge Medication List as of 11/12/2021 11:49 AM          Discharge Medication List as of 11/12/2021 11:49 AM          LUMA Gupta CNP    (Please note that portions of this note were completed with a voice recognition program. Efforts were made to edit the dictations but occasionally words are mis-transcribed.)           LUMA Gupta CNP  11/12/21 2091 LUMA Davis CNP  11/12/21 1212

## 2021-11-12 NOTE — ED TRIAGE NOTES
Pt walked to room 6. Pt here with complaints of a cough, headache, nasal congestion. Cough worse at night. Pt has been using otc meds, but not helping.

## 2021-12-14 ENCOUNTER — HOSPITAL ENCOUNTER (OUTPATIENT)
Dept: OCCUPATIONAL THERAPY | Age: 61
Setting detail: THERAPIES SERIES
Discharge: HOME | End: 2021-12-14
Payer: MEDICARE

## 2021-12-14 PROCEDURE — 97165 OT EVAL LOW COMPLEX 30 MIN: CPT

## 2021-12-14 PROCEDURE — 97110 THERAPEUTIC EXERCISES: CPT

## 2021-12-14 NOTE — PROGRESS NOTES
** PLEASE SIGN, DATE AND TIME CERTIFICATION BELOW AND RETURN TO [facility] OUTPATIENT REHABILITATION (FAX #: [phone]). ATTEST/CO-SIGN IF ACCESSING VIA [employer]. THANK YOU.**    I certify that I have examined the patient below and determined that Physical Medicine and Rehabilitation service is necessary and that I approve the established plan of care for up to 90 days or as specifically noted. Attestation, signature or co-signature of physician indicates approval of certification requirements.    ________________________ ____________ __________  Physician Signature   Date   Time   [address] THERAPY  [x] EVALUATION  [] DAILY NOTE (LAND) [] DAILY NOTE (AQUATIC ) [] PROGRESS NOTE [] DISCHARGE NOTE    [x] [facility]   [] [place] [address]    [] [facility]   [] [facility]    Date: 2021  Patient Name:  Juan Perla  : 1960  MRN: [id number]  CSN: [id number]    Referring Practitioner Janell De Oliveira DO   Diagnosis Unspecified rotator cuff tear or rupture of left shoulder, not specified as traumatic [M75.102]    Treatment Diagnosis RTC tear, decreased ROM, decreased strength, decreased ADLs. Date of Evaluation 21      Functional Outcome Measure Used UEFI   Functional Outcome Score 34/80 (21)       Insurance: Primary: Payor: Krishna Ortiz /  /  / ,   Secondary: MEDICAID [place]   Authorization Information: PRE CERTIFICATION REQUIRED: NO   AQUATIC THERAPY COVERED: YES  MODALITIES COVERED:  YES, NO MASSAGE    Visit # 1, 1/10 for progress note   Visits Allowed: NO VISIT LIMIT    Recertification Date: 41   Physician Follow-Up: 2022   Physician Orders: OT eval and treat [address] strengthening, manual therapy, periscapular stabilization, Left RTC program, modalities    Pertinent History: Pt initially injured Right foot in May, 2021. Had been having PT for that concern. DId not use AE.  Developed increasing pain in the left shoulder. Xrays neg. SUBJECTIVE: cooperative, reports high levels of pain with reaching with left arm     Social/Functional History:  Medications and Allergies have been reviewed and are listed on the Medical History [address] [facility] lives alone in a single story home   Task Prior Level of Function  (current level of function addressed below)   ADLs  Independent   Ambulation Independent   Transfers Independent   Hobbies Watch TV   Driving Active    Work Unemployed     OBJECTIVE:  Hand Dominance right handed   Palpation    Observation Elevated left scapula, mild winging min guarded,    Posture Min left shoulder hike   Edema none   Special Tests Empty can test produces pain , lift off test produces pain able to lift off back x 1 inch       ADL's Difficulty with hair care, reaching behind back, reaching items from cupboard, picking up items out to the side with Left hand , painful to turn the steering wheel, pain wakes pt up at night > 3 times. Bed Mobility  independent   Transfers independent   Balance independent       Sensation Left Upper Extremity: Intact. Coordination WFL           LEFT UPPER EXTREMITY  RANGE OF MOTION    AROM PROM COMMENTS         Shoulder Flexion 103 142    Shoulder Extension      Shoulder Abduction 68 102    Shoulder Adduction      Shoulder External Rotation 48 55    Shoulder Internal Rotation 53 67    Shoulder Range of Motion is [facility]  []      Elbow Flexion      Elbow Extension      Forearm Pronation      Forearm Supination      Elbow Range of Motion is [facility]  [x]      Wrist Flexion      Wrist Extension      Wrist Radial Deviation      Wrist Ulnar Deviation      Wrist Range of Motion is [facility]  [x]   If no measurement is recorded, no formal assessment was completed for that motion.        LEFT UPPER EXTREMITY  STRENGTH    Strength Rating Comments   Shoulder Flexion  NT due to high levels of pain with AROM < 50 % of available pain   Shoulder Extension     Shoulder Abduction     Shoulder Adduction     Shoulder External Rotation     Shoulder Internal Rotation     []  Shoulder Strength is grossly WFL. Elbow Flexion     Elbow Extension     Forearm Pronation     Forearm Supination     [x] Elbow Strength is grossly WFL. Wrist Flexion     Wrist Extension     Wrist Radial Deviation     Wrist Ulnar Deviation     [x]  Wrist Strength is grossly WFL. Right Left    Strength Setting:      Pinch Strength Tip Pinch:      Lateral Pinch           If no ratings are recorded, no formal assessment was completed. TREATMENT   Precautions: None for therapy. Pain:  5/10 at rest,     X in shaded column indicates Activity Completed Today   Modalities Parameters/  Location  Notes/Comments                     Manual Therapy Time/  Technique  Notes/Comments                     Exercises   Sets/  Sec Reps  Notes/Comments   scap retraction, supraspinatus stretch to warm up 1 5 x    Supine dowel bladimir sh flex, ER at side, chest press 1 5 x    Serratus punch no wt 1 5 x                                Activities Time    Notes/Comments                       Specific Interventions Next Treatment: AROM, PROM, eventual RTC strengthening, periscapular strengthening, US PRN    Activity/Treatment Tolerance:  [x]  Patient tolerated treatment well  []  Patient limited by fatigue  [x]  Patient limited by pain   []  Patient limited by other medical complications  []  Other:     Assessment: Pr presents with a decline in independent functioning with a decreased ability to lift L UE without pain and decreased strength, It decreases her independence in self care, homemaking task and carrying groceries. Pt would benefit from additional skilled OT services to address pain management and increasing ROM , strengthening  Activities to improve function and decrease pain to increase independence in overall functioning.    Areas for Improvement: impaired activity tolerance, impaired ROM, impaired strength and pain  Prognosis: good    GOALS:  Patient Goal: I want to be able to move my arm without pain    Short Term Goals:  Time Frame: 10 sessions   *  Patient will increase left shoulder AROM greater than 115degrees flexion, 80 degrees abduction, 60 for IR and 58 degrees ER with the arm at the side to increase ease and ability to put on a shirt. *  Patient will increase left shoulder PROM greater than 150 degrees flexion, 120 degrees abduction, 60 degrees IR and 70 degrees ER in abduction to increase ease and ability to wash and style hair. *  Pt. will report decreased pain with left shoulder to no greater than 5/10 for ability to reach to a shoulder height cupboard to retrieve a  glass   *  Patient will be independent with HEP to increase ease and ability to sleep on left side without waking < 1 time per night. Long Term Goals:  Time Frame: 8 weeks   *  Patient will report dressing and bathing with no greater than 3 pain increase. *  Patient will report sleeping on the left  side without waking due to pain. *  Patient will improve UEFS to at least 60/80  * Patient will demonstrate ability to reach overhead for an object with affected extremity without increased pain. Patient Education:   []  HEP/Education Completed: Plan of Access Code: [email or web address]  URL: [email or web address]  Date: 12/14/2021  Prepared by: Alyse Mayorga    Exercises  Standing Scapular Retraction - 2 x daily - 7 x weekly - 1 sets - 10 reps  Seated Shoulder Flexion Towel Slide at Table Top - 2 x daily - 7 x weekly - 1 sets - 10 reps  Upper Trapezius Stretch - 2 x daily - 7 x weekly - 1 sets - 10 reps  Supine Shoulder Flexion Extension AAROM with Dowel - 2 x daily - 7 x weekly - 1 sets - 10 reps  Supine Shoulder External Rotation with Dowel - 2 x daily - 7 x weekly - 1 sets - 10 reps  Supine Shoulder Press AAROM in Abduction with Dowel - 2 x daily - 7 x weekly - 1 sets - 10 reps  Supine Scapular Protraction in Flexion with Dumbbells - 2 x daily - 7 x weekly - 1 sets - 10 reps  Care, Goals,    [address] Access Code for HEP:   []  No new Education completed  []  Reviewed Prior HEP      [x]  Patient verbalized and/or demonstrated understanding of education provided. []  Patient unable to verbalize and/or demonstrate understanding of education provided. Will continue education. [x]  Barriers to learning: none   PLAN:  Treatment Recommendations: Strengthening, Range of Motion, Manual Therapy - Soft Tissue Mobilization, Manual Therapy - Joint Manipulation, Pain Management, Home Exercise Program, Patient Education, Self-Care Education and Training and Modalities    [x]  Plan of care initiated. Plan to see patient 2 times per week for 8 weeks to address the treatment planned outlined above.   []  Continue with current plan of care  []  Modify plan of care as follows:    []  Hold pending physician visit  []  Discharge    Time In 0945   Time Out 1045   Timed Code Minutes: 25 min   Total Treatment Time: 60 min       Electronically Signed by: SAHYE Santacruz OTR/L [id number]

## 2021-12-20 ENCOUNTER — HOSPITAL ENCOUNTER (OUTPATIENT)
Dept: OCCUPATIONAL THERAPY | Age: 61
Setting detail: THERAPIES SERIES
Discharge: HOME | End: 2021-12-20
Payer: MEDICARE

## 2021-12-20 PROCEDURE — 97035 APP MDLTY 1+ULTRASOUND EA 15: CPT

## 2021-12-20 PROCEDURE — 97110 THERAPEUTIC EXERCISES: CPT

## 2021-12-23 ENCOUNTER — HOSPITAL ENCOUNTER (OUTPATIENT)
Dept: OCCUPATIONAL THERAPY | Age: 61
Setting detail: THERAPIES SERIES
End: 2021-12-23
Payer: MEDICARE

## 2021-12-27 ENCOUNTER — HOSPITAL ENCOUNTER (OUTPATIENT)
Dept: OCCUPATIONAL THERAPY | Age: 61
Setting detail: THERAPIES SERIES
Discharge: HOME | End: 2021-12-27
Payer: MEDICARE

## 2021-12-27 PROCEDURE — 97035 APP MDLTY 1+ULTRASOUND EA 15: CPT

## 2021-12-27 PROCEDURE — 97110 THERAPEUTIC EXERCISES: CPT

## 2021-12-27 NOTE — PROGRESS NOTES
IASTM to L deltoid, bicep, subscap, tricep   x          Exercises   Sets/  Sec Reps  Notes/Comments   Scapular retraction, backward shoulder circles. 1 10 x Warm up    Supine PROM to L shoulder all motions to tolerance    x Tightness/pain in all motions, improved flexion tolerance noted today. Supine dowel bladimir sh flex, ER at side, chest press 1 10 ea x    Serratus punch no wt 1 5     Pec stretch over vertical towel roll with gentle over pressure from therapist 2 min      Corner stretch 15 sec 3 x    Standing dowel extension behind the back  1 10 x           Activities Time    Notes/Comments   Reapplied kinesiotape 3 donut hole correction over AC joint   x                  Specific Interventions Next Treatment: AROM, PROM, eventual RTC strengthening, periscapular strengthening, US PRN    Activity/Treatment Tolerance:  [x]  Patient tolerated treatment well  []  Patient limited by fatigue  [x]  Patient limited by pain   []  Patient limited by other medical complications  []  Other:     Assessment: Patient is progressing towards goals. Areas for Improvement: impaired activity tolerance, impaired ROM, impaired strength and pain  Prognosis: good    GOALS:  Patient Goal: I want to be able to move my arm without pain    Short Term Goals:  Time Frame: 10 sessions   *  Patient will increase left shoulder AROM greater than 115degrees flexion, 80 degrees abduction, 60 for IR and 58 degrees ER with the arm at the side to increase ease and ability to put on a shirt. *  Patient will increase left shoulder PROM greater than 150 degrees flexion, 120 degrees abduction, 60 degrees IR and 70 degrees ER in abduction to increase ease and ability to wash and style hair.     *  Pt. will report decreased pain with left shoulder to no greater than 5/10 for ability to reach to a shoulder height cupboard to retrieve a  glass   *  Patient will be independent with HEP to increase ease and ability to sleep on left side without waking < 1 time per night. Long Term Goals:  Time Frame: 8 weeks   *  Patient will report dressing and bathing with no greater than 3 pain increase. *  Patient will report sleeping on the left  side without waking due to pain. *  Patient will improve UEFS to at least 60/80  * Patient will demonstrate ability to reach overhead for an object with affected extremity without increased pain. Patient Education:   []  HEP/Education Completed: Encouraged continued HEP and alternating ice/heat for pain management and decreasing tightness. Plan of Access Code: [email or web address]  URL: [email or web address]  Date: 12/14/2021  Prepared by: Darrin Garrett    Exercises  Standing Scapular Retraction - 2 x daily - 7 x weekly - 1 sets - 10 reps  Seated Shoulder Flexion Towel Slide at Table Top - 2 x daily - 7 x weekly - 1 sets - 10 reps  Upper Trapezius Stretch - 2 x daily - 7 x weekly - 1 sets - 10 reps  Supine Shoulder Flexion Extension AAROM with Dowel - 2 x daily - 7 x weekly - 1 sets - 10 reps  Supine Shoulder External Rotation with Dowel - 2 x daily - 7 x weekly - 1 sets - 10 reps  Supine Shoulder Press AAROM in Abduction with Dowel - 2 x daily - 7 x weekly - 1 sets - 10 reps  Supine Scapular Protraction in Flexion with Dumbbells - 2 x daily - 7 x weekly - 1 sets - 10 reps  Care, Goals,    Medbridge Access Code for HEP:   [x]  No new Education completed  []  Reviewed Prior HEP      [x]  Patient verbalized and/or demonstrated understanding of education provided. []  Patient unable to verbalize and/or demonstrate understanding of education provided. Will continue education. [x]  Barriers to learning: none     PLAN:  Treatment Recommendations: Strengthening, Range of Motion, Manual Therapy - Soft Tissue Mobilization, Manual Therapy - Joint Manipulation, Pain Management, Home Exercise Program, Patient Education, Self-Care Education and Training and Modalities    []  Plan of care initiated.   Plan to see patient 2 times per week for 8 weeks to address the treatment planned outlined above.   [x]  Continue with current plan of care  []  Modify plan of care as follows:    []  Hold pending physician visit  []  Discharge    Time In 1400   Time Out 1445   Timed Code Minutes: 45 min   Total Treatment Time: 45 min       STANISLAW OWENS/ZHANNA #[id number]

## 2021-12-30 ENCOUNTER — HOSPITAL ENCOUNTER (OUTPATIENT)
Dept: OCCUPATIONAL THERAPY | Age: 61
Setting detail: THERAPIES SERIES
Discharge: HOME | End: 2021-12-30
Payer: MEDICARE

## 2021-12-30 PROCEDURE — 97110 THERAPEUTIC EXERCISES: CPT

## 2021-12-30 PROCEDURE — 97035 APP MDLTY 1+ULTRASOUND EA 15: CPT

## 2021-12-30 NOTE — PROGRESS NOTES
[address]  [] EVALUATION  [x] DAILY NOTE (LAND) [] DAILY NOTE (AQUATIC )   [] PROGRESS NOTE [] DISCHARGE NOTE    [x] OUTPATIENT REHABILITATION [facility]   [] [place] [address]    [] [facility]   [] [place]    Date: 2021  Patient Name:  Pilar Petersen  : 1960  MRN: [id number]  CSN: [id number]    Referring Practitioner Ernestine Armstrong DO   Diagnosis Unspecified rotator cuff tear or rupture of left shoulder, not specified as traumatic [M75.102]    Treatment Diagnosis RTC tear, decreased ROM, decreased strength, decreased ADLs. Date of Evaluation 21      Functional Outcome Measure Used UEFI   Functional Outcome Score 34/80 (21)       Insurance: Primary: Payor: Manoj Morocho /  /  / ,   Secondary: MEDICAID [place]   Authorization Information: PRE CERTIFICATION REQUIRED: NO   AQUATIC THERAPY COVERED: YES  MODALITIES COVERED:  YES, NO MASSAGE    Visit # 4, /10 for progress note   Visits Allowed: NO VISIT LIMIT    Recertification Date: 14   Physician Follow-Up: 2022   Physician Orders: OT eval and treat [address] strengthening, manual therapy, periscapular stabilization, Left RTC program, modalities    Pertinent History: Pt initially injured Right foot in May, 2021. Had been having PT for that concern. DId not use AE. Developed increasing pain in the left shoulder. Xrays neg. SUBJECTIVE: Patient is pleasant and cooperative. Reports she is feeling a little less pain. TREATMENT   Precautions: None for therapy. Pain:  6/10 anterior/lateral shoulder at start of session.      X in shaded column indicates Activity Completed Today   Modalities Parameters/  Location  Notes/Comments   US to L anterior shoulder 100% 1.0 w/CM2 x 8 minutes   x                Manual Therapy Time/  Technique  Notes/Comments   Manual massage to L upper trap, levator scap, medial scap border, rhomboids following ultrasound to decrease tightness  x    IASTM to L deltoid, bicep, subscap, tricep   x          Exercises   Sets/  Sec Reps  Notes/Comments   Scapular retraction, backward shoulder circles. 1 10 x Warm up    Supine PROM to L shoulder all motions to tolerance    x Tightness/pain in all motions, improved flexion tolerance noted today. Supine dowel bladimir sh flex, ER at side, chest press 1 10 ea x    Serratus punch no wt, shoulder circles CCW 1 5 x    Pec stretch over vertical towel roll with gentle over pressure from therapist 2 min      Corner stretch 15 sec 3 x    Standing dowel extension behind the back  1 10 x           Activities Time    Notes/Comments   Reapplied kinesiotape 3 donut hole correction over AC joint   x                  Specific Interventions Next Treatment: AROM, PROM, eventual RTC strengthening, periscapular strengthening, US PRN    Activity/Treatment Tolerance:  [x]  Patient tolerated treatment well  []  Patient limited by fatigue  [x]  Patient limited by pain   []  Patient limited by other medical complications  []  Other:     Assessment: Patient is progressing towards goals. Slowly making gains with ROM and pain tolerance . Areas for Improvement: impaired activity tolerance, impaired ROM, impaired strength and pain  Prognosis: good    GOALS:  Patient Goal: I want to be able to move my arm without pain    Short Term Goals:  Time Frame: 10 sessions   *  Patient will increase left shoulder AROM greater than 115degrees flexion, 80 degrees abduction, 60 for IR and 58 degrees ER with the arm at the side to increase ease and ability to put on a shirt. *  Patient will increase left shoulder PROM greater than 150 degrees flexion, 120 degrees abduction, 60 degrees IR and 70 degrees ER in abduction to increase ease and ability to wash and style hair.     *  Pt. will report decreased pain with left shoulder to no greater than 5/10 for ability to reach to a shoulder height cupboard to retrieve a  glass   *  Patient will be independent with HEP to increase ease and ability to sleep on left side without waking < 1 time per night. Long Term Goals:  Time Frame: 8 weeks   *  Patient will report dressing and bathing with no greater than 3 pain increase. *  Patient will report sleeping on the left  side without waking due to pain. *  Patient will improve UEFS to at least 60/80  * Patient will demonstrate ability to reach overhead for an object with affected extremity without increased pain. Patient Education:   []  HEP/Education Completed: Encouraged continued HEP and alternating ice/heat for pain management and decreasing tightness. Plan of Access Code: [email or web address]  URL: [email or web address]  Date: 12/14/2021  Prepared by: Aiden Guerrero    Exercises  Standing Scapular Retraction - 2 x daily - 7 x weekly - 1 sets - 10 reps  Seated Shoulder Flexion Towel Slide at Table Top - 2 x daily - 7 x weekly - 1 sets - 10 reps  Upper Trapezius Stretch - 2 x daily - 7 x weekly - 1 sets - 10 reps  Supine Shoulder Flexion Extension AAROM with Dowel - 2 x daily - 7 x weekly - 1 sets - 10 reps  Supine Shoulder External Rotation with Dowel - 2 x daily - 7 x weekly - 1 sets - 10 reps  Supine Shoulder Press AAROM in Abduction with Dowel - 2 x daily - 7 x weekly - 1 sets - 10 reps  Supine Scapular Protraction in Flexion with Dumbbells - 2 x daily - 7 x weekly - 1 sets - 10 reps  Care, Goals,    Medbridge Access Code for HEP:   [x]  No new Education completed  []  Reviewed Prior HEP      [x]  Patient verbalized and/or demonstrated understanding of education provided. []  Patient unable to verbalize and/or demonstrate understanding of education provided. Will continue education.   [x]  Barriers to learning: none     PLAN:  Treatment Recommendations: Strengthening, Range of Motion, Manual Therapy - Soft Tissue Mobilization, Manual Therapy - Joint Manipulation, Pain Management, Home Exercise Program, Patient Education, Self-Care Education and Training and Modalities    []  Plan of care initiated. Plan to see patient 2 times per week for 8 weeks to address the treatment planned outlined above.   [x]  Continue with current plan of care  []  Modify plan of care as follows:    []  Hold pending physician visit  []  Discharge    Time In 1035   Time Out 1120   Timed Code Minutes: 45 min   Total Treatment Time: 45 min     SHAYE Hassan OTR/L [id number]

## 2022-01-03 ENCOUNTER — HOSPITAL ENCOUNTER (OUTPATIENT)
Dept: OCCUPATIONAL THERAPY | Age: 62
Setting detail: THERAPIES SERIES
Discharge: HOME | End: 2022-01-03
Payer: MEDICARE

## 2022-01-03 PROCEDURE — 97035 APP MDLTY 1+ULTRASOUND EA 15: CPT

## 2022-01-03 PROCEDURE — 97110 THERAPEUTIC EXERCISES: CPT

## 2022-01-03 NOTE — PROGRESS NOTES
[address]  [] EVALUATION  [x] DAILY NOTE (LAND) [] DAILY NOTE (AQUATIC )   [] PROGRESS NOTE [] DISCHARGE NOTE    [x] OUTPATIENT REHABILITATION [facility]   [] [place] [address]    [] [facility]   [] [facility]    Date: 1/3/2022  Patient Name:  Satnam Carter  : 1960  MRN: [id number]  CSN: [id number]    Referring Practitioner Dominique Franklin DO   Diagnosis Unspecified rotator cuff tear or rupture of left shoulder, not specified as traumatic [M75.102]    Treatment Diagnosis RTC tear, decreased ROM, decreased strength, decreased ADLs. Date of Evaluation 21      Functional Outcome Measure Used UEFI   Functional Outcome Score 34/80 (21)       Insurance: Primary: Payor: Melvi Plunkett /  /  / ,   Secondary: MEDICAID [place]   Authorization Information: PRE CERTIFICATION REQUIRED: NO   AQUATIC THERAPY COVERED: YES  MODALITIES COVERED:  YES, NO MASSAGE    Visit # 1 , 5/10 for progress note  (5 visits used in )   Visits Allowed: NO VISIT LIMIT    Recertification Date:    Physician Follow-Up: 2022   Physician Orders: OT eval and treat [address] strengthening, manual therapy, periscapular stabilization, Left RTC program, modalities    Pertinent History: Pt initially injured Right foot in May, 2021. Had been having PT for that concern. DId not use AE. Developed increasing pain in the left shoulder. Xrays neg. SUBJECTIVE: Patient is pleasant and cooperative. Reports she is feeling a little less pain. Concerned that she is still having soreness. DIscussed trialing additional posterior capsule strengthening and potentially RTC strengthening to assist with fwd sh placement       TREATMENT   Precautions: None for therapy. Pain:  6/10 anterior/lateral shoulder at start of session.      X in shaded column indicates Activity Completed Today   Modalities Parameters/  Location  Notes/Comments   US to L anterior shoulder 100% 1.0 w/CM2 x 8 minutes   x                Manual Therapy Time/  Technique  Notes/Comments   Manual massage to L upper trap, anterior to middle shoulder  following ultrasound to decrease tightness      IASTM to L deltoid, bicep, subscap, tricep   x          Exercises   Sets/  Sec Reps  Notes/Comments   Scapular retraction, backward shoulder circles. 1 10 x Warm up    Supine PROM to L shoulder all motions to tolerance    x Tightness/pain in all motions at near end ROM,    Supine dowel bladimir sh flex, horiz ABD, chest press 1 10 ea x Palm up was less painful   Serratus punch no wt, shoulder circles CCW, 1 # wt, 1 10 x    Pec stretch over vertical towel roll with gentle over pressure from therapist 2 min      Corner stretch 15 sec 3 x    Standing dowel extension behind the back  1 10 x    biodex at 90 RPM x 2 min bwd   x Cool down          Rows with peach theraband 1 10 x Cues for stopping with elbows at side vs excessive anterior glide of the humeral head. Added to HEP   Activities Time    Notes/Comments   Kinesiotape applied for inhibition of anterior shoulder,/ sh support,  2  strips  x Good tolerance. trialed this vs donut hole over AC taping previously trialed. Specific Interventions Next Treatment: AROM, PROM, eventual RTC strengthening, periscapular strengthening, US PRN    Activity/Treatment Tolerance:  [x]  Patient tolerated treatment well  []  Patient limited by fatigue  [x]  Patient limited by pain   []  Patient limited by other medical complications  []  Other:     Assessment: Patient is progressing towards goals. Slowly making gains with ROM and pain tolerance .    Areas for Improvement: impaired activity tolerance, impaired ROM, impaired strength and pain  Prognosis: good    GOALS:  Patient Goal: I want to be able to move my arm without pain    Short Term Goals:  Time Frame: 10 sessions   *  Patient will increase left shoulder AROM greater than 115degrees flexion, 80 degrees abduction, 60 for IR and 58 degrees ER with the arm at the side to increase ease and ability to put on a shirt. *  Patient will increase left shoulder PROM greater than 150 degrees flexion, 120 degrees abduction, 60 degrees IR and 70 degrees ER in abduction to increase ease and ability to wash and style hair. *  Pt. will report decreased pain with left shoulder to no greater than 5/10 for ability to reach to a shoulder height cupboard to retrieve a  glass   *  Patient will be independent with HEP to increase ease and ability to sleep on left side without waking < 1 time per night. Long Term Goals:  Time Frame: 8 weeks   *  Patient will report dressing and bathing with no greater than 3 pain increase. *  Patient will report sleeping on the left  side without waking due to pain. *  Patient will improve UEFS to at least 60/80  * Patient will demonstrate ability to reach overhead for an object with affected extremity without increased pain. Patient Education:   []  HEP/Education Completed: Encouraged continued HEP and alternating ice/heat for pain management and decreasing tightness. Plan of Access Code: [email or web address]  URL: [email or web address]  Date: 12/14/2021  Prepared by: Kayli Bob    Exercises  Standing Scapular Retraction - 2 x daily - 7 x weekly - 1 sets - 10 reps  Seated Shoulder Flexion Towel Slide at Table Top - 2 x daily - 7 x weekly - 1 sets - 10 reps  Upper Trapezius Stretch - 2 x daily - 7 x weekly - 1 sets - 10 reps  Supine Shoulder Flexion Extension AAROM with Dowel - 2 x daily - 7 x weekly - 1 sets - 10 reps  Supine Shoulder External Rotation with Dowel - 2 x daily - 7 x weekly - 1 sets - 10 reps  Supine Shoulder Press AAROM in Abduction with Dowel - 2 x daily - 7 x weekly - 1 sets - 10 reps  Supine Scapular Protraction in Flexion with Dumbbells - 2 x daily - 7 x weekly - 1 sets - 10 reps  Care, Goals,    Medbridge Access Code for HEP:   [x]  No new Education completed  []  Reviewed Prior HEP      [x]  Patient verbalized and/or demonstrated understanding of education provided. []  Patient unable to verbalize and/or demonstrate understanding of education provided. Will continue education. [x]  Barriers to learning: none     PLAN:  Treatment Recommendations: Strengthening, Range of Motion, Manual Therapy - Soft Tissue Mobilization, Manual Therapy - Joint Manipulation, Pain Management, Home Exercise Program, Patient Education, Self-Care Education and Training and Modalities    []  Plan of care initiated. Plan to see patient 2 times per week for 8 weeks to address the treatment planned outlined above.   [x]  Continue with current plan of care  []  Modify plan of care as follows:    []  Hold pending physician visit  []  Discharge    Time In 1035   Time Out 1120   Timed Code Minutes: 45 min   Total Treatment Time: 45 min     SHAYE Crowley OTR/L [id number]

## 2022-01-06 ENCOUNTER — HOSPITAL ENCOUNTER (OUTPATIENT)
Dept: OCCUPATIONAL THERAPY | Age: 62
Setting detail: THERAPIES SERIES
Discharge: HOME | End: 2022-01-06
Payer: MEDICARE

## 2022-01-06 PROCEDURE — 97110 THERAPEUTIC EXERCISES: CPT

## 2022-01-06 PROCEDURE — 97035 APP MDLTY 1+ULTRASOUND EA 15: CPT

## 2022-01-06 NOTE — PROGRESS NOTES
[address]  [] EVALUATION  [x] DAILY NOTE (LAND) [] DAILY NOTE (AQUATIC )   [] PROGRESS NOTE [] DISCHARGE NOTE    [x] OUTPATIENT REHABILITATION CENTER [facility]   [] [place] [address]    [] [facility]   [] [facility]    Date: 2022  Patient Name:  Amanda Kong  : 1960  MRN: [id number]  CSN: [id number]    Referring Practitioner Pasha Wang DO   Diagnosis Unspecified rotator cuff tear or rupture of left shoulder, not specified as traumatic [M75.102]    Treatment Diagnosis RTC tear, decreased ROM, decreased strength, decreased ADLs. Date of Evaluation 21      Functional Outcome Measure Used UEFI   Functional Outcome Score 34/80 (21)       Insurance: Primary: Payor: Elana Iglesias /  /  / ,   Secondary: MEDICAID [place]   Authorization Information: PRE CERTIFICATION REQUIRED: NO   AQUATIC THERAPY COVERED: YES  MODALITIES COVERED:  YES, NO MASSAGE    Visit # 6 , 6/10 for progress note  (5 visits used in )   Visits Allowed: NO VISIT LIMIT    Recertification Date:    Physician Follow-Up: 2022   Physician Orders: OT eval and treat [address] strengthening, manual therapy, periscapular stabilization, Left RTC program, modalities    Pertinent History: Pt initially injured Right foot in May, 2021. Had been having PT for that concern. DId not use AE. Developed increasing pain in the left shoulder. Xrays neg. SUBJECTIVE: Patient is pleasant and cooperative. Reports she is feeling a little less pain. Added additional posterior capsule strengthening and RTC strengthening to assist with fwd sh placement this session with pt reporting no numbness throughout      TREATMENT   Precautions: None for therapy. Pain:  5/10 anterior/lateral shoulder at start of session.      X in shaded column indicates Activity Completed Today   Modalities Parameters/  Location  Notes/Comments   US to L anterior shoulder 100% 1.0 w/CM2 x 8 minutes   x                Manual Therapy Time/  Technique  Notes/Comments   Manual massage to L upper trap, anterior to middle shoulder  following ultrasound to decrease tightness      IASTM to L deltoid, bicep, subscap, tricep   x          Exercises   Sets/  Sec Reps  Notes/Comments   Scapular retraction, backward shoulder circles. 1 10 x Warm up    Supine PROM to L shoulder all motions to tolerance    x Tightness/pain in all motions at near end ROM,    Supine dowel bladimir sh flex, horiz ABD, chest press 1 10 ea  Palm up was less painful   Serratus punch 2 # wt, shoulder circles CCW,no wt 1 10 x    Pec stretch over vertical towel roll with gentle over pressure from therapist 2 min      Corner stretch 15 sec 3     Standing dowel extension behind the back  1 10     biodex at 90 RPM x 3min bwd   x Cool down   Prone I,Y , extension 1 10 x trialed T - horiz ABD , painful   Rows with peach theraband  Pato protocol initiated this session  1 10 x Min Cues for stopping with elbows at side vs excessive anterior glide of the humeral head. Added to HEP   Activities Time    Notes/Comments   Kinesiotape applied for inhibition of anterior shoulder,/ sh support,  2  strips  x Good tolerance. trialed this vs donut hole over AC taping previously trialed. Specific Interventions Next Treatment: AROM, PROM,  RTC strengthening, periscapular strengthening, US PRN    Activity/Treatment Tolerance:  [x]  Patient tolerated treatment well  []  Patient limited by fatigue  [x]  Patient limited by pain   []  Patient limited by other medical complications  []  Other:     Assessment: Patient is progressing towards goals. Slowly making gains with ROM and pain tolerance . Initiated light strengthening ex this session with no reports of numbness noted.  Initiated to [facility]   Areas for Improvement: impaired activity tolerance, impaired ROM, impaired strength and pain  Prognosis: good    GOALS:  Patient Goal: I want to be able to move my arm without pain    Short Term Goals:  Time Frame: 10 sessions   *  Patient will increase left shoulder AROM greater than 115degrees flexion, 80 degrees abduction, 60 for IR and 58 degrees ER with the arm at the side to increase ease and ability to put on a shirt. *  Patient will increase left shoulder PROM greater than 150 degrees flexion, 120 degrees abduction, 60 degrees IR and 70 degrees ER in abduction to increase ease and ability to wash and style hair. *  Pt. will report decreased pain with left shoulder to no greater than 5/10 for ability to reach to a shoulder height cupboard to retrieve a  glass   *  Patient will be independent with HEP to increase ease and ability to sleep on left side without waking < 1 time per night. Long Term Goals:  Time Frame: 8 weeks   *  Patient will report dressing and bathing with no greater than 3 pain increase. *  Patient will report sleeping on the left  side without waking due to pain. *  Patient will improve UEFS to at least 60/80  * Patient will demonstrate ability to reach overhead for an object with affected extremity without increased pain. Patient Education:   []  HEP/Education Completed: Encouraged continued HEP and alternating ice/heat for pain management and decreasing tightness. Plan of Access Code: [email or web address]  URL: [email or web address]  Date: 12/14/2021  Prepared by: Esmer Landeros    Exercises  Standing Scapular Retraction - 2 x daily - 7 x weekly - 1 sets - 10 reps  Seated Shoulder Flexion Towel Slide at Table Top - 2 x daily - 7 x weekly - 1 sets - 10 reps  Upper Trapezius Stretch - 2 x daily - 7 x weekly - 1 sets - 10 reps  Supine Shoulder Flexion Extension AAROM with Dowel - 2 x daily - 7 x weekly - 1 sets - 10 reps  Supine Shoulder External Rotation with Dowel - 2 x daily - 7 x weekly - 1 sets - 10 reps  Supine Shoulder Press AAROM in Abduction with Dowel - 2 x daily - 7 x weekly - 1 sets - 10 reps  Supine Scapular Protraction in Flexion with Dumbbells - 2 x daily - 7 x weekly - 1 sets - 10 reps  Care, Goals,    [address] Access Code for HEP:   [x]  No new Education completed  []  Reviewed Prior HEP      [x]  Patient verbalized and/or demonstrated understanding of education provided. []  Patient unable to verbalize and/or demonstrate understanding of education provided. Will continue education. [x]  Barriers to learning: none     PLAN:  Treatment Recommendations: Strengthening, Range of Motion, Manual Therapy - Soft Tissue Mobilization, Manual Therapy - Joint Manipulation, Pain Management, Home Exercise Program, Patient Education, Self-Care Education and Training and Modalities    []  Plan of care initiated. Plan to see patient 2 times per week for 8 weeks to address the treatment planned outlined above.   [x]  Continue with current plan of care  []  Modify plan of care as follows:    []  Hold pending physician visit  []  Discharge    Time In 1030   Time Out 1120   Timed Code Minutes: 50 min   Total Treatment Time: 50 min     SHAYE Batres OTR/L [id number]

## 2022-01-07 ENCOUNTER — HOSPITAL ENCOUNTER (EMERGENCY)
Age: 62
Discharge: HOME | End: 2022-01-07
Payer: MEDICARE

## 2022-01-07 VITALS
HEIGHT: 60 IN | TEMPERATURE: 98.9 F | HEART RATE: 112 BPM | BODY MASS INDEX: 35.93 KG/M2 | SYSTOLIC BLOOD PRESSURE: 178 MMHG | DIASTOLIC BLOOD PRESSURE: 80 MMHG | OXYGEN SATURATION: 96 % | RESPIRATION RATE: 18 BRPM | WEIGHT: 183 LBS

## 2022-01-07 DIAGNOSIS — U07.1 COVID-19: Primary | ICD-10-CM

## 2022-01-07 LAB
FLU A ANTIGEN: NEGATIVE
FLU B ANTIGEN: NEGATIVE
SARS-COV-2, NAA: DETECTED

## 2022-01-07 PROCEDURE — 87635 SARS-COV-2 COVID-19 AMP PRB: CPT

## 2022-01-07 PROCEDURE — 99213 OFFICE O/P EST LOW 20 MIN: CPT

## 2022-01-07 PROCEDURE — 87804 INFLUENZA ASSAY W/OPTIC: CPT

## 2022-01-07 PROCEDURE — 99213 OFFICE O/P EST LOW 20 MIN: CPT | Performed by: NURSE PRACTITIONER

## 2022-01-07 ASSESSMENT — PAIN DESCRIPTION - PAIN TYPE: TYPE: ACUTE PAIN

## 2022-01-07 ASSESSMENT — PAIN SCALES - GENERAL: PAINLEVEL_OUTOF10: 7

## 2022-01-07 ASSESSMENT — ENCOUNTER SYMPTOMS
SORE THROAT: 1
COUGH: 1

## 2022-01-07 ASSESSMENT — PAIN - FUNCTIONAL ASSESSMENT: PAIN_FUNCTIONAL_ASSESSMENT: PREVENTS OR INTERFERES SOME ACTIVE ACTIVITIES AND ADLS

## 2022-01-07 ASSESSMENT — PAIN DESCRIPTION - DESCRIPTORS: DESCRIPTORS: HEADACHE

## 2022-01-07 NOTE — ED TRIAGE NOTES
Patient ambulated to room with complaint of sore throat, headache, body ache and cough that started last night

## 2022-01-07 NOTE — ED PROVIDER NOTES
[phone]       Chief Complaint   Patient presents with    Cough    Pharyngitis       Nurses Notes reviewed and I agree except as noted in the HPI. HISTORY OF PRESENT ILLNESS   Albert Verdugo is a 57 y.o. female who presents for evaluation and COVID testing due to symptoms that started last night. The history is provided by the patient. URI  Presenting symptoms: cough and sore throat         The patient/patient representative has no other acute complaints at this time. REVIEW OF SYSTEMS     Review of Systems   HENT: Positive for sore throat. Respiratory: Positive for cough. All other systems reviewed and are negative. PAST MEDICAL HISTORY         Diagnosis Date    Arthritis     Asthma     Atypical chest pain     Atypical chest discomfort in hospital multiple times with stress test being normal.     Bell's palsy     COPD (chronic obstructive pulmonary disease) (HCC)     Costochondritis     Diabetes mellitus (HCC)     Fatigue     FH: CAD (coronary artery disease)     Strong family history of CAD.  High blood pressure     High cholesterol     Joint pain     Nonobstructive CAD (coronary artery disease)     Pseudoaneurysm (HCC)     Patient developed small pseudoaneurysm in right groin requiring thrombin injection by Dr. Lola Meléndez which was successful.  Tobacco abuse     Tobacco abuse on Habitrol patch.  Wheezing        SURGICAL HISTORY     Patient  has a past surgical history that includes partial hysterectomy (cervix not removed) (1999); Rotator cuff repair (Bilateral, 2007); cardiovascular stress test (10 02 2009); doppler echocardiography (10 02 2009); Diagnostic Cardiac Cath Lab Procedure (10 12 2009); back surgery (12/05/2016); Colonoscopy; Colonoscopy (N/A, 2/13/2019); and Ankle fracture surgery (Right, 05/27/2021).     CURRENT MEDICATIONS       Previous Medications    ACETAMINOPHEN (APAP EXTRA STRENGTH) 500 MG TABLET    Take 1 tablet by mouth every 6 hours as needed for Pain    ALBUTEROL SULFATE HFA (PROAIR HFA) 108 (90 BASE) MCG/ACT INHALER    Inhale 2 puffs into the lungs every 6 hours as needed for Wheezing    AMLODIPINE (NORVASC) 5 MG TABLET    Take 5 mg by mouth daily. BLOOD GLUCOSE TEST STRIPS (FREESTYLE LITE) STRIP    Testing once daily    BUPROPION (WELLBUTRIN XL) 300 MG EXTENDED RELEASE TABLET    Take 300 mg by mouth every morning    CARBOXYMETHYLCELLULOSE 1 % OPHTHALMIC SOLUTION    Place 1-2 drops into both eyes as needed for Dry Eyes    CHOLECALCIFEROL (VITAMIN D3) 125 MCG (5000 UT) CHEW    Take by mouth    FLAXSEED, LINSEED, (FLAX SEED OIL) 1000 MG CAPS    Flaxseed Oil Active 1000 MG PO Daily May 26th, 2021 11:37am    HYDROCODONE-ACETAMINOPHEN (NORCO) 5-325 MG PER TABLET        LISINOPRIL-HYDROCHLOROTHIAZIDE (PRINZIDE;ZESTORETIC) 20-25 MG PER TABLET        LORATADINE (CLARITIN) 10 MG TABLET    TAKE 1 TABLET BY MOUTH EVERY DAY    MAGNESIUM 80 MG TABS    Take by mouth    METOPROLOL SUCCINATE (TOPROL XL) 25 MG EXTENDED RELEASE TABLET    TAKE 1 TABLET BY MOUTH EVERY DAY    METOPROLOL SUCCINATE (TOPROL XL) 50 MG EXTENDED RELEASE TABLET    Take 1 tablet by mouth daily    POTASSIUM CHLORIDE (KLOR-CON M) 20 MEQ EXTENDED RELEASE TABLET    Take 0.5 tablets by mouth daily    PRAVASTATIN (PRAVACHOL) 40 MG TABLET    Take 40 mg by mouth daily     SITAGLIPTAN-METFORMIN (JANUMET)  MG PER TABLET    Take 1 tablet by mouth 2 times daily. TRAMADOL HCL ER PO    Take by mouth. ZINC SULFATE (ZINC 15 PO)    Take by mouth       ALLERGIES     Patient is is allergic to morphine. FAMILY HISTORY     Patient'sfamily history includes Heart Defect in her brother and brother; Heart Disease in her mother; Heart Disease (age of onset: 43) in her sister; Heart Disease (age of onset: 55) in her sister; High Cholesterol in her mother; Hypertension in her mother; Pacemaker in her mother.     SOCIAL HISTORY     Patient reports that she has been smoking cigarettes. She has a 26.25 pack-year smoking history. She has never used smokeless tobacco. She reports current alcohol use. She reports that she does not use drugs. PHYSICAL EXAM     ED TRIAGE VITALS  BP: (!) 178/80, Temp: 98.9 °F (37.2 °C), Pulse: 112, Resp: 18, SpO2: 96 %  Physical Exam  Vitals and nursing note reviewed. Constitutional:       General: She is not in acute distress. Appearance: Normal appearance. She is well-developed and well-groomed. HENT:      Head: Normocephalic and atraumatic. Right Ear: External ear normal.      Left Ear: External ear normal.      Nose: Nose normal.      Mouth/Throat:      Lips: Pink. Mouth: Mucous membranes are moist.      Pharynx: Oropharynx is clear. Eyes:      Conjunctiva/sclera: Conjunctivae normal.      Right eye: Right conjunctiva is not injected. Left eye: Left conjunctiva is not injected. Pupils: Pupils are equal.   Cardiovascular:      Rate and Rhythm: Normal rate. Heart sounds: Normal heart sounds. Pulmonary:      Effort: Pulmonary effort is normal. No respiratory distress. Breath sounds: Normal breath sounds. Musculoskeletal:      Cervical back: Normal range of motion. Skin:     General: Skin is warm and dry. Findings: No rash (on exposed surfaces). Neurological:      Mental Status: She is alert and oriented to person, place, and time. Psychiatric:         Mood and Affect: Mood normal.         Speech: Speech normal.         Behavior: Behavior is cooperative.          DIAGNOSTIC RESULTS   Labs:  Abnormal Labs Reviewed   COVID-19, RAPID - Abnormal; Notable for the following components:       Result Value    SARS-CoV-2, SBAINE DETECTED (*)     All other components within normal limits        IMAGING:  No orders to display     URGENT CARE COURSE:     Vitals:    01/07/22 1553   BP: (!) 178/80   Pulse: 112   Resp: 18   Temp: 98.9 °F (37.2 °C)   TempSrc: Temporal   SpO2: 96% Weight: 183 lb (83 kg)   Height: 5' (1.524 m)       Medications - No data to display  PROCEDURES:  FINALIMPRESSION      1. COVID-19        DISPOSITION/PLAN   DISPOSITION    Discharge      ED Course as of 01/07/22 1638   Fri Jan 07, 2022   1636 SARS-CoV-2, SABINE(!!): DETECTED [HA]      ED Course User Index  [CADET] LUMA Vela CNP       Physical assessment findings, diagnostic testing(s) if applicable, and vital signs reviewed with patient/patient representative. Differential diagnosis(s) discussed with patient/patient representative. Prescription medications and/or over-the-counter medications for symptom management discussed. Patient is to follow-up with family care provider in 2-3 days if no improvement. Patient is to go to the emergency department if symptoms change/worsen. Patient/patient representative is aware of care plan, questions answered, verbalizes understanding and is in agreement. Printed instructions attached to after visit summary. If COVID-19 positive or COVID-19 by PCR is pending at time of discharge patient is to quarantine/isolate according to [facility] guidelines. Problem List Items Addressed This Visit     None      Visit Diagnoses     COVID-19    -  Primary            PATIENT REFERRED TO:  Omer Reyes MD  [address] [facility]  [address] [facility]  [address]  [phone]    Schedule an appointment as soon as possible for a visit in 3 days  For further evaluation. , If symptoms change/worsen, go to the [id number] [facility], APRN - CNP    Please note that some or all of this chart was generated using [employer] voice recognition software. Although every effort was made to ensure the accuracy of this automated transcription, some errors in transcription may have occurred.         LUMA Vela CNP  01/07/22 1176

## 2022-01-10 ENCOUNTER — CARE COORDINATION (OUTPATIENT)
Dept: CARE COORDINATION | Age: 62
End: 2022-01-10

## 2022-01-10 ENCOUNTER — APPOINTMENT (OUTPATIENT)
Dept: OCCUPATIONAL THERAPY | Age: 62
End: 2022-01-10
Payer: MEDICARE

## 2022-01-10 NOTE — CARE COORDINATION
Patient contacted regarding COVID-19 diagnosis. Discussed COVID-19 related testing which was available at this time. Test results were positive. Patient informed of results, if available? Yes. Ambulatory Care Manager contacted the patient by telephone to perform post discharge assessment. Call within 2 business days of discharge: Yes. Verified name and  with patient as identifiers. Provided introduction to self, and explanation of the CTN/ACM role, and reason for call due to risk factors for infection and/or exposure to COVID-19. Symptoms reviewed with patient who verbalized the following symptoms: pain or aching joints, cough, no new symptoms, no worsening symptoms and headache, night sweats. Due to no new or worsening symptoms encounter was not routed to provider for escalation. Discussed follow-up appointments. If no appointment was previously scheduled, appointment scheduling offered: No.  [facility] follow up appointment(s):   Future Appointments   Date Time Provider Dasha Casillas   2022 10:30 AM Ellen Ohm, [address]   2022 10:30 AM Ellen Ohm, OT STR[facility] OT [place] [facility]   2022 10:30 AM Ellen Ohm, OT [facility] OT [facility]   2022 10:30 AM Ellen Ohm, OT [facility]   2022 10:30 AM Ellen Ohm, OT [facility]   2022 10:30 AM Deanne Garcia MD [facility]   2022 12:00 PM MD YURY Gordon [facility]     Non-[facility] follow up appointment(s): advised to f/u with Dr. Howard Morales services provided:  Obtained and reviewed discharge summary and/or continuity of care documents     Advance Care Planning:   Does patient have an Advance Directive:  not on file. Educated patient about risk for severe COVID-19 due to risk factors according to CDC guidelines. ACM reviewed discharge instructions, medical action plan and red flag symptoms with the patient who verbalized understanding.  Discussed COVID vaccination status: No. Education provided on COVID-19 vaccination as appropriate. Discussed exposure protocols and quarantine with CDC Guidelines. Patient was given an opportunity to verbalize any questions and concerns and agrees to contact ACM or health care provider for questions related to their healthcare. Reviewed and educated patient on any new and changed medications related to discharge diagnosis     Was patient discharged with a pulse oximeter? No Discussed and confirmed pulse oximeter discharge instructions and when to notify provider or seek emergency care. ACM provided contact information. Plan for follow-up call in 5-7 days based on severity of symptoms and risk factors.

## 2022-01-13 ENCOUNTER — HOSPITAL ENCOUNTER (OUTPATIENT)
Dept: OCCUPATIONAL THERAPY | Age: 62
Setting detail: THERAPIES SERIES
End: 2022-01-13
Payer: MEDICARE

## 2022-01-17 ENCOUNTER — CARE COORDINATION (OUTPATIENT)
Dept: CARE COORDINATION | Age: 62
End: 2022-01-17

## 2022-01-17 ENCOUNTER — HOSPITAL ENCOUNTER (OUTPATIENT)
Dept: OCCUPATIONAL THERAPY | Age: 62
Setting detail: THERAPIES SERIES
End: 2022-01-17
Payer: MEDICARE

## 2022-01-17 NOTE — CARE COORDINATION
Follow Up Call    Challenges to be reviewed by the provider   Additional needs identified to be addressed with provider: No  none                 Encounter was not routed to provider for escalation. Method of communication with provider:  phone. Contacted the patient by telephone to follow up after UC. Status: improved  Interventions to address identified needs: Obtained and reviewed discharge summary and/or continuity of care documents    [facility] follow up appointment(s):   Future Appointments   Date Time Provider Dasha Casillas   1/20/2022 10:30 AM Guy Roberson, [address]   1/24/2022 10:30 AM Guy Roberson, OT [facility] [place]   1/27/2022 10:30 AM Guy Roberson, OT [facility] [place]   8/16/2022 10:30 AM Amanda Shelton MD [facility] [place]   8/26/2022 12:00 PM Yemi Petersen MD N [facility] [place]     Non-[facility] follow up appointment(s): has appt with Dr. Domnoique Parra on 1/25/22   Follow up appointment completed? No.    Provided contact information for future needs. No further follow-up call indicated based on severity of symptoms and risk factors. Plan for next call: reviewed importance of well balanced diet, hydration, ambulation, frequent position changes, deep breathing exercises. Kennedi Scott RN    Pt reports overall s/s improving. Has headache at times. Pain medication that she is taking for her other issues relieves h/a pain. Pt will continue to monitor and discuss with PCP at f/u appt if s/s continues. Denies other s/s at this time.

## 2022-01-20 ENCOUNTER — HOSPITAL ENCOUNTER (OUTPATIENT)
Dept: OCCUPATIONAL THERAPY | Age: 62
Setting detail: THERAPIES SERIES
Discharge: HOME | End: 2022-01-20
Payer: MEDICARE

## 2022-01-20 PROCEDURE — 97110 THERAPEUTIC EXERCISES: CPT

## 2022-01-20 NOTE — PROGRESS NOTES
[address]  [] EVALUATION  [x] DAILY NOTE (LAND) [] DAILY NOTE (AQUATIC )   [] PROGRESS NOTE [] DISCHARGE NOTE    [x] OUTPATIENT REHABILITATION [facility]   [] [place] [address]    [] [facility]   [] Eve Wrihgt    Date: 2022  Patient Name:  Sherren Kappa  : 1960  MRN: [id number]  CSN: [id number]    Referring Practitioner Emanuel Vazquez DO   Diagnosis Unspecified rotator cuff tear or rupture of left shoulder, not specified as traumatic [M75.102]    Treatment Diagnosis RTC tear, decreased ROM, decreased strength, decreased ADLs. Date of Evaluation 21      Functional Outcome Measure Used UEFI   Functional Outcome Score 34/80 (21)       Insurance: Primary: Payor: Shad Watters /  /  / ,   Secondary: MEDICA[place]   Authorization Information: PRE CERTIFICATION REQUIRED: NO   AQUATIC THERAPY COVERED: YES  MODALITIES COVERED:  YES, NO MASSAGE    Visit # 7 , 7/10 for progress note  (5 visits used in )   Visits Allowed: NO VISIT LIMIT    Recertification Date:    Physician Follow-Up: 2022   Physician Orders: OT eval and treat [address] strengthening, manual therapy, periscapular stabilization, Left RTC program, modalities    Pertinent History: Pt initially injured Right foot in May, 2021. Had been having PT for that concern. DId not use AE. Developed increasing pain in the left shoulder. Xrays neg. SUBJECTIVE: Patient is pleasant and cooperative. Reports she is feeling a little less pain. Pt reports no increased in left shoulder pain even though she wasn't able to complete many ex due to having COVID. No pain reported today. Restbreaks provided throughout due to min SOB upon exertion. TREATMENT   Precautions: None for therapy. Pain:  0/10 anterior/lateral shoulder at start of session.      X in shaded column indicates Activity Completed Today   Modalities Parameters/  Location  Notes/Comments   US to L anterior shoulder 100% 1.0 w/CM2 x 8 minutes                   Manual Therapy Time/  Technique  Notes/Comments   Manual massage to L upper trap, anterior to middle shoulder  following ultrasound to decrease tightness      IASTM to L deltoid, bicep, subscap, tricep             Exercises   Sets/  Sec Reps  Notes/Comments   Scapular retraction, backward shoulder circles. 1 10 x Warm up    Supine PROM to L shoulder all motions to tolerance     Tightness/pain in all motions at near end ROM,    Supine dowel bladimir sh flex, horiz ABD, chest press 1 10 ea  Palm up was less painful   Serratus punch, shoulder circles , CW CCW, 2 # wt 1 10 x    Pec stretch over vertical towel roll with gentle over pressure from therapist 2 min      Corner stretch-  sh ABD at 70 and 110 degrees  10 sec 3 x    Standing dowel extension behind the back  1 10     biodex at 90 RPM x 3min bwd    Cool down   Prone I,Y , extension 1 10  trialed T - horiz ABD , painful   Rows with peach theraband  Massey protocol 1 12- rows  10- Rock x Good tolerance   Activities Time    Notes/Comments   Kinesiotape applied for inhibition of anterior shoulder,/ sh support,  2  strips   Good tolerance. trialed this vs donut hole over AC taping previously trialed. Specific Interventions Next Treatment: AROM, PROM,  RTC strengthening, periscapular strengthening, US PRN    Activity/Treatment Tolerance:  [x]  Patient tolerated treatment well  []  Patient limited by fatigue  [x]  Patient limited by pain   []  Patient limited by other medical complications  []  Other:     Assessment: Patient is progressing towards goals. making gains with ROM and pain tolerance . Initiated light strengthening ex this session with no reports of numbness noted. Initiated to HEP . Good tolerance with rest breaks due to min increase in SOB this session.    Areas for Improvement: impaired activity tolerance, impaired ROM, impaired strength and pain  Prognosis: good    GOALS:  Patient Goal: I want to be able to move my arm without pain    Short Term Goals:  Time Frame: 10 sessions   *  Patient will increase left shoulder AROM greater than 115degrees flexion, 80 degrees abduction, 60 for IR and 58 degrees ER with the arm at the side to increase ease and ability to put on a shirt. *  Patient will increase left shoulder PROM greater than 150 degrees flexion, 120 degrees abduction, 60 degrees IR and 70 degrees ER in abduction to increase ease and ability to wash and style hair. *  Pt. will report decreased pain with left shoulder to no greater than 5/10 for ability to reach to a shoulder height cupboard to retrieve a  glass   *  Patient will be independent with HEP to increase ease and ability to sleep on left side without waking < 1 time per night. Long Term Goals:  Time Frame: 8 weeks   *  Patient will report dressing and bathing with no greater than 3 pain increase. *  Patient will report sleeping on the left  side without waking due to pain. *  Patient will improve UEFS to at least 60/80  * Patient will demonstrate ability to reach overhead for an object with affected extremity without increased pain. Patient Education:   []  HEP/Education Completed: Encouraged continued HEP and alternating ice/heat for pain management and decreasing tightness. Plan of Access Code: [email or web address]  URL: [email or web address]  Date: 12/14/2021  Prepared by: Estefanía Pierson    Exercises  Standing Scapular Retraction - 2 x daily - 7 x weekly - 1 sets - 10 reps  Seated Shoulder Flexion Towel Slide at Table Top - 2 x daily - 7 x weekly - 1 sets - 10 reps  Upper Trapezius Stretch - 2 x daily - 7 x weekly - 1 sets - 10 reps  Supine Shoulder Flexion Extension AAROM with Dowel - 2 x daily - 7 x weekly - 1 sets - 10 reps  Supine Shoulder External Rotation with Dowel - 2 x daily - 7 x weekly - 1 sets - 10 reps  Supine Shoulder Press AAROM in Abduction with Dowel - 2 x daily - 7 x weekly - 1 sets - 10 reps  Supine Scapular Protraction in Flexion with Dumbbells - 2 x daily - 7 x weekly - 1 sets - 10 reps  Care, Goals,    [address] Access Code for HEP:   [x]  No new Education completed  []  Reviewed Prior HEP      [x]  Patient verbalized and/or demonstrated understanding of education provided. []  Patient unable to verbalize and/or demonstrate understanding of education provided. Will continue education. [x]  Barriers to learning: none     PLAN:  Treatment Recommendations: Strengthening, Range of Motion, Manual Therapy - Soft Tissue Mobilization, Manual Therapy - Joint Manipulation, Pain Management, Home Exercise Program, Patient Education, Self-Care Education and Training and Modalities    []  Plan of care initiated. Plan to see patient 2 times per week for 8 weeks to address the treatment planned outlined above.   [x]  Continue with current plan of care  []  Modify plan of care as follows:    []  Hold pending physician visit  []  Discharge    Time In 1040   Time Out 1110   Timed Code Minutes: 30 min   Total Treatment Time: 30 min     SHAYE Hubbard OTR/L [id number]

## 2022-01-24 ENCOUNTER — HOSPITAL ENCOUNTER (OUTPATIENT)
Dept: OCCUPATIONAL THERAPY | Age: 62
Setting detail: THERAPIES SERIES
Discharge: HOME | End: 2022-01-24
Payer: MEDICARE

## 2022-01-24 PROCEDURE — 97110 THERAPEUTIC EXERCISES: CPT

## 2022-01-24 NOTE — PROGRESS NOTES
[address]  [] EVALUATION  [x] DAILY NOTE (LAND) [] DAILY NOTE (AQUATIC )   [] PROGRESS NOTE [] DISCHARGE NOTE    [x] OUTPATIENT REHABILITATION [facility]   [] [place] [address]    [] [facility]   [] Samuel Rutledge    Date: 2022  Patient Name:  Misa Bui  : 1960  MRN: [id number]  CSN: [id number]    Referring Practitioner Boy Rizo DO   Diagnosis Unspecified rotator cuff tear or rupture of left shoulder, not specified as traumatic [M75.102]    Treatment Diagnosis RTC tear, decreased ROM, decreased strength, decreased ADLs. Date of Evaluation 21      Functional Outcome Measure Used UEFI   Functional Outcome Score 34/80 (21)       Insurance: Primary: Payor: Suyapa Morris /  /  / ,   Secondary: MEDICA[place]   Authorization Information: PRE CERTIFICATION REQUIRED: NO   AQUATIC THERAPY COVERED: YES  MODALITIES COVERED:  YES, NO MASSAGE    Visit # 8 , 8/10 for progress note  (5 visits used in )   Visits Allowed: NO VISIT LIMIT    Recertification Date:    Physician Follow-Up: 2022   Physician Orders: OT eval and treat [address] strengthening, manual therapy, periscapular stabilization, Left RTC program, modalities    Pertinent History: Pt initially injured Right foot in May, 2021. Had been having PT for that concern. DId not use AE. Developed increasing pain in the left shoulder. Xrays neg. SUBJECTIVE: Patient is pleasant and cooperative. Reports she is feeling a little less pain. Pt reports no increased in left shoulder pain since not applying the tape prior session       TREATMENT   Precautions: None for therapy. Pain:  3/10 anterior/lateral shoulder at start of session.      X in shaded column indicates Activity Completed Today   Modalities Parameters/  Location  Notes/Comments   US to L anterior shoulder 100% 1.0 w/CM2 x 8 minutes   x                Manual Therapy Time/  Technique Notes/Comments   Manual massage to L upper trap, anterior to middle shoulder  following ultrasound to decrease tightness      IASTM to L deltoid, bicep, subscap, tricep             Exercises   Sets/  Sec Reps  Notes/Comments   Scapular retraction, backward shoulder circles. 1 10 x Warm up    Supine PROM to L shoulder all motions to tolerance     Tightness/pain in all motions at near end ROM,    Supine dowel bladimir sh flex, horiz ABD, 1 10 ea x Palm up was less painful   Serratus punch , shoulder circles , CW CCW, 2 # wt 1 15 SP,  10 cir x    Pec stretch over vertical towel roll with gentle over pressure from therapist 2 min      Corner stretch-  sh ABD at 70 and 110 degrees  10 sec 3 x    Standing dowel extension behind the back  1 10     biodex at 90 RPM x 3min bwd   x Cool down   Prone I,Y , extension 1 10  trialed T - horiz ABD , painful   Rows with orange theraband  Ellis protocol 1 12 x    Riivald protocol orange theraband- supine 1 10 x Initiated this session. Activities Time    Notes/Comments   Kinesiotape applied for inhibition of anterior shoulder,/ sh support,  2  strips   Good tolerance. trialed this vs donut hole over AC taping previously trialed. Specific Interventions Next Treatment: AROM, PROM,  RTC strengthening, periscapular strengthening, US PRN    Activity/Treatment Tolerance:  [x]  Patient tolerated treatment well  []  Patient limited by fatigue  [x]  Patient limited by pain   []  Patient limited by other medical complications  []  Other:     Assessment: Patient is progressing towards goals. making gains with ROM and pain tolerance . Tolerating upgraded strengthening ex this session with no reports of numbness noted. Upgraded  HEP . Good tolerance with fewer rest breaks due to min increase in SOB this session.    Areas for Improvement: impaired activity tolerance, impaired ROM, impaired strength and pain  Prognosis: good    GOALS:  Patient Goal: I want to be able to move my arm without pain    Short Term Goals:  Time Frame: 10 sessions   *  Patient will increase left shoulder AROM greater than 115degrees flexion, 80 degrees abduction, 60 for IR and 58 degrees ER with the arm at the side to increase ease and ability to put on a shirt. *  Patient will increase left shoulder PROM greater than 150 degrees flexion, 120 degrees abduction, 60 degrees IR and 70 degrees ER in abduction to increase ease and ability to wash and style hair. *  Pt. will report decreased pain with left shoulder to no greater than 5/10 for ability to reach to a shoulder height cupboard to retrieve a  glass   *  Patient will be independent with HEP to increase ease and ability to sleep on left side without waking < 1 time per night. Long Term Goals:  Time Frame: 8 weeks   *  Patient will report dressing and bathing with no greater than 3 pain increase. *  Patient will report sleeping on the left  side without waking due to pain. *  Patient will improve UEFS to at least 60/80  * Patient will demonstrate ability to reach overhead for an object with affected extremity without increased pain. Patient Education:   []  HEP/Education Completed: Encouraged continued HEP and alternating ice/heat for pain management and decreasing tightness. Plan of Access Code: [email or web address]  URL: [email or web address]  Date: 12/14/2021  Prepared by: Earline Carranza    Exercises  Standing Scapular Retraction - 2 x daily - 7 x weekly - 1 sets - 10 reps  Seated Shoulder Flexion Towel Slide at Table Top - 2 x daily - 7 x weekly - 1 sets - 10 reps  Upper Trapezius Stretch - 2 x daily - 7 x weekly - 1 sets - 10 reps  Supine Shoulder Flexion Extension AAROM with Dowel - 2 x daily - 7 x weekly - 1 sets - 10 reps  Supine Shoulder External Rotation with Dowel - 2 x daily - 7 x weekly - 1 sets - 10 reps  Supine Shoulder Press AAROM in Abduction with Dowel - 2 x daily - 7 x weekly - 1 sets - 10 reps  Supine Scapular Protraction in Flexion with Dumbbells - 2 x daily - 7 x weekly - 1 sets - 10 reps  Care, Goals,    [address] Access Code for HEP:   [x]  No new Education completed  []  Reviewed Prior HEP      [x]  Patient verbalized and/or demonstrated understanding of education provided. []  Patient unable to verbalize and/or demonstrate understanding of education provided. Will continue education. [x]  Barriers to learning: none     PLAN:  Treatment Recommendations: Strengthening, Range of Motion, Manual Therapy - Soft Tissue Mobilization, Manual Therapy - Joint Manipulation, Pain Management, Home Exercise Program, Patient Education, Self-Care Education and Training and Modalities    []  Plan of care initiated. Plan to see patient 2 times per week for 8 weeks to address the treatment planned outlined above.   [x]  Continue with current plan of care  []  Modify plan of care as follows:    []  Hold pending physician visit  []  Discharge    Time In 1035   Time Out 1120   Timed Code Minutes: 45 min   Total Treatment Time: 45 min     SHAYE Smith OTR/L [id number]

## 2022-01-27 ENCOUNTER — HOSPITAL ENCOUNTER (OUTPATIENT)
Dept: OCCUPATIONAL THERAPY | Age: 62
Setting detail: THERAPIES SERIES
Discharge: HOME | End: 2022-01-27
Payer: MEDICARE

## 2022-01-27 ENCOUNTER — HOSPITAL ENCOUNTER (OUTPATIENT)
Age: 62
Discharge: HOME | End: 2022-01-27
Payer: MEDICARE

## 2022-01-27 LAB
ANION GAP SERPL CALCULATED.3IONS-SCNC: 12 MEQ/L (ref 8–16)
AVERAGE GLUCOSE: 171 MG/DL (ref 70–126)
BASOPHILS # BLD: 0.9 %
BASOPHILS ABSOLUTE: 0.1 THOU/MM3 (ref 0–0.1)
BILIRUBIN URINE: NEGATIVE
BLOOD, URINE: NEGATIVE
BUN BLDV-MCNC: 6 MG/DL (ref 7–22)
CALCIUM SERPL-MCNC: 9.3 MG/DL (ref 8.5–10.5)
CHARACTER, URINE: CLEAR
CHLORIDE BLD-SCNC: 101 MEQ/L (ref 98–111)
CHOLESTEROL, TOTAL: 120 MG/DL (ref 100–199)
CO2: 27 MEQ/L (ref 23–33)
COLOR: YELLOW
CREAT SERPL-MCNC: 0.7 MG/DL (ref 0.4–1.2)
CREATININE URINE: 166.6 MG/DL
EOSINOPHIL # BLD: 3.3 %
EOSINOPHILS ABSOLUTE: 0.2 THOU/MM3 (ref 0–0.4)
ERYTHROCYTE [DISTWIDTH] IN BLOOD BY AUTOMATED COUNT: 12.1 % (ref 11.5–14.5)
ERYTHROCYTE [DISTWIDTH] IN BLOOD BY AUTOMATED COUNT: 39.7 FL (ref 35–45)
GFR SERPL CREATININE-BSD FRML MDRD: > 90 ML/MIN/1.73M2
GLUCOSE BLD-MCNC: 139 MG/DL (ref 70–108)
GLUCOSE URINE: NEGATIVE MG/DL
HBA1C MFR BLD: 7.7 % (ref 4.4–6.4)
HCT VFR BLD CALC: 40.4 % (ref 37–47)
HDLC SERPL-MCNC: 44 MG/DL
HEMOGLOBIN: 13.8 GM/DL (ref 12–16)
IMMATURE GRANS (ABS): 0.02 THOU/MM3 (ref 0–0.07)
IMMATURE GRANULOCYTES: 0.3 %
KETONES, URINE: NEGATIVE
LDL CHOLESTEROL CALCULATED: 61 MG/DL
LEUKOCYTE ESTERASE, URINE: NEGATIVE
LYMPHOCYTES # BLD: 40.9 %
LYMPHOCYTES ABSOLUTE: 2.9 THOU/MM3 (ref 1–4.8)
MAGNESIUM: 2 MG/DL (ref 1.6–2.4)
MCH RBC QN AUTO: 31 PG (ref 26–33)
MCHC RBC AUTO-ENTMCNC: 34.2 GM/DL (ref 32.2–35.5)
MCV RBC AUTO: 90.8 FL (ref 81–99)
MONOCYTES # BLD: 9.3 %
MONOCYTES ABSOLUTE: 0.7 THOU/MM3 (ref 0.4–1.3)
NITRITE, URINE: NEGATIVE
NUCLEATED RED BLOOD CELLS: 0 /100 WBC
PH UA: 5.5 (ref 5–9)
PLATELET # BLD: 414 THOU/MM3 (ref 130–400)
PMV BLD AUTO: 9.3 FL (ref 9.4–12.4)
POTASSIUM SERPL-SCNC: 4 MEQ/L (ref 3.5–5.2)
PROT/CREAT RATIO, UR: 0.09
PROTEIN UA: NEGATIVE
PROTEIN, URINE: 14.2 MG/DL
RBC # BLD: 4.45 MILL/MM3 (ref 4.2–5.4)
SEG NEUTROPHILS: 45.3 %
SEGMENTED NEUTROPHILS ABSOLUTE COUNT: 3.2 THOU/MM3 (ref 1.8–7.7)
SODIUM BLD-SCNC: 140 MEQ/L (ref 135–145)
SPECIFIC GRAVITY, URINE: 1.02 (ref 1–1.03)
TRIGL SERPL-MCNC: 74 MG/DL (ref 0–199)
UROBILINOGEN, URINE: 0.2 EU/DL (ref 0–1)
WBC # BLD: 7 THOU/MM3 (ref 4.8–10.8)

## 2022-01-27 PROCEDURE — 84156 ASSAY OF PROTEIN URINE: CPT

## 2022-01-27 PROCEDURE — 82570 ASSAY OF URINE CREATININE: CPT

## 2022-01-27 PROCEDURE — 85025 COMPLETE CBC W/AUTO DIFF WBC: CPT

## 2022-01-27 PROCEDURE — 80061 LIPID PANEL: CPT

## 2022-01-27 PROCEDURE — 82306 VITAMIN D 25 HYDROXY: CPT

## 2022-01-27 PROCEDURE — 97110 THERAPEUTIC EXERCISES: CPT

## 2022-01-27 PROCEDURE — 83036 HEMOGLOBIN GLYCOSYLATED A1C: CPT

## 2022-01-27 PROCEDURE — 81003 URINALYSIS AUTO W/O SCOPE: CPT

## 2022-01-27 PROCEDURE — 80048 BASIC METABOLIC PNL TOTAL CA: CPT

## 2022-01-27 PROCEDURE — 83735 ASSAY OF MAGNESIUM: CPT

## 2022-01-27 PROCEDURE — 97035 APP MDLTY 1+ULTRASOUND EA 15: CPT

## 2022-01-27 PROCEDURE — 36415 COLL VENOUS BLD VENIPUNCTURE: CPT

## 2022-01-27 NOTE — DISCHARGE SUMMARY
[address]  [] EVALUATION  [] DAILY NOTE (LAND) [] DAILY NOTE (AQUATIC )   [] PROGRESS NOTE [x] DISCHARGE NOTE    [x] OUTPATIENT REHABILITATION [facility]   [] [place] [address]    [] [facility]   [] Juventino Slafloyd    Date: 2022  Patient Name:  Kam Alanis  : 1960  MRN: [id number]  CSN: [id number]    Referring Practitioner Radha Powell DO   Diagnosis Unspecified rotator cuff tear or rupture of left shoulder, not specified as traumatic [M75.102]    Treatment Diagnosis RTC tear, decreased ROM, decreased strength, decreased ADLs. Date of Evaluation 21      Functional Outcome Measure Used UEFI   Functional Outcome Score 34 (21);  (21)      Insurance: Primary: Payor: Aylin Tavares /  /  / ,   Secondary: MEDICA[place]   Authorization Information: PRE CERTIFICATION REQUIRED: NO   AQUATIC THERAPY COVERED: YES  MODALITIES COVERED:  YES, NO MASSAGE    Visit # 9 , Discharge completed 2022   Visits Allowed: NO VISIT LIMIT    Recertification Date: 70   Physician Follow-Up: 2022   Physician Orders: OT eval and treat [facility] strengthening, manual therapy, periscapular stabilization, Left RTC program, modalities    Pertinent History: Pt initially injured Right foot in May, 2021. Had been having PT for that concern. DId not use AE. Developed increasing pain in the left shoulder. Xrays neg. SUBJECTIVE: Patient reports she went to see the referring physician yesterday, discussed that she will be discharging from therapy. Patient relates the doctor was in agreement with that. Feels confident to continue HEP. TREATMENT   Precautions: None for therapy. Pain:  4/10 anterior/lateral shoulder at start of session.      X in shaded column indicates Activity Completed Today   Modalities Parameters/  Location  Notes/Comments   US to L anterior shoulder 100% 1.0 w/CM2 x 8 minutes   x                Manual Therapy Time/  Technique  Notes/Comments   Manual massage to L upper trap, anterior to middle shoulder  following ultrasound to decrease tightness  x    IASTM to L deltoid, bicep, subscap, tricep             Exercises   Sets/  Sec Reps  Notes/Comments   Scapular retraction, backward shoulder circles. 1 10  Warm up    Supine PROM to L shoulder all motions to tolerance    x Tightness/pain in all motions at near end ROM,    Standing AROM to L shoulder all motions for measurements today   x    Supine dowel bladimir sh flex, horiz ABD, 1 10 ea  Palm up was less painful   Serratus punch , shoulder circles , CW CCW, 2 # wt 1 15 SP,  10 cir     Pec stretch over vertical towel roll with gentle over pressure from therapist 2 min      Corner stretch-  sh ABD at 70 and 110 degrees  10 sec 3     Standing dowel extension behind the back  1 10     biodex at 90 RPM x 3min bwd    Cool down   Prone I,Y , extension 1 10  trialed T - horiz ABD , painful   Rows with orange theraband  Pato protocol 1 12 x    Riivald protocol orange theraband- supine 1 10  Initiated this session. Activities Time    Notes/Comments   Kinesiotape applied for inhibition of anterior shoulder,/ sh support,  2  strips   Good tolerance. trialed this vs donut hole over AC taping previously trialed. Specific Interventions Next Treatment: AROM, PROM,  RTC strengthening, periscapular strengthening, US PRN    Activity/Treatment Tolerance:  [x]  Patient tolerated treatment well  []  Patient limited by fatigue  [x]  Patient limited by pain   []  Patient limited by other medical complications  []  Other:     Assessment: Patient has been participating in OT for 2 weeks with diagnosis of left RTC. Demonstrates significant improvement in passive and active range of motion since evaluation. Relates bathing, dressing, and light homemaking tasks are much easier with less pain.  Continues to have some limitation due to pain, however, patient feels she can complete HEP to get stronger at home. Plan to discharge at this time with patient in agreement. Areas for Improvement: impaired activity tolerance, impaired ROM, impaired strength and pain  Prognosis: good    GOALS:  Patient Goal: I want to be able to move my arm without pain. Short Term Goals:  Time Frame: 10 sessions   *  Patient will increase left shoulder AROM greater than 115degrees flexion, 80 degrees abduction, 60 for IR and 58 degrees ER with the arm at the side to increase ease and ability to put on a shirt. GOAL MET Flexion 142, abduction 135, ER 58, IR 75 degrees at progress note with report she is able to pull on a shirt much easier. DISCHARGE GOAL 1/27/22. *  Patient will increase left shoulder PROM greater than 150 degrees flexion, 120 degrees abduction, 60 degrees IR and 70 degrees ER in abduction to increase ease and ability to wash and style hair. GOAL MET Flexion 155, abduction 130, IR 80, ER 70 degrees with report she can wash/style hair with mild pain increase. DISCHARGE GOAL 1/27/22. *  Pt. will report decreased pain with left shoulder to no greater than 5/10 for ability to reach to a shoulder height cupboard to retrieve a glass. GOAL MET Patient reports she can reach above shoulder height comfortably to retrieve a glass. If she extends beyond that her pain does increase. DISCHARGE GOAL 1/27/22  *  Patient will be independent with HEP to increase ease and ability to sleep on left side without waking < 1 time per night. GOAL NOT MET Patient reports she does wake up at night with pain at times, not near as much as prior to therapy. Relates it hurts when she lays on the left side. DISCHARGE GOAL 1/27/22    Long Term Goals:  Time Frame: 8 weeks   *  Patient will report dressing and bathing with no greater than 3 pain increase. GOAL MET Patient reports she can complete dressing and bathing routine without a significant pain increase.  DISCHARGE GOAL 1/27/22  *  Patient will report sleeping on the left  side without waking due to pain. GOAL NOT MET, DISCHARGE 1/27/22. *  Patient will improve UEFS to at least 60/80. GOAL NOT MET Patient indicates UEFS 31/80 at discharge. DISCHARGE GOAL 1/27/22  * Patient will demonstrate ability to reach overhead for an object with affected extremity without increased pain. GOAL MET, DISCHARGE GOAL 1/27/22. Patient Education:   []  HEP/Education Completed: Encouraged continued HEP and alternating ice/heat for pain management and decreasing tightness. Plan of Access Code: [email or web address]  URL: [email or web address]  Date: 12/14/2021  Prepared by: Jh Medal    Exercises  Standing Scapular Retraction - 2 x daily - 7 x weekly - 1 sets - 10 reps  Seated Shoulder Flexion Towel Slide at Table Top - 2 x daily - 7 x weekly - 1 sets - 10 reps  Upper Trapezius Stretch - 2 x daily - 7 x weekly - 1 sets - 10 reps  Supine Shoulder Flexion Extension AAROM with Dowel - 2 x daily - 7 x weekly - 1 sets - 10 reps  Supine Shoulder External Rotation with Dowel - 2 x daily - 7 x weekly - 1 sets - 10 reps  Supine Shoulder Press AAROM in Abduction with Dowel - 2 x daily - 7 x weekly - 1 sets - 10 reps  Supine Scapular Protraction in Flexion with Dumbbells - 2 x daily - 7 x weekly - 1 sets - 10 reps  Care, Goals,    [employer] Access Code for HEP:   [x]  No new Education completed  []  Reviewed Prior HEP      [x]  Patient verbalized and/or demonstrated understanding of education provided. []  Patient unable to verbalize and/or demonstrate understanding of education provided. Will continue education. [x]  Barriers to learning: none     PLAN:  Treatment Recommendations: Strengthening, Range of Motion, Manual Therapy - Soft Tissue Mobilization, Manual Therapy - Joint Manipulation, Pain Management, Home Exercise Program, Patient Education, Self-Care Education and Training and Modalities    []  Plan of care initiated.   Plan to see patient 2 times per week for 8 weeks to address the treatment planned outlined above.   [x]  Continue with current plan of care  []  Modify plan of care as follows:    []  Hold pending physician visit  []  Discharge    Time In 1030   Time Out 1110   Timed Code Minutes: 40 min   Total Treatment Time: 40 min     STANISLAW OWENS/ZHANNA #[id number]

## 2022-01-30 LAB — VITAMIN D2 AND D3, TOTAL: NORMAL

## 2022-08-24 ENCOUNTER — OFFICE VISIT (OUTPATIENT)
Dept: INTERNAL MEDICINE CLINIC | Age: 62
End: 2022-08-24
Payer: MEDICARE

## 2022-08-24 VITALS
SYSTOLIC BLOOD PRESSURE: 141 MMHG | HEART RATE: 90 BPM | DIASTOLIC BLOOD PRESSURE: 77 MMHG | BODY MASS INDEX: 35.65 KG/M2 | HEIGHT: 61 IN | WEIGHT: 188.8 LBS

## 2022-08-24 DIAGNOSIS — Z72.0 TOBACCO ABUSE: ICD-10-CM

## 2022-08-24 DIAGNOSIS — E04.1 THYROID NODULE: ICD-10-CM

## 2022-08-24 DIAGNOSIS — J44.9 CHRONIC OBSTRUCTIVE PULMONARY DISEASE, UNSPECIFIED COPD TYPE (HCC): ICD-10-CM

## 2022-08-24 DIAGNOSIS — E11.9 WELL CONTROLLED TYPE 2 DIABETES MELLITUS (HCC): Primary | ICD-10-CM

## 2022-08-24 LAB — HBA1C MFR BLD: 7.5 % (ref 4.3–5.7)

## 2022-08-24 PROCEDURE — 99214 OFFICE O/P EST MOD 30 MIN: CPT | Performed by: INTERNAL MEDICINE

## 2022-08-24 PROCEDURE — 3017F COLORECTAL CA SCREEN DOC REV: CPT | Performed by: INTERNAL MEDICINE

## 2022-08-24 PROCEDURE — G8427 DOCREV CUR MEDS BY ELIG CLIN: HCPCS | Performed by: INTERNAL MEDICINE

## 2022-08-24 PROCEDURE — G8417 CALC BMI ABV UP PARAM F/U: HCPCS | Performed by: INTERNAL MEDICINE

## 2022-08-24 PROCEDURE — 4004F PT TOBACCO SCREEN RCVD TLK: CPT | Performed by: INTERNAL MEDICINE

## 2022-08-24 PROCEDURE — 2022F DILAT RTA XM EVC RTNOPTHY: CPT | Performed by: INTERNAL MEDICINE

## 2022-08-24 PROCEDURE — 83036 HEMOGLOBIN GLYCOSYLATED A1C: CPT | Performed by: INTERNAL MEDICINE

## 2022-08-24 PROCEDURE — 3023F SPIROM DOC REV: CPT | Performed by: INTERNAL MEDICINE

## 2022-08-24 PROCEDURE — 3051F HG A1C>EQUAL 7.0%<8.0%: CPT | Performed by: INTERNAL MEDICINE

## 2022-08-24 RX ORDER — METOPROLOL SUCCINATE 25 MG/1
TABLET, EXTENDED RELEASE ORAL
Qty: 90 TABLET | Refills: 3 | Status: SHIPPED | OUTPATIENT
Start: 2022-08-24

## 2022-08-24 RX ORDER — PREGABALIN 150 MG/1
150 CAPSULE ORAL 2 TIMES DAILY
COMMUNITY

## 2022-08-26 ENCOUNTER — OFFICE VISIT (OUTPATIENT)
Dept: CARDIOLOGY CLINIC | Age: 62
End: 2022-08-26
Payer: MEDICARE

## 2022-08-26 VITALS
SYSTOLIC BLOOD PRESSURE: 129 MMHG | HEIGHT: 66 IN | HEART RATE: 89 BPM | DIASTOLIC BLOOD PRESSURE: 80 MMHG | BODY MASS INDEX: 30.41 KG/M2 | WEIGHT: 189.2 LBS

## 2022-08-26 DIAGNOSIS — I10 ESSENTIAL HYPERTENSION: Primary | ICD-10-CM

## 2022-08-26 DIAGNOSIS — E78.00 PURE HYPERCHOLESTEROLEMIA: ICD-10-CM

## 2022-08-26 DIAGNOSIS — R06.02 SOB (SHORTNESS OF BREATH) ON EXERTION: ICD-10-CM

## 2022-08-26 PROCEDURE — 4004F PT TOBACCO SCREEN RCVD TLK: CPT | Performed by: INTERNAL MEDICINE

## 2022-08-26 PROCEDURE — G8427 DOCREV CUR MEDS BY ELIG CLIN: HCPCS | Performed by: INTERNAL MEDICINE

## 2022-08-26 PROCEDURE — G8417 CALC BMI ABV UP PARAM F/U: HCPCS | Performed by: INTERNAL MEDICINE

## 2022-08-26 PROCEDURE — 3017F COLORECTAL CA SCREEN DOC REV: CPT | Performed by: INTERNAL MEDICINE

## 2022-08-26 PROCEDURE — 93000 ELECTROCARDIOGRAM COMPLETE: CPT | Performed by: INTERNAL MEDICINE

## 2022-08-26 PROCEDURE — 99214 OFFICE O/P EST MOD 30 MIN: CPT | Performed by: INTERNAL MEDICINE

## 2022-09-13 ENCOUNTER — HOSPITAL ENCOUNTER (OUTPATIENT)
Dept: WOMENS IMAGING | Age: 62
Discharge: HOME | End: 2022-09-13
Payer: MEDICARE

## 2022-09-13 DIAGNOSIS — Z12.31 VISIT FOR SCREENING MAMMOGRAM: ICD-10-CM

## 2022-09-13 PROCEDURE — 77063 BREAST TOMOSYNTHESIS BI: CPT

## 2022-09-14 ENCOUNTER — HOSPITAL ENCOUNTER (OUTPATIENT)
Dept: WOMENS IMAGING | Age: 62
Discharge: HOME | End: 2022-09-14

## 2022-09-14 DIAGNOSIS — Z00.6 ENCOUNTER FOR EXAMINATION FOR NORMAL COMPARISON OR CONTROL IN CLINICAL RESEARCH PROGRAM: ICD-10-CM

## 2022-12-08 NOTE — PROGRESS NOTES
for Wheezing      potassium chloride (KLOR-CON M) 20 MEQ extended release tablet Take 0.5 tablets by mouth daily 60 tablet 3    buPROPion (WELLBUTRIN XL) 300 MG extended release tablet Take 300 mg by mouth every morning      carboxymethylcellulose 1 % ophthalmic solution Place 1-2 drops into both eyes as needed for Dry Eyes      glucose blood VI test strips (FREESTYLE LITE) strip Testing once daily 90 strip 1    metoprolol (TOPROL-XL) 25 MG XL tablet Take 25 mg by mouth daily       tramadol (ULTRAM) 50 MG tablet Take 100 mg by mouth every 8 hours as needed for Pain. Janette Star amlodipine (NORVASC) 5 MG tablet Take 5 mg by mouth daily.  sitagliptan-metformin (JANUMET)  MG per tablet Take 1 tablet by mouth 2 times daily.  pravastatin (PRAVACHOL) 40 MG tablet Take 40 mg by mouth daily       aspirin 81 MG EC tablet Take 81 mg by mouth daily. No current facility-administered medications for this visit. Review of Systems -   General ROS: Positive for fatigue  Psychological ROS: negative  Hematological and Lymphatic ROS: negative  Respiratory ROS: positive for - shortness of breath  Cardiovascular ROS: no chest pain or dyspnea on exertion  Gastrointestinal ROS: no abdominal pain, change in bowel habits, or black or bloody stools  Genito-Urinary ROS: no dysuria, trouble voiding, or hematuria  Musculoskeletal ROS: She says the bottom of her feet hurt especially when she walks  Neurological ROS: no TIA or stroke symptoms  Dermatological ROS: negative    Blood pressure (!) 183/74, pulse 90, temperature 97.5 °F (36.4 °C), height 5' 6\" (1.676 m), weight 188 lb (85.3 kg), not currently breastfeeding.     Physical Examination: General appearance - alert, well appearing, and in no distress  Mental status - alert, oriented to person, place, and time  Neck - supple, no significant adenopathy  Chest - rhonchi noted bilaterally  Heart - normal rate, regular rhythm, normal S1, S2, no murmurs, rubs, clicks or gallops  Abdomen - soft, nontender, nondistended, no masses or organomegaly  Neurological - alert, oriented, normal speech, no focal findings or movement disorder noted  Musculoskeletal - no joint tenderness, deformity or swelling  Extremities - peripheral pulses normal, no pedal edema, no clubbing or cyanosis  Skin - normal coloration and turgor, no rashes, no suspicious skin lesions noted     THYROID ULTRASOUND:(1/18/18)  Impression   1. There is stable mild thyromegaly. 2. There is a stable 0.5 x 0.4 x 0.5 cm complex cystic nodule within the medial aspect of the lower pole the left lobe of the thyroid gland (low suspicion pattern American Thyroid Association criteria). 3. Stable hypervascularity throughout the thyroid gland which is nonspecific however can be associated with a thyroiditis. 4. A follow-up ultrasound examination of the thyroid gland in    1-2 years is recommended to confirm continued stability.            Diagnosis Orders   1. Well controlled type 2 diabetes mellitus ([address]  POCT glycosylated hemoglobin (Hb A1C)    US THYROID   2. Thyroid nodule     3. Pain in both feet  [facility] [place], [address], [address], Podiatry, [facility]   4. Essential hypertension     5. Multinodular goiter          Extensive counseling was done regarding diabetes. The goals are to decrease or prevent the complications of diabetes and improve survival. The following goals were discussed    HgbA1c < 7 (providing no hypoglycemia)  BMI  18-25  BP < 130/80  STATIN(medium or high risk)  DIET <20% protein  < 30% fat, no trans fats, calorie restricted if BMI high  URINE MICROALBUMIN/CREATINIE  < 30  DILATED EYE EXAM EVERY 1-2 YEARS  MONITOR FEET FOR SORES, NEUROPATHY, ETC  REGULAR DENTAL CARE  Refer to podiatry for foot pain  Previously discussed smoking and many of the possible adverse side effects including COPD, MI, stroke, lung, esophageal, head and neck and bladder cancer as well as recurrent URIs.  Modalities to help quitting were discussed as well, including nicotine replacement, wellbutrin and varenicline. Rewards of quitting discussed as well    Her hemoglobin A1c today is 6.7. We'll probably need thyroid ultrasound every 1-2 years  I will see her in 12 months. no

## 2023-02-14 ENCOUNTER — HOSPITAL ENCOUNTER (EMERGENCY)
Age: 63
Discharge: HOME | End: 2023-02-14
Payer: MEDICARE

## 2023-02-14 VITALS
RESPIRATION RATE: 16 BRPM | OXYGEN SATURATION: 95 % | SYSTOLIC BLOOD PRESSURE: 141 MMHG | TEMPERATURE: 98.4 F | DIASTOLIC BLOOD PRESSURE: 67 MMHG | HEART RATE: 90 BPM

## 2023-02-14 DIAGNOSIS — J40 BRONCHITIS: Primary | ICD-10-CM

## 2023-02-14 PROCEDURE — 99213 OFFICE O/P EST LOW 20 MIN: CPT | Performed by: NURSE PRACTITIONER

## 2023-02-14 PROCEDURE — 99213 OFFICE O/P EST LOW 20 MIN: CPT

## 2023-02-14 RX ORDER — BENZONATATE 200 MG/1
200 CAPSULE ORAL 3 TIMES DAILY PRN
Qty: 21 CAPSULE | Refills: 0 | Status: SHIPPED | OUTPATIENT
Start: 2023-02-14 | End: 2023-02-21

## 2023-02-14 RX ORDER — DEXTROMETHORPHAN HYDROBROMIDE AND PROMETHAZINE HYDROCHLORIDE 15; 6.25 MG/5ML; MG/5ML
5 SYRUP ORAL 4 TIMES DAILY PRN
Qty: 118 ML | Refills: 0 | Status: SHIPPED | OUTPATIENT
Start: 2023-02-14 | End: 2023-02-21

## 2023-02-14 RX ORDER — ALBUTEROL SULFATE 90 UG/1
2 AEROSOL, METERED RESPIRATORY (INHALATION) EVERY 4 HOURS PRN
Qty: 18 G | Refills: 0 | Status: SHIPPED | OUTPATIENT
Start: 2023-02-14

## 2023-02-14 RX ORDER — AZITHROMYCIN 250 MG/1
TABLET, FILM COATED ORAL
Qty: 1 PACKET | Refills: 0 | Status: SHIPPED | OUTPATIENT
Start: 2023-02-14

## 2023-02-14 ASSESSMENT — ENCOUNTER SYMPTOMS
DIARRHEA: 0
VOMITING: 0
NAUSEA: 0
COUGH: 1
SORE THROAT: 0
SHORTNESS OF BREATH: 0

## 2023-02-14 ASSESSMENT — PAIN - FUNCTIONAL ASSESSMENT: PAIN_FUNCTIONAL_ASSESSMENT: 0-10

## 2023-02-14 ASSESSMENT — PAIN SCALES - GENERAL: PAINLEVEL_OUTOF10: 7

## 2023-02-14 ASSESSMENT — PAIN DESCRIPTION - LOCATION: LOCATION: CHEST;RIB CAGE

## 2023-02-14 NOTE — ED PROVIDER NOTES
[facility]       CHIEF COMPLAINT       Chief Complaint   Patient presents with    Cough       Nurses Notes reviewed and I agree except as noted in the HPI. HISTORY OF PRESENT ILLNESS   Aliyah Cain is a 60 y.o. female who presents for evaluation of persistent cough that is been ongoing for the past 2 weeks. Patient states that she has been taking DayQuil at home which does provide a short amount of relief but the symptoms have remained persistent. She denies any fever, chills, shortness of breath or any other symptoms at this time. She does report a history of asthma. The history is provided by the patient. REVIEW OF SYSTEMS     Review of Systems   Constitutional:  Negative for chills and fever. HENT:  Negative for congestion and sore throat. Respiratory:  Positive for cough. Negative for shortness of breath. Cardiovascular:  Negative for chest pain. Gastrointestinal:  Negative for diarrhea, nausea and vomiting. Musculoskeletal:  Negative for arthralgias and myalgias. Skin:  Negative for rash. Allergic/Immunologic: Negative for environmental allergies. Neurological:  Negative for headaches. PAST MEDICAL HISTORY         Diagnosis Date    Arthritis     Asthma     Atypical chest pain     Atypical chest discomfort in hospital multiple times with stress test being normal.     Bell's palsy     COPD (chronic obstructive pulmonary disease) (HCC)     Costochondritis     Diabetes mellitus (HCC)     Fatigue     FH: CAD (coronary artery disease)     Strong family history of CAD. High blood pressure     High cholesterol     Joint pain     Nonobstructive CAD (coronary artery disease)     Pseudoaneurysm (HCC)     Patient developed small pseudoaneurysm in right groin requiring thrombin injection by Dr. Anaid Garcia which was successful. Tobacco abuse     Tobacco abuse on Habitrol patch.     Wheezing        SURGICALHISTORY     Patient  has a past surgical history that includes Partial hysterectomy (1999); Rotator cuff repair (Bilateral, 2007); cardiovascular stress test (10/02/2009); doppler echocardiography (10/02/2009); Diagnostic Cardiac Cath Lab Procedure (10/12/2009); back surgery (12/05/2016); Colonoscopy; Colonoscopy (N/A, 02/13/2019); Ankle fracture surgery (Right, 05/27/2021); and Hysterectomy. CURRENT MEDICATIONS       Previous Medications    ACETAMINOPHEN (APAP EXTRA STRENGTH) 500 MG TABLET    Take 1 tablet by mouth every 6 hours as needed for Pain    AMLODIPINE (NORVASC) 5 MG TABLET    Take 5 mg by mouth daily. BLOOD GLUCOSE TEST STRIPS (FREESTYLE LITE) STRIP    Testing once daily    BUPROPION (WELLBUTRIN XL) 300 MG EXTENDED RELEASE TABLET    Take 300 mg by mouth every morning    CARBOXYMETHYLCELLULOSE 1 % OPHTHALMIC SOLUTION    Place 1-2 drops into both eyes as needed for Dry Eyes    CHOLECALCIFEROL (VITAMIN D3) 125 MCG (5000 UT) CHEW    Take by mouth    FLAXSEED, LINSEED, (FLAX SEED OIL) 1000 MG CAPS    Flaxseed Oil Active 1000 MG PO Daily May 26th, 2021 11:37am    HYDROCODONE-ACETAMINOPHEN (NORCO) 5-325 MG PER TABLET        LISINOPRIL-HYDROCHLOROTHIAZIDE (PRINZIDE;ZESTORETIC) 20-25 MG PER TABLET        LORATADINE (CLARITIN) 10 MG TABLET    TAKE 1 TABLET BY MOUTH EVERY DAY    MAGNESIUM 80 MG TABS    Take by mouth    METOPROLOL SUCCINATE (TOPROL XL) 25 MG EXTENDED RELEASE TABLET    TAKE 1 TABLET BY MOUTH EVERY DAY    METOPROLOL SUCCINATE (TOPROL XL) 50 MG EXTENDED RELEASE TABLET    Take 1 tablet by mouth daily    POTASSIUM CHLORIDE (KLOR-CON M) 20 MEQ EXTENDED RELEASE TABLET    Take 0.5 tablets by mouth daily    PRAVASTATIN (PRAVACHOL) 40 MG TABLET    Take 40 mg by mouth daily     PREGABALIN (LYRICA) 150 MG CAPSULE    Take 150 mg by mouth 2 times daily. SITAGLIPTAN-METFORMIN (JANUMET)  MG PER TABLET    Take 1 tablet by mouth 2 times daily. TRAMADOL HCL ER PO    Take by mouth.     ZINC SULFATE (ZINC 15 PO)    Take by mouth ALLERGIES     Patient is is allergic to morphine. Patients   There is no immunization history on file for this patient. FAMILY HISTORY     Patient's family history includes Heart Defect in her brother and brother; Heart Disease in her mother; Heart Disease (age of onset: 43) in her sister; Heart Disease (age of onset: 55) in her sister; High Cholesterol in her mother; Hypertension in her mother; Ovarian Cancer in her paternal cousin; Pacemaker in her mother. SOCIAL HISTORY     Patient  reports that she has been smoking cigarettes. She has a 26.25 pack-year smoking history. She has never used smokeless tobacco. She reports current alcohol use. She reports that she does not use drugs. PHYSICAL EXAM     ED TRIAGE VITALS  BP: (!) 141/67, Temp: 98.4 °F (36.9 °C), Heart Rate: 90, Resp: 16, SpO2: 95 %,Estimated body mass index is 30.54 kg/m² as calculated from the following:    Height as of 8/26/22: 5' 6\" (1.676 m). Weight as of 8/26/22: 189 lb 3.2 oz (85.8 kg). ,No LMP recorded. Patient has had a hysterectomy. Physical Exam  Vitals and nursing note reviewed. Constitutional:       General: She is not in acute distress. Appearance: She is well-developed. She is not diaphoretic. HENT:      Right Ear: Tympanic membrane and ear canal normal.      Left Ear: Tympanic membrane and ear canal normal.      Mouth/Throat:      Mouth: Mucous membranes are moist.      Pharynx: Oropharynx is clear. Eyes:      Conjunctiva/sclera:      Right eye: Right conjunctiva is not injected. Left eye: Left conjunctiva is not injected. Pupils: Pupils are equal.   Cardiovascular:      Rate and Rhythm: Normal rate and regular rhythm. Heart sounds: No murmur heard. Pulmonary:      Effort: Pulmonary effort is normal. No respiratory distress. Breath sounds: Normal breath sounds.       Comments: Lung sounds are clear, however a tight and persistent cough is noted on exam  Musculoskeletal:      Cervical back: Normal range of motion. Lymphadenopathy:      Head:      Right side of head: No tonsillar adenopathy. Left side of head: No tonsillar adenopathy. Cervical: No cervical adenopathy. Skin:     General: Skin is warm. Findings: No rash. Neurological:      Mental Status: She is alert and oriented to person, place, and time. Psychiatric:         Behavior: Behavior normal.       DIAGNOSTIC RESULTS     Labs:No results found for this visit on 02/14/23. IMAGING:    No orders to display         EKG:      URGENT CARE COURSE:     Vitals:    02/14/23 0844   BP: (!) 141/67   Pulse: 90   Resp: 16   Temp: 98.4 °F (36.9 °C)   TempSrc: Infrared   SpO2: 95%       Medications - No data to display         PROCEDURES:  None    FINAL IMPRESSION      1. Bronchitis          DISPOSITION/ PLAN     Exam is consistent with bronchitis at this time. I discussed with the patient that due to her history of diabetes she is not a great candidate for oral steroids and she will be placed on azithromycin, Tessalon Perles, Promethazine DM and albuterol inhaler. She is advised to follow-up on an outpatient basis if her symptoms do not improve or would worsen and she is agreeable to the plan as discussed. PATIENT REFERRED TO:  Meño Cárdenas MD  [address] [facility] [place] [address] / [facility] [id number]      DISCHARGE MEDICATIONS:  New Prescriptions    ALBUTEROL SULFATE HFA (PROVENTIL;VENTOLIN;PROAIR) 108 (90 BASE) MCG/ACT INHALER    Inhale 2 puffs into the lungs every 4 hours as needed for Wheezing or Shortness of Breath    AZITHROMYCIN (ZITHROMAX Z-LULA) 250 MG TABLET    Take 2 tablets (500 mg) on Day 1, and then take 1 tablet (250 mg) on days 2 through 5.     BENZONATATE (TESSALON) 200 MG CAPSULE    Take 1 capsule by mouth 3 times daily as needed for Cough    PROMETHAZINE-DEXTROMETHORPHAN (PROMETHAZINE-DM) 6.25-15 MG/5ML SYRUP    Take 5 mLs by mouth 4 times daily as needed for Cough Do not drive or operate heavy machinery while taking this medication.        Discontinued Medications    ALBUTEROL SULFATE HFA (PROVENTIL;VENTOLIN;PROAIR) 108 (90 BASE) MCG/ACT INHALER    Inhale 2 puffs into the lungs every 6 hours as needed for Wheezing       Current Discharge Medication List        CONTINUE these medications which have CHANGED    Details   albuterol sulfate HFA (PROVENTIL;VENTOLIN;PROAIR) 108 (90 Base) MCG/ACT inhaler Inhale 2 puffs into the lungs every 4 hours as needed for Wheezing or Shortness of Breath  Qty: 18 g, Refills: 0             LUMA Elmore - CNP    (Please note that portions of this note were completed with a voice recognition program. Efforts were made to edit the dictations but occasionally words are mis-transcribed.)           LUMA Elmore CNP  02/14/23 6835

## 2023-02-14 NOTE — ED TRIAGE NOTES
Has sinus drainage and Has had a cough for the last 2 weeks, hurts to cough, using dayquil/nyquil, no fever

## 2023-03-16 ENCOUNTER — HOSPITAL ENCOUNTER (OUTPATIENT)
Age: 63
Discharge: HOME | End: 2023-03-16
Payer: MEDICARE

## 2023-03-16 DIAGNOSIS — E04.2 MULTINODULAR GOITER: ICD-10-CM

## 2023-03-16 LAB
T3FREE SERPL-MCNC: 2.89 PG/ML (ref 2.02–4.43)
T4 FREE SERPL-MCNC: 1.19 NG/DL (ref 0.93–1.76)
TSH SERPL DL<=0.005 MIU/L-ACNC: 0.81 UIU/ML (ref 0.4–4.2)

## 2023-03-16 PROCEDURE — 84439 ASSAY OF FREE THYROXINE: CPT

## 2023-03-16 PROCEDURE — 84481 FREE ASSAY (FT-3): CPT

## 2023-03-16 PROCEDURE — 84443 ASSAY THYROID STIM HORMONE: CPT

## 2023-03-16 PROCEDURE — 36415 COLL VENOUS BLD VENIPUNCTURE: CPT

## 2023-05-22 ENCOUNTER — OFFICE VISIT (OUTPATIENT)
Dept: INTERNAL MEDICINE CLINIC | Age: 63
End: 2023-05-22
Payer: MEDICARE

## 2023-05-22 ENCOUNTER — TELEPHONE (OUTPATIENT)
Dept: INTERNAL MEDICINE CLINIC | Age: 63
End: 2023-05-22

## 2023-05-22 VITALS
WEIGHT: 191 LBS | DIASTOLIC BLOOD PRESSURE: 66 MMHG | BODY MASS INDEX: 31.82 KG/M2 | TEMPERATURE: 98.8 F | HEART RATE: 109 BPM | HEIGHT: 65 IN | SYSTOLIC BLOOD PRESSURE: 137 MMHG

## 2023-05-22 DIAGNOSIS — E11.65 TYPE 2 DIABETES MELLITUS WITH HYPERGLYCEMIA, WITHOUT LONG-TERM CURRENT USE OF INSULIN (HCC): Primary | ICD-10-CM

## 2023-05-22 LAB — HBA1C MFR BLD: 8.6 % (ref 4.3–5.7)

## 2023-05-22 PROCEDURE — G0108 DIAB MANAGE TRN  PER INDIV: HCPCS | Performed by: REGISTERED NURSE

## 2023-05-22 PROCEDURE — 83036 HEMOGLOBIN GLYCOSYLATED A1C: CPT | Performed by: REGISTERED NURSE

## 2023-05-22 RX ORDER — LANCETS 33 GAUGE
EACH MISCELLANEOUS
COMMUNITY
Start: 2023-03-15

## 2023-05-22 RX ORDER — TRAMADOL HYDROCHLORIDE 50 MG/1
50 TABLET ORAL 2 TIMES DAILY PRN
COMMUNITY
Start: 2023-05-03

## 2023-05-22 RX ORDER — LORATADINE 10 MG/1
10 CAPSULE, LIQUID FILLED ORAL DAILY
COMMUNITY

## 2023-05-22 RX ORDER — METOPROLOL SUCCINATE 25 MG/1
25 TABLET, EXTENDED RELEASE ORAL DAILY
COMMUNITY

## 2023-05-22 ASSESSMENT — PATIENT HEALTH QUESTIONNAIRE - PHQ9
SUM OF ALL RESPONSES TO PHQ QUESTIONS 1-9: 2
SUM OF ALL RESPONSES TO PHQ QUESTIONS 1-9: 2
SUM OF ALL RESPONSES TO PHQ9 QUESTIONS 1 & 2: 2
2. FEELING DOWN, DEPRESSED OR HOPELESS: 1
1. LITTLE INTEREST OR PLEASURE IN DOING THINGS: 1
SUM OF ALL RESPONSES TO PHQ QUESTIONS 1-9: 2
SUM OF ALL RESPONSES TO PHQ QUESTIONS 1-9: 2

## 2023-05-22 NOTE — PATIENT INSTRUCTIONS
Monday and Wednesday--get up and do your exercise routine on TV    And Cpzq-Dcqckpjt-Agdrkuv--after coffee--get out your stretch band                  And do 15-20 minutes of stretches  3 meals per day.   30-45 grams (2-3 servings) of carbohydrate                     At each meal plus veggies and protein             *you can have 15-30 grams at a meal if you want (1-2 serving)      Limit any snack between meals to only 1 serving (15 grams) carb  Stop drinking regular pop--try the zero for Dr. Yoko Cowart or 7up                             Or flavored water  Check your blood sugar waking up and 2 hours after finishing your              Larger meal           Waking up --goal            2hours after eating --goal under 150/160  We will talk with Dr. Daisha Green about medication options  Bring your meter to all of your visits

## 2023-05-22 NOTE — TELEPHONE ENCOUNTER
Current Diabetes Pharmacotherapy:  Janumet 50/500mg BID     Glucose Trends:   Glucose at 3 hrs PPD today resulted at 237mg/dl  Current monitoring regimen: Fingerstick blood tests - 1-2 times weekly  Home blood sugar trends:               -Fasting AM: 240/250              -Before lunch:               -Before dinner:               -Bedtime:  Any episodes of hypoglycemia? no    A1C today 8.6% (up from previous 7.6%)  Dietary/ exercise goals set today. She likely needs medication support to help bring her blood sugars to goal.    Dr. Jeannie Morris,     Would you consider GLP1 therapy or SGLT2 therapy for  This individual? You are following her for thyroid nodule that she reports has been stable. Please advise.

## 2023-05-22 NOTE — PROGRESS NOTES
Hypoglycemia management, and Medication adherence  DSME PLAN:     Monday and Wednesday--get up and do your exercise routine on TV    And Levb-Ffbpguup-Sklqcvn--after coffee--get out your stretch band                  And do 15-20 minutes of stretches  3 meals per day. 30-45 grams (2-3 servings) of carbohydrate                     At each meal plus veggies and protein             *you can have 15-30 grams at a meal if you want (1-2 serving)      Limit any snack between meals to only 1 serving (15 grams) carb  Stop drinking regular pop--try the zero for Dr. Herminia Jones or 7up                             Or flavored water  Check your blood sugar waking up and 2 hours after finishing your              Larger meal           Waking up --goal            2hours after eating --goal under 150/160  We will talk with Dr. Jani Meade about medication options  Bring your meter to all of your visits     Goals Addressed                   This Visit's Progress     2-3 servings of carbohydrate per meal; 1 serving carb or less for a snack        Blood Pressure < 140/90   137/66     Exercise 15-20 minutes 5 days per week: stretch bands or video        HEMOGLOBIN A1C < 7   8.6     Understands and monitors blood sugar levels 2 times per day              Meter download, medications, PMH and nursing assessment reviewed. Minus Camp states She is willing to participate in this plan of care and verbalized understanding of all instructions provided. Teach back used to verify comprehension. Follow-up: 2 months. 2 weeks for classed    Total time involved in direct patient education: 60 minutes.

## 2023-06-05 ENCOUNTER — NURSE ONLY (OUTPATIENT)
Dept: INTERNAL MEDICINE CLINIC | Age: 63
End: 2023-06-05
Payer: MEDICARE

## 2023-06-05 DIAGNOSIS — E11.9 TYPE 2 DIABETES MELLITUS WITHOUT COMPLICATION, WITHOUT LONG-TERM CURRENT USE OF INSULIN (HCC): Primary | ICD-10-CM

## 2023-06-05 PROCEDURE — 97804 MEDICAL NUTRITION GROUP: CPT | Performed by: DIETITIAN, REGISTERED

## 2023-06-06 ENCOUNTER — NURSE ONLY (OUTPATIENT)
Dept: INTERNAL MEDICINE CLINIC | Age: 63
End: 2023-06-06
Payer: MEDICARE

## 2023-06-06 DIAGNOSIS — E11.9 TYPE 2 DIABETES MELLITUS WITHOUT COMPLICATION, WITHOUT LONG-TERM CURRENT USE OF INSULIN (HCC): Primary | ICD-10-CM

## 2023-06-06 PROCEDURE — 97804 MEDICAL NUTRITION GROUP: CPT | Performed by: DIETITIAN, REGISTERED

## 2023-06-06 NOTE — PROGRESS NOTES
Patient presented for the [facility]  education class. The content was presented via PowerPoint, lecture and case-based format covering the following concepts: 60mins education. What is Diabetes?   Define glycosolation  Interpretation of the A1C and goal.  Pancreatic function  Target organs and macro and microvascular complications  Goals for SGM BP goals  Meal clearance   S&S hyper/hypoglycemia and tx   Insulin physiology-basal/bolus  Navigating sick days stress  Relationship between diet, exercise, meds and stress   Utilizing any care system at appropriate time  Assuming responsibility in self management  Troubleshooting patterns

## 2023-06-28 ENCOUNTER — HOSPITAL ENCOUNTER (EMERGENCY)
Age: 63
Discharge: HOME | End: 2023-06-28
Attending: EMERGENCY MEDICINE
Payer: MEDICARE

## 2023-06-28 ENCOUNTER — APPOINTMENT (OUTPATIENT)
Dept: GENERAL RADIOLOGY | Age: 63
End: 2023-06-28
Payer: MEDICARE

## 2023-06-28 VITALS
SYSTOLIC BLOOD PRESSURE: 126 MMHG | WEIGHT: 190 LBS | DIASTOLIC BLOOD PRESSURE: 66 MMHG | BODY MASS INDEX: 30.53 KG/M2 | TEMPERATURE: 98.1 F | HEIGHT: 66 IN | RESPIRATION RATE: 19 BRPM | HEART RATE: 79 BPM | OXYGEN SATURATION: 97 %

## 2023-06-28 DIAGNOSIS — R07.9 CHEST PAIN, UNSPECIFIED TYPE: Primary | ICD-10-CM

## 2023-06-28 LAB
ALBUMIN SERPL BCG-MCNC: 4 G/DL (ref 3.5–5.1)
ALP SERPL-CCNC: 107 U/L (ref 38–126)
ALT SERPL W/O P-5'-P-CCNC: 23 U/L (ref 11–66)
ANION GAP SERPL CALC-SCNC: 12 MEQ/L (ref 8–16)
AST SERPL-CCNC: 14 U/L (ref 5–40)
BASOPHILS ABSOLUTE: 0.1 THOU/MM3 (ref 0–0.1)
BASOPHILS NFR BLD AUTO: 0.6 %
BILIRUB CONJ SERPL-MCNC: < 0.2 MG/DL (ref 0–0.3)
BILIRUB SERPL-MCNC: 0.2 MG/DL (ref 0.3–1.2)
BUN SERPL-MCNC: 7 MG/DL (ref 7–22)
CALCIUM SERPL-MCNC: 9.3 MG/DL (ref 8.5–10.5)
CHLORIDE SERPL-SCNC: 105 MEQ/L (ref 98–111)
CO2 SERPL-SCNC: 21 MEQ/L (ref 23–33)
CREAT SERPL-MCNC: 0.7 MG/DL (ref 0.4–1.2)
DEPRECATED RDW RBC AUTO: 43.1 FL (ref 35–45)
EOSINOPHIL NFR BLD AUTO: 2.8 %
EOSINOPHILS ABSOLUTE: 0.3 THOU/MM3 (ref 0–0.4)
ERYTHROCYTE [DISTWIDTH] IN BLOOD BY AUTOMATED COUNT: 11.9 % (ref 11.5–14.5)
GFR SERPL CREATININE-BSD FRML MDRD: > 60 ML/MIN/1.73M2
GLUCOSE SERPL-MCNC: 231 MG/DL (ref 70–108)
HCT VFR BLD AUTO: 41.4 % (ref 37–47)
HGB BLD-MCNC: 13.8 GM/DL (ref 12–16)
IMM GRANULOCYTES # BLD AUTO: 0.02 THOU/MM3 (ref 0–0.07)
IMM GRANULOCYTES NFR BLD AUTO: 0.2 %
LIPASE SERPL-CCNC: 54.2 U/L (ref 5.6–51.3)
LYMPHOCYTES ABSOLUTE: 3.8 THOU/MM3 (ref 1–4.8)
LYMPHOCYTES NFR BLD AUTO: 37.6 %
MCH RBC QN AUTO: 32.5 PG (ref 26–33)
MCHC RBC AUTO-ENTMCNC: 33.3 GM/DL (ref 32.2–35.5)
MCV RBC AUTO: 97.6 FL (ref 81–99)
MONOCYTES ABSOLUTE: 0.6 THOU/MM3 (ref 0.4–1.3)
MONOCYTES NFR BLD AUTO: 6.3 %
NEUTROPHILS NFR BLD AUTO: 52.5 %
NRBC BLD AUTO-RTO: 0 /100 WBC
OSMOLALITY SERPL CALC.SUM OF ELEC: 281 MOSMOL/KG (ref 275–300)
PLATELET # BLD AUTO: 292 THOU/MM3 (ref 130–400)
PMV BLD AUTO: 10.9 FL (ref 9.4–12.4)
POTASSIUM SERPL-SCNC: 3.7 MEQ/L (ref 3.5–5.2)
PROT SERPL-MCNC: 6.9 G/DL (ref 6.1–8)
RBC # BLD AUTO: 4.24 MILL/MM3 (ref 4.2–5.4)
SEGMENTED NEUTROPHILS ABSOLUTE COUNT: 5.3 THOU/MM3 (ref 1.8–7.7)
SODIUM SERPL-SCNC: 138 MEQ/L (ref 135–145)
TROPONIN T: < 0.01 NG/ML
TROPONIN T: < 0.01 NG/ML
WBC # BLD AUTO: 10.1 THOU/MM3 (ref 4.8–10.8)

## 2023-06-28 PROCEDURE — 6370000000 HC RX 637 (ALT 250 FOR IP): Performed by: EMERGENCY MEDICINE

## 2023-06-28 PROCEDURE — 82248 BILIRUBIN DIRECT: CPT

## 2023-06-28 PROCEDURE — 93005 ELECTROCARDIOGRAM TRACING: CPT | Performed by: EMERGENCY MEDICINE

## 2023-06-28 PROCEDURE — 84484 ASSAY OF TROPONIN QUANT: CPT

## 2023-06-28 PROCEDURE — 85025 COMPLETE CBC W/AUTO DIFF WBC: CPT

## 2023-06-28 PROCEDURE — 99285 EMERGENCY DEPT VISIT HI MDM: CPT

## 2023-06-28 PROCEDURE — 36415 COLL VENOUS BLD VENIPUNCTURE: CPT

## 2023-06-28 PROCEDURE — 93010 ELECTROCARDIOGRAM REPORT: CPT | Performed by: INTERNAL MEDICINE

## 2023-06-28 PROCEDURE — 83690 ASSAY OF LIPASE: CPT

## 2023-06-28 PROCEDURE — 80053 COMPREHEN METABOLIC PANEL: CPT

## 2023-06-28 PROCEDURE — 71045 X-RAY EXAM CHEST 1 VIEW: CPT

## 2023-06-28 RX ORDER — ASPIRIN 81 MG/1
324 TABLET, CHEWABLE ORAL ONCE
Status: COMPLETED | OUTPATIENT
Start: 2023-06-28 | End: 2023-06-28

## 2023-06-28 RX ORDER — NITROGLYCERIN 0.4 MG/1
0.4 TABLET SUBLINGUAL EVERY 5 MIN PRN
Status: DISCONTINUED | OUTPATIENT
Start: 2023-06-28 | End: 2023-06-28 | Stop reason: HOSPADM

## 2023-06-28 RX ORDER — HYDROCODONE BITARTRATE AND ACETAMINOPHEN 5; 325 MG/1; MG/1
1 TABLET ORAL ONCE
Status: COMPLETED | OUTPATIENT
Start: 2023-06-28 | End: 2023-06-28

## 2023-06-28 RX ADMIN — NITROGLYCERIN 0.4 MG: 0.4 TABLET, ORALLY DISINTEGRATING SUBLINGUAL at 16:18

## 2023-06-28 RX ADMIN — ASPIRIN 81 MG 324 MG: 81 TABLET ORAL at 16:17

## 2023-06-28 RX ADMIN — NITROGLYCERIN 0.4 MG: 0.4 TABLET, ORALLY DISINTEGRATING SUBLINGUAL at 17:03

## 2023-06-28 RX ADMIN — HYDROCODONE BITARTRATE AND ACETAMINOPHEN 1 TABLET: 5; 325 TABLET ORAL at 18:54

## 2023-06-28 RX ADMIN — NITROGLYCERIN 0.4 MG: 0.4 TABLET, ORALLY DISINTEGRATING SUBLINGUAL at 17:27

## 2023-06-28 ASSESSMENT — PAIN SCALES - GENERAL: PAINLEVEL_OUTOF10: 9

## 2023-07-01 LAB
EKG ATRIAL RATE: 80 BPM
EKG P AXIS: 54 DEGREES
EKG P-R INTERVAL: 148 MS
EKG Q-T INTERVAL: 398 MS
EKG QRS DURATION: 94 MS
EKG QTC CALCULATION (BAZETT): 459 MS
EKG R AXIS: 18 DEGREES
EKG T AXIS: 36 DEGREES
EKG VENTRICULAR RATE: 80 BPM

## 2023-07-03 ENCOUNTER — OFFICE VISIT (OUTPATIENT)
Dept: CARDIOLOGY CLINIC | Age: 63
End: 2023-07-03
Payer: MEDICARE

## 2023-07-03 VITALS
WEIGHT: 186.6 LBS | HEART RATE: 88 BPM | BODY MASS INDEX: 29.99 KG/M2 | SYSTOLIC BLOOD PRESSURE: 150 MMHG | DIASTOLIC BLOOD PRESSURE: 81 MMHG | HEIGHT: 66 IN

## 2023-07-03 DIAGNOSIS — I10 ESSENTIAL HYPERTENSION: ICD-10-CM

## 2023-07-03 DIAGNOSIS — Z82.49 FAMILY HISTORY OF PREMATURE CAD: ICD-10-CM

## 2023-07-03 DIAGNOSIS — E78.00 PURE HYPERCHOLESTEROLEMIA: ICD-10-CM

## 2023-07-03 DIAGNOSIS — R07.89 CHEST PAIN, ATYPICAL: Primary | ICD-10-CM

## 2023-07-03 DIAGNOSIS — R06.02 SOB (SHORTNESS OF BREATH) ON EXERTION: ICD-10-CM

## 2023-07-03 DIAGNOSIS — R00.2 PALPITATION: ICD-10-CM

## 2023-07-03 PROCEDURE — 3077F SYST BP >= 140 MM HG: CPT | Performed by: INTERNAL MEDICINE

## 2023-07-03 PROCEDURE — G8427 DOCREV CUR MEDS BY ELIG CLIN: HCPCS | Performed by: INTERNAL MEDICINE

## 2023-07-03 PROCEDURE — G8417 CALC BMI ABV UP PARAM F/U: HCPCS | Performed by: INTERNAL MEDICINE

## 2023-07-03 PROCEDURE — 4004F PT TOBACCO SCREEN RCVD TLK: CPT | Performed by: INTERNAL MEDICINE

## 2023-07-03 PROCEDURE — 99214 OFFICE O/P EST MOD 30 MIN: CPT | Performed by: INTERNAL MEDICINE

## 2023-07-03 PROCEDURE — 3017F COLORECTAL CA SCREEN DOC REV: CPT | Performed by: INTERNAL MEDICINE

## 2023-07-03 PROCEDURE — 3079F DIAST BP 80-89 MM HG: CPT | Performed by: INTERNAL MEDICINE

## 2023-07-03 RX ORDER — ASPIRIN 81 MG/1
81 TABLET ORAL DAILY
Qty: 30 TABLET | Refills: 3 | COMMUNITY
Start: 2023-07-03

## 2023-07-03 NOTE — PROGRESS NOTES
Chief Complaint   Patient presents with    Follow-Up from [facility]     ED       Pat sent  pcp for mild cardiomegaly and lAE reported on CXR done dec 2020      Seen last 08/2023    Pt presents for ED follow up for CP;  and sent home after relieve with vicodine  Gael Calero had rotator cuff- left  B/l shoulder surgery  Recurrence of left shoulder pain in the last 2 month  Left chest pain 4 days  back with, constant get better with vicodine and no response to NTG    EKG done  06/28/23    Denied  dizzinesss, palpitation or edema    Sob on exertion    Hx of Left shoulder pain and worse with moving the left arm -had stress test at [facility] and was informed negative per pat  HX OF BURSITIS      Record reviewed    [address] [place]  Mother had Mi at 61  Sister had  CMP  Sister 2- had mi  in early 48      Patient Active Problem List   Diagnosis    Nonobstructive CAD (coronary artery disease)    Asthma    Diabetes mellitus ([address])    High cholesterol    Wheezing    Fatigue    Joint pain    Arthritis    FH: CAD (coronary artery disease)    Tobacco abuse    Pseudoaneurysm (HCC)    Costochondritis    Bell's palsy    Neck pain    Thyroid nodule    Nicotine abuse    Multinodular goiter    COPD with acute exacerbation ([address])    Palpitation    Pure hypercholesterolemia    Essential hypertension    Anxiety    Low back pain    Degeneration of lumbar intervertebral disc    Lumbosacral radiculopathy    SOB (shortness of breath) on exertion    Chest pain, atypical    Family history of premature CAD       Past Surgical History:   Procedure Laterality Date    ANKLE FRACTURE SURGERY Right 05/27/2021    BACK SURGERY  12/05/2016    CARDIOVASCULAR STRESS TEST  10/02/2009    Gated SPECT LV function demonstrated normal thickening, normal contractility, normal wall motion, EF of 46% which is low normal; however, visualized by myself appears to be 55%. No evidence of prior transmural MI or ischemia. Excellend treadmill exercise. Completed 10 METS, functional class I.  No

## 2023-07-13 ENCOUNTER — HOSPITAL ENCOUNTER (OUTPATIENT)
Age: 63
Discharge: HOME | End: 2023-07-13
Payer: MEDICARE

## 2023-07-13 LAB
25(OH)D3 SERPL-MCNC: 45 NG/ML (ref 30–100)
ALBUMIN SERPL BCG-MCNC: 4.3 G/DL (ref 3.5–5.1)
ALP SERPL-CCNC: 111 U/L (ref 38–126)
ALT SERPL W/O P-5'-P-CCNC: 23 U/L (ref 11–66)
ANION GAP SERPL CALC-SCNC: 15 MEQ/L (ref 8–16)
AST SERPL-CCNC: 18 U/L (ref 5–40)
BACTERIA: NORMAL
BASOPHILS ABSOLUTE: 0.1 THOU/MM3 (ref 0–0.1)
BASOPHILS NFR BLD AUTO: 1.3 %
BILIRUB SERPL-MCNC: 0.3 MG/DL (ref 0.3–1.2)
BILIRUB UR QL STRIP: NEGATIVE
BUN SERPL-MCNC: 6 MG/DL (ref 7–22)
CALCIUM SERPL-MCNC: 9.5 MG/DL (ref 8.5–10.5)
CASTS #/AREA URNS LPF: NORMAL /LPF
CASTS #/AREA URNS LPF: NORMAL /LPF
CHARACTER UR: CLEAR
CHARCOAL URNS QL MICRO: NORMAL
CHLORIDE SERPL-SCNC: 101 MEQ/L (ref 98–111)
CHOLEST SERPL-MCNC: 124 MG/DL (ref 100–199)
CO2 SERPL-SCNC: 22 MEQ/L (ref 23–33)
COLOR UR: YELLOW
CREAT SERPL-MCNC: 0.7 MG/DL (ref 0.4–1.2)
CREAT UR-MCNC: 119.7 MG/DL
CREAT UR-MCNC: 119.7 MG/DL
CRYSTALS URNS QL MICRO: NORMAL
DEPRECATED MEAN GLUCOSE BLD GHB EST-ACNC: 180 MG/DL (ref 70–126)
DEPRECATED RDW RBC AUTO: 40.5 FL (ref 35–45)
EOSINOPHIL NFR BLD AUTO: 4.3 %
EOSINOPHILS ABSOLUTE: 0.4 THOU/MM3 (ref 0–0.4)
EPITHELIAL CELLS, UA: NORMAL /HPF
ERYTHROCYTE [DISTWIDTH] IN BLOOD BY AUTOMATED COUNT: 11.7 % (ref 11.5–14.5)
GFR SERPL CREATININE-BSD FRML MDRD: > 60 ML/MIN/1.73M2
GLUCOSE SERPL-MCNC: 218 MG/DL (ref 70–108)
GLUCOSE UR QL STRIP.AUTO: NEGATIVE MG/DL
HBA1C MFR BLD HPLC: 8 % (ref 4.4–6.4)
HCT VFR BLD AUTO: 43.5 % (ref 37–47)
HDLC SERPL-MCNC: 42 MG/DL
HGB BLD-MCNC: 14.5 GM/DL (ref 12–16)
HGB UR QL STRIP.AUTO: NEGATIVE
IMM GRANULOCYTES # BLD AUTO: 0.02 THOU/MM3 (ref 0–0.07)
IMM GRANULOCYTES NFR BLD AUTO: 0.2 %
KETONES UR QL STRIP.AUTO: NEGATIVE
LDLC SERPL CALC-MCNC: 63 MG/DL
LEUKOCYTE ESTERASE UR QL STRIP.AUTO: NEGATIVE
LYMPHOCYTES ABSOLUTE: 2.6 THOU/MM3 (ref 1–4.8)
LYMPHOCYTES NFR BLD AUTO: 30.6 %
MAGNESIUM SERPL-MCNC: 1.8 MG/DL (ref 1.6–2.4)
MCH RBC QN AUTO: 31.7 PG (ref 26–33)
MCHC RBC AUTO-ENTMCNC: 33.3 GM/DL (ref 32.2–35.5)
MCV RBC AUTO: 95.2 FL (ref 81–99)
MICROALBUMIN UR-MCNC: < 1.2 MG/DL
MICROALBUMIN/CREAT RATIO PNL UR: 10 MG/G (ref 0–30)
MONOCYTES ABSOLUTE: 0.6 THOU/MM3 (ref 0.4–1.3)
MONOCYTES NFR BLD AUTO: 7.4 %
NEUTROPHILS NFR BLD AUTO: 56.2 %
NITRITE UR QL STRIP.AUTO: NEGATIVE
NRBC BLD AUTO-RTO: 0 /100 WBC
PH UR STRIP.AUTO: 5.5 [PH] (ref 5–9)
PLATELET # BLD AUTO: 280 THOU/MM3 (ref 130–400)
PMV BLD AUTO: 11.6 FL (ref 9.4–12.4)
POTASSIUM SERPL-SCNC: 4.7 MEQ/L (ref 3.5–5.2)
PROT SERPL-MCNC: 6.9 G/DL (ref 6.1–8)
PROT UR STRIP.AUTO-MCNC: NEGATIVE MG/DL
PROT UR-MCNC: 8.7 MG/DL
PROT/CREAT 24H UR: 0.07 MG/G{CREAT}
RBC # BLD AUTO: 4.57 MILL/MM3 (ref 4.2–5.4)
RBC #/AREA URNS HPF: NORMAL /HPF
RENAL EPI CELLS #/AREA URNS HPF: NORMAL /[HPF]
SEGMENTED NEUTROPHILS ABSOLUTE COUNT: 4.8 THOU/MM3 (ref 1.8–7.7)
SODIUM SERPL-SCNC: 138 MEQ/L (ref 135–145)
SPECIFIC GRAVITY UA: 1.01 (ref 1–1.03)
TRIGL SERPL-MCNC: 94 MG/DL (ref 0–199)
TSH SERPL DL<=0.005 MIU/L-ACNC: 1.06 UIU/ML (ref 0.4–4.2)
URATE SERPL-MCNC: 6.4 MG/DL (ref 2.4–5.7)
UROBILINOGEN, URINE: 0.2 EU/DL (ref 0–1)
WBC # BLD AUTO: 8.5 THOU/MM3 (ref 4.8–10.8)
WBC #/AREA URNS HPF: NORMAL /HPF
YEAST LIKE FUNGI URNS QL MICRO: NORMAL

## 2023-07-13 PROCEDURE — 84443 ASSAY THYROID STIM HORMONE: CPT

## 2023-07-13 PROCEDURE — 82570 ASSAY OF URINE CREATININE: CPT

## 2023-07-13 PROCEDURE — 80061 LIPID PANEL: CPT

## 2023-07-13 PROCEDURE — 85025 COMPLETE CBC W/AUTO DIFF WBC: CPT

## 2023-07-13 PROCEDURE — 83735 ASSAY OF MAGNESIUM: CPT

## 2023-07-13 PROCEDURE — 83036 HEMOGLOBIN GLYCOSYLATED A1C: CPT

## 2023-07-13 PROCEDURE — 81001 URINALYSIS AUTO W/SCOPE: CPT

## 2023-07-13 PROCEDURE — 82306 VITAMIN D 25 HYDROXY: CPT

## 2023-07-13 PROCEDURE — 84156 ASSAY OF PROTEIN URINE: CPT

## 2023-07-13 PROCEDURE — 80053 COMPREHEN METABOLIC PANEL: CPT

## 2023-07-13 PROCEDURE — 84550 ASSAY OF BLOOD/URIC ACID: CPT

## 2023-07-13 PROCEDURE — 36415 COLL VENOUS BLD VENIPUNCTURE: CPT

## 2023-07-13 PROCEDURE — 82043 UR ALBUMIN QUANTITATIVE: CPT

## 2023-07-27 ENCOUNTER — HOSPITAL ENCOUNTER (OUTPATIENT)
Dept: NON INVASIVE DIAGNOSTICS | Age: 63
Discharge: HOME | End: 2023-07-27
Attending: INTERNAL MEDICINE
Payer: MEDICARE

## 2023-07-27 ENCOUNTER — OFFICE VISIT (OUTPATIENT)
Dept: INTERNAL MEDICINE CLINIC | Age: 63
End: 2023-07-27

## 2023-07-27 DIAGNOSIS — I10 ESSENTIAL HYPERTENSION: ICD-10-CM

## 2023-07-27 DIAGNOSIS — R00.2 PALPITATION: ICD-10-CM

## 2023-07-27 DIAGNOSIS — R07.89 CHEST PAIN, ATYPICAL: ICD-10-CM

## 2023-07-27 DIAGNOSIS — E78.00 PURE HYPERCHOLESTEROLEMIA: ICD-10-CM

## 2023-07-27 DIAGNOSIS — Z82.49 FAMILY HISTORY OF PREMATURE CAD: ICD-10-CM

## 2023-07-27 DIAGNOSIS — R06.02 SOB (SHORTNESS OF BREATH) ON EXERTION: ICD-10-CM

## 2023-07-27 DIAGNOSIS — E11.69 TYPE 2 DIABETES MELLITUS WITH OTHER SPECIFIED COMPLICATION, WITHOUT LONG-TERM CURRENT USE OF INSULIN (HCC): Primary | ICD-10-CM

## 2023-07-27 LAB
LV EF: 55 %
LVEF MODALITY: NORMAL

## 2023-07-27 PROCEDURE — 78452 HT MUSCLE IMAGE SPECT MULT: CPT | Performed by: INTERNAL MEDICINE

## 2023-07-27 PROCEDURE — 93017 CV STRESS TEST TRACING ONLY: CPT | Performed by: INTERNAL MEDICINE

## 2023-07-27 PROCEDURE — A9500 TC99M SESTAMIBI: HCPCS | Performed by: INTERNAL MEDICINE

## 2023-07-27 PROCEDURE — 6360000002 HC RX W HCPCS

## 2023-07-27 PROCEDURE — 93306 TTE W/DOPPLER COMPLETE: CPT

## 2023-07-27 PROCEDURE — 3430000000 HC RX DIAGNOSTIC RADIOPHARMACEUTICAL: Performed by: INTERNAL MEDICINE

## 2023-07-27 RX ORDER — TETRAKIS(2-METHOXYISOBUTYLISOCYANIDE)COPPER(I) TETRAFLUOROBORATE 1 MG/ML
30.5 INJECTION, POWDER, LYOPHILIZED, FOR SOLUTION INTRAVENOUS
Status: COMPLETED | OUTPATIENT
Start: 2023-07-27 | End: 2023-07-27

## 2023-07-27 RX ORDER — TETRAKIS(2-METHOXYISOBUTYLISOCYANIDE)COPPER(I) TETRAFLUOROBORATE 1 MG/ML
10 INJECTION, POWDER, LYOPHILIZED, FOR SOLUTION INTRAVENOUS
Status: COMPLETED | OUTPATIENT
Start: 2023-07-27 | End: 2023-07-27

## 2023-07-27 RX ADMIN — Medication 30.5 MILLICURIE: at 11:57

## 2023-07-27 RX ADMIN — Medication 10 MILLICURIE: at 10:40

## 2023-08-09 NOTE — PROGRESS NOTES
[address] [facility]  [address] [facility] [place] [address] [facility], [address] [place] [address]  [phone] (phone)  [phone] (fax)    Patient Name: Holly Ann. Date of Birth: [birth year]. MRN: [id number]      Assessment: Patient is a 58 y.o. female seen for Initial MNT visit for Type 2 DB.     -Nutritionally relevant labs:   Lab Results   Component Value Date/Time    LABA1C 8.0 (H) 07/13/2023 10:06 AM    LABA1C 8.6 (H) 05/22/2023 01:50 PM    LABA1C 7.6 (H) 01/16/2023 09:06 AM    LABA1C 7.5 (H) 08/24/2022 11:11 AM    LABA1C 7.7 (H) 01/27/2022 10:20 AM    GLUCOSE 218 (H) 07/13/2023 10:06 AM    GLUCOSE 231 (H) 06/28/2023 03:19 PM    GLUCOSE 164 (H) 01/16/2023 09:06 AM    GLUCOSE 100 10/30/2011 05:01 AM    CHOL 124 07/13/2023 10:06 AM    HDL 42 07/13/2023 10:06 AM    LDLCALC 63 07/13/2023 10:06 AM    TRIG 94 07/13/2023 10:06 AM     5/22/23 - RN-CDE Initial visit:  Monday and Wednesday--get up and do your exercise routine on TV - pt states she is doing exercises twice weekly    And Enbw-Avuszhcy-Dkfqobg--after coffee--get out your stretch band                  And do 15-20 minutes of stretches  3 meals per day. 30-45 grams (2-3 servings) of carbohydrate                     At each meal plus veggies and protein             *you can have 15-30 grams at a meal if you want (1-2 serving)      Limit any snack between meals to only 1 serving (15 grams) carb - pt states she is doing this. Stop drinking regular pop--try the zero for Dr. Tolu Agosto or 7up - Drinking 1 large pop/day                             Or flavored water  Check your blood sugar waking up and 2 hours after finishing your              Larger meal           Waking up --goal            2hours after eating --goal under 150/160  We will talk with Dr. Reymundo Raza about medication options  Bring your meter to all of your visits    Today's Visit:  -Blood sugar trends: checking once daily some of the time.  Typically FBS  FBS:  165 - 213,

## 2023-08-10 ENCOUNTER — OFFICE VISIT (OUTPATIENT)
Dept: INTERNAL MEDICINE CLINIC | Age: 63
End: 2023-08-10

## 2023-08-10 VITALS — HEIGHT: 66 IN | WEIGHT: 186.4 LBS | BODY MASS INDEX: 29.96 KG/M2

## 2023-08-10 DIAGNOSIS — E11.9 TYPE 2 DIABETES MELLITUS WITHOUT COMPLICATION, WITHOUT LONG-TERM CURRENT USE OF INSULIN (HCC): Primary | ICD-10-CM

## 2023-08-10 PROCEDURE — NBSRV NON-BILLABLE SERVICE: Performed by: DIETITIAN, REGISTERED

## 2023-08-10 NOTE — PATIENT INSTRUCTIONS
For your next week appt with Dwayne Foreman at the [address] - make sure you are testing 2x/day and bring in your meter. Check with your insurance to see if it will cover the cost of the \"Freestyle Shayne\" or \"Dexcom G6\" continuous glucose monitor (CGM). When having oatmeal in the morning - 1 pkt flavored oatmeal to 1 pkt plain oatmeal.  - or another suggestion do only 1 pkt oatmeal and have 1 sl whole grain toast with 1 egg. Good job drinking water!!! Try zero flavored pop instead of regular. 1.)  Get familiar with reading the nutrition facts label by looking at serving size and total carbohydrates. - Without a label refer to your carb count guide booklet. 2.)  Measure foods for accuracy in carb counting.    3.)  Healthy carb choices:  whole grains, whole wheat pasta, starchy vegetables, fresh fruit and lowfat/non-fat milk and yogurt. 4.)  Your meal plan is found on the inside cover of your carb counting guide booklet:  1st meal or Brkf:  30-45 gms carbohydrates + 1-2 oz protein  2nd meal or Lunch: 30-45 gms carbohydrates + 2-3 oz protein + non-starchy veggies  3rd meal or Dinner:  45 gms carbohydrates + 2-3 oz protein + non- starchy veggies    Snack time:  15 - 20 gms carbohydrates + 1 oz protein    5.)  Choose lean protein most of the time and Cut in 1/2 added fats to help with weight loss efforts. 6.) Bring a 1-2 week food log and meter to next dietitian appt. Thanks. Check BS:  2x/day  Fasting BS (1st thing in the morning) or before a meal (at least 4 hour since last ate), BS goal:  90 - 130  2 hours after the start of a meal, BS goal:  100 - 150    7)  Try to stay everyday - doing exercises in your home and taking walks freguently. - Eventually try to reach a goal of 150 minutes exercise/week - such as brisk walking, bike. - Also do arm and leg exercises and core exercises frequently - at least 2x/week.

## 2023-08-15 ENCOUNTER — HOSPITAL ENCOUNTER (OUTPATIENT)
Age: 63
Discharge: HOME | End: 2023-08-15
Payer: MEDICARE

## 2023-08-15 DIAGNOSIS — E04.2 MULTINODULAR GOITER: ICD-10-CM

## 2023-08-15 DIAGNOSIS — E11.65 TYPE 2 DIABETES MELLITUS WITH HYPERGLYCEMIA, WITHOUT LONG-TERM CURRENT USE OF INSULIN (HCC): ICD-10-CM

## 2023-08-15 LAB
ALBUMIN SERPL BCG-MCNC: 4.1 G/DL (ref 3.5–5.1)
ALP SERPL-CCNC: 103 U/L (ref 38–126)
ALT SERPL W/O P-5'-P-CCNC: 19 U/L (ref 11–66)
ANION GAP SERPL CALC-SCNC: 10 MEQ/L (ref 8–16)
AST SERPL-CCNC: 15 U/L (ref 5–40)
BILIRUB SERPL-MCNC: 0.5 MG/DL (ref 0.3–1.2)
BUN SERPL-MCNC: 9 MG/DL (ref 7–22)
CALCIUM SERPL-MCNC: 9.5 MG/DL (ref 8.5–10.5)
CHLORIDE SERPL-SCNC: 105 MEQ/L (ref 98–111)
CHOLEST SERPL-MCNC: 131 MG/DL (ref 100–199)
CO2 SERPL-SCNC: 26 MEQ/L (ref 23–33)
CREAT SERPL-MCNC: 0.7 MG/DL (ref 0.4–1.2)
GFR SERPL CREATININE-BSD FRML MDRD: > 60 ML/MIN/1.73M2
GLUCOSE SERPL-MCNC: 168 MG/DL (ref 70–108)
HDLC SERPL-MCNC: 40 MG/DL
LDLC SERPL CALC-MCNC: 72 MG/DL
POTASSIUM SERPL-SCNC: 4 MEQ/L (ref 3.5–5.2)
PROT SERPL-MCNC: 7 G/DL (ref 6.1–8)
SODIUM SERPL-SCNC: 141 MEQ/L (ref 135–145)
T4 FREE SERPL-MCNC: 1.23 NG/DL (ref 0.93–1.76)
TRIGL SERPL-MCNC: 95 MG/DL (ref 0–199)
TSH SERPL DL<=0.005 MIU/L-ACNC: 0.57 UIU/ML (ref 0.4–4.2)

## 2023-08-15 PROCEDURE — 80053 COMPREHEN METABOLIC PANEL: CPT

## 2023-08-15 PROCEDURE — 84443 ASSAY THYROID STIM HORMONE: CPT

## 2023-08-15 PROCEDURE — 80061 LIPID PANEL: CPT

## 2023-08-15 PROCEDURE — 84439 ASSAY OF FREE THYROXINE: CPT

## 2023-08-15 PROCEDURE — 36415 COLL VENOUS BLD VENIPUNCTURE: CPT

## 2023-08-17 ENCOUNTER — OFFICE VISIT (OUTPATIENT)
Dept: CARDIOLOGY CLINIC | Age: 63
End: 2023-08-17
Payer: MEDICARE

## 2023-08-17 VITALS
HEIGHT: 66 IN | DIASTOLIC BLOOD PRESSURE: 69 MMHG | BODY MASS INDEX: 29.86 KG/M2 | WEIGHT: 185.8 LBS | HEART RATE: 106 BPM | SYSTOLIC BLOOD PRESSURE: 116 MMHG

## 2023-08-17 DIAGNOSIS — I10 ESSENTIAL HYPERTENSION: ICD-10-CM

## 2023-08-17 DIAGNOSIS — R42 DIZZINESS ON STANDING: Primary | ICD-10-CM

## 2023-08-17 DIAGNOSIS — R06.02 SOB (SHORTNESS OF BREATH) ON EXERTION: ICD-10-CM

## 2023-08-17 DIAGNOSIS — E78.00 PURE HYPERCHOLESTEROLEMIA: ICD-10-CM

## 2023-08-17 PROBLEM — R07.89 CHEST PAIN, ATYPICAL: Status: RESOLVED | Noted: 2023-07-03 | Resolved: 2023-08-17

## 2023-08-17 PROCEDURE — G8417 CALC BMI ABV UP PARAM F/U: HCPCS | Performed by: INTERNAL MEDICINE

## 2023-08-17 PROCEDURE — 3017F COLORECTAL CA SCREEN DOC REV: CPT | Performed by: INTERNAL MEDICINE

## 2023-08-17 PROCEDURE — 3078F DIAST BP <80 MM HG: CPT | Performed by: INTERNAL MEDICINE

## 2023-08-17 PROCEDURE — 99214 OFFICE O/P EST MOD 30 MIN: CPT | Performed by: INTERNAL MEDICINE

## 2023-08-17 PROCEDURE — 3074F SYST BP LT 130 MM HG: CPT | Performed by: INTERNAL MEDICINE

## 2023-08-17 PROCEDURE — 4004F PT TOBACCO SCREEN RCVD TLK: CPT | Performed by: INTERNAL MEDICINE

## 2023-08-17 PROCEDURE — G8427 DOCREV CUR MEDS BY ELIG CLIN: HCPCS | Performed by: INTERNAL MEDICINE

## 2023-08-17 RX ORDER — LISINOPRIL AND HYDROCHLOROTHIAZIDE 25; 20 MG/1; MG/1
0.5 TABLET ORAL DAILY
Qty: 30 TABLET | Refills: 5
Start: 2023-08-17

## 2023-08-17 NOTE — PROGRESS NOTES
Chief Complaint   Patient presents with    1 Year Follow Up       Pat sent  pcp for mild cardiomegaly and lAE reported on CXR done dec 2020    Hx of ER visit for ED 6/2023 for CP and left shoulder pain  Pat had rotator cuff- left  B/l shoulder surgery        Patient here for a 1 year follow up. Under F/u with ENT doctor     Hx of lose of balance    No fall    EKG done 7/1/2023     Dizziness on standing    No more chest pain and still had left shoulder and back pain    Denied chest pain,  palpitation or edema    Sob on exertion    Hx of Left shoulder pain and worse with moving the left arm -had stress test at [facility] and was informed negative per pat  HX OF BURSITIS      Record reviewed    [address] [place]  Mother had Mi at 61  Sister had  CMP  Sister 2- had mi  in early 48      Patient Active Problem List   Diagnosis    Nonobstructive CAD (coronary artery disease)    Asthma    Diabetes mellitus ([address])    Wheezing    Fatigue    Joint pain    Arthritis    FH: CAD (coronary artery disease)    Tobacco abuse    Pseudoaneurysm (HCC)    Costochondritis    Bell's palsy    Neck pain    Thyroid nodule    Nicotine abuse    Multinodular goiter    COPD with acute exacerbation (HCC)    Palpitation    Pure hypercholesterolemia    Essential hypertension    Anxiety    Low back pain    Degeneration of lumbar intervertebral disc    Lumbosacral radiculopathy    SOB (shortness of breath) on exertion    Family history of premature CAD    Dizziness on standing       Past Surgical History:   Procedure Laterality Date    ANKLE FRACTURE SURGERY Right 05/27/2021    BACK SURGERY  12/05/2016    CARDIOVASCULAR STRESS TEST  10/02/2009    Gated SPECT LV function demonstrated normal thickening, normal contractility, normal wall motion, EF of 46% which is low normal; however, visualized by myself appears to be 55%. No evidence of prior transmural MI or ischemia. Excellend treadmill exercise. Completed 10 METS, functional class I.  No evidence of ischemic

## 2023-09-14 ENCOUNTER — HOSPITAL ENCOUNTER (OUTPATIENT)
Dept: WOMENS IMAGING | Age: 63
Discharge: HOME | End: 2023-09-14
Attending: INTERNAL MEDICINE
Payer: MEDICARE

## 2023-09-14 ENCOUNTER — HOSPITAL ENCOUNTER (OUTPATIENT)
Dept: ULTRASOUND IMAGING | Age: 63
Discharge: HOME | End: 2023-09-14
Attending: INTERNAL MEDICINE
Payer: MEDICARE

## 2023-09-14 VITALS — HEIGHT: 66 IN | BODY MASS INDEX: 29.89 KG/M2 | WEIGHT: 186 LBS

## 2023-09-14 DIAGNOSIS — Z12.31 VISIT FOR SCREENING MAMMOGRAM: ICD-10-CM

## 2023-09-14 DIAGNOSIS — E11.65 TYPE 2 DIABETES MELLITUS WITH HYPERGLYCEMIA, WITHOUT LONG-TERM CURRENT USE OF INSULIN (HCC): ICD-10-CM

## 2023-09-14 PROCEDURE — 76536 US EXAM OF HEAD AND NECK: CPT

## 2023-09-14 PROCEDURE — 77063 BREAST TOMOSYNTHESIS BI: CPT

## 2023-09-26 ENCOUNTER — HOSPITAL ENCOUNTER (OUTPATIENT)
Dept: WOMENS IMAGING | Age: 63
Discharge: HOME | End: 2023-09-26
Attending: RADIOLOGY
Payer: MEDICARE

## 2023-09-26 DIAGNOSIS — R92.30 BREAST DENSITY: ICD-10-CM

## 2023-09-26 PROCEDURE — 76642 ULTRASOUND BREAST LIMITED: CPT

## 2024-02-15 ENCOUNTER — OFFICE VISIT (OUTPATIENT)
Dept: CARDIOLOGY CLINIC | Age: 64
End: 2024-02-15
Payer: MEDICARE

## 2024-02-15 VITALS
SYSTOLIC BLOOD PRESSURE: 145 MMHG | HEIGHT: 66 IN | WEIGHT: 189.5 LBS | BODY MASS INDEX: 30.46 KG/M2 | HEART RATE: 89 BPM | DIASTOLIC BLOOD PRESSURE: 79 MMHG

## 2024-02-15 DIAGNOSIS — I10 ESSENTIAL HYPERTENSION: Primary | ICD-10-CM

## 2024-02-15 DIAGNOSIS — R06.02 SOB (SHORTNESS OF BREATH) ON EXERTION: ICD-10-CM

## 2024-02-15 DIAGNOSIS — E78.00 PURE HYPERCHOLESTEROLEMIA: ICD-10-CM

## 2024-02-15 DIAGNOSIS — R42 DIZZINESS ON STANDING: ICD-10-CM

## 2024-02-15 PROCEDURE — 4004F PT TOBACCO SCREEN RCVD TLK: CPT | Performed by: INTERNAL MEDICINE

## 2024-02-15 PROCEDURE — 3017F COLORECTAL CA SCREEN DOC REV: CPT | Performed by: INTERNAL MEDICINE

## 2024-02-15 PROCEDURE — 99214 OFFICE O/P EST MOD 30 MIN: CPT | Performed by: INTERNAL MEDICINE

## 2024-02-15 PROCEDURE — 3078F DIAST BP <80 MM HG: CPT | Performed by: INTERNAL MEDICINE

## 2024-02-15 PROCEDURE — G8417 CALC BMI ABV UP PARAM F/U: HCPCS | Performed by: INTERNAL MEDICINE

## 2024-02-15 PROCEDURE — G8484 FLU IMMUNIZE NO ADMIN: HCPCS | Performed by: INTERNAL MEDICINE

## 2024-02-15 PROCEDURE — 3077F SYST BP >= 140 MM HG: CPT | Performed by: INTERNAL MEDICINE

## 2024-02-15 PROCEDURE — G8427 DOCREV CUR MEDS BY ELIG CLIN: HCPCS | Performed by: INTERNAL MEDICINE

## 2024-02-15 NOTE — PATIENT INSTRUCTIONS
Your Nurse today: Karuna  Your Medical Assistant today: Purvi                Your Provider today: Dr. Velez           You may receive a survey regarding the care you received during your visit.  Your input is valuable to us.  We encourage you to complete and return your survey.  We hope you will choose us in the future for your healthcare needs.

## 2024-02-15 NOTE — PROGRESS NOTES
DX  ASSESSMENT:  1.  Atypical chest pain, appears to be more musculoskeletal with chest  wall tenderness.  2.  COPD.  3.  Hypertension.  4.  Hyperlipidemia.  5.  History of diabetes.       Plan     The current meds and labs reviewed    Hx of Left shoulder marked tenderness and pain - resolved  Ekg wnl  Trop neg  Appears to msk  Echo and Lexisc nuc- WNL  Asa 81   cont F/u with pain management     Hx of  Balance issue   Hx of Dizziness on standing- much better  No fall  Bedside ortho negative   Hx of Low normal BP resolved after Decrease lisinopril/hctz 20/25- to 1/2 tab po qd  Hydration advised at least 64 oz    Dizziness better with med adjustement    Continue the current treatment and with constant vigilance to changes in symptoms and also any potential side effects.  Return for care or seek medical attention immediately if symptoms got worse and/or develop new symptoms.    Hypertension, on medical treatment. Seems to be under good control. Patient is compliant with medical treatment.   Home /80  Hx of Sinus tachycardia  Cont toprol xl 50     D/w the pat the plan of care   Hyperlipidemia: on statins, followed periodically. Patient need periodic lipid and liver profile.  PCP FOLLOW lp AND lft PER PAT    Smoking: discussed with the patient the importance of smoke cessation especially with the risk of CAD.    Lipid panel and liver function test before next appointment    Hx of low k and on kcl corrected      D/w the pat the plan of care    Lab prior to next visit    Discussed use, benefit, and side effects of prescribed medications. All patient questions answered. Pt voiced understanding. Instructed to continue current medications, diet and exercise. Continue risk factor modification and medical management. Patient agreed with treatment plan. Follow up as directed.      The patient is advised to attempt to improve diet and exercise patterns to aid in medical management of this problem.  Advised more plant based

## 2024-02-20 NOTE — PROGRESS NOTES
Physical Therapy Treatment Note     Name: Cher GARCIA [facility] Number: [id number]    Therapy Diagnosis:        Encounter Diagnoses   Name Primary?    Weakness Yes    Stiffness of right knee        Physician: Geovany Vega MD    Visit Date: 2/20/2024    Physician Orders: PT Eval and Treat  Medical Diagnosis from Referral: s/p R ACL-R w/ QT autograft and lateral meniscus repair  Evaluation Date: 11/22/2023  Authorization Period Expiration: 2/8/24  Plan of Care Expiration: 3/22/24  Visit # / Visits authorized: 16/32   FOTO: 56 at IE--FOTO DUE 1/25/24   54 at visit 8 (12/18/23)   59 at visit 12 (1/11/24)    PTA Visit #: 1/5    Time In: 2:30 PM  Time Out:   3:15 PM  Total Billable Time: 41 individual min billed (4 non billed)    Precautions: Standard  Functional Level Prior to Evaluation: Chronic limitation from injury prior to surgery    Subjective     Pt reports: knee doing good without pain or problems    She was compliant with home exercise program.  Response to previous treatment: not much change  Functional change: ambulating independent w/out brace or assistive device     Pain: 0/10  Location: Diffuse R knee     Objective     0 deg resting extension and no lag w/ SLR..  130 deg flexion.       Treatment     Cher received therapeutic exercises to develop strength, endurance, ROM, flexibility, and posture for 0 individual minutes including:  -  (Not Today)  Heel Drop stretch--1 min x 2  Calf Raise--3 x 10   Maria Isabel stretch--throughout session  HS Curls--5 plates--3 x 10  HS stretch with strap--10 x 10 sec  Supine ball flexion rolls [id number]  Prone Quad Sets [id number]  Slant Board Stretch [id number]  Prone quad stretch--3 x 30 sec  Cher received the following manual therapy techniques: Joint mobilizations, Myofacial release, and Soft tissue Mobilization were applied to the: R knee for  0 minutes, including:    Cher participated in neuromuscular re-education activities to improve: Balance, Coordination,  Chief Complaint   Patient presents with    1 Year Follow Up    Hypertension    Check-Up       Pat sent  pcp for mild cardiomegaly and lAE reported on CXR done dec 2020      Pt here for a 1 yr f/u    EKG done today    Denied  Chest pain, dizzinesss, palpitation or edema    Sob on exertion    Hx of Left shoulder pain and worse with moving the left arm -had stress test at [facility] and was informed negative per pat  HX OF BURSITIS      Record reviewed      Patient Active Problem List   Diagnosis    Nonobstructive CAD (coronary artery disease)    Asthma    Diabetes mellitus ([facility] [address]    High cholesterol    Wheezing    Fatigue    Joint pain    Arthritis    FH: CAD (coronary artery disease)    Tobacco abuse    Pseudoaneurysm (HCC)    Costochondritis    Bell's palsy    Neck pain    Thyroid nodule    Nicotine abuse    SOB (shortness of breath)    Multinodular goiter    COPD with acute exacerbation (HCC)    Palpitation    Pure hypercholesterolemia    Essential hypertension    Anxiety    Low back pain    Degeneration of lumbar intervertebral disc    Lumbosacral radiculopathy    SOB (shortness of breath) on exertion       Past Surgical History:   Procedure Laterality Date    ANKLE FRACTURE SURGERY Right 05/27/2021    BACK SURGERY  12/05/2016    CARDIOVASCULAR STRESS TEST  10 02 2009    Gated SPECT LV function demonstrated normal thickening, normal contractility, normal wall motion, EF of 46% which is low normal; however, visualized by myself appears to be 55%. No evidence of prior transmural MI or ischemia. Excellend treadmill exercise. Completed 10 METS, functional class I. No evidence of ischemic -induced EKG changes are noted. Appropriate hemodynamic response to exercise. COLONOSCOPY      COLONOSCOPY N/A 2/13/2019    COLONOSCOPY POLYPECTOMY SNARE/COLD BIOPSY performed by Lyric Aguilera MD at [address] [place] [address] CATH LAB PROCEDURE  10 12 2009    LV demonstrates normal systolic function, EF 72%.  No critical "Kinesthetic, Sense, Proprioception, and Posture for   27 individual minutes. The following activities were included:  Fwd Step Downs 5x5 from 6" step  Seated Ext-2 plate--SL--3 x 5 - very challenging  SLR--3 x 10  Forward step ups--8"--3 x 5  Split Squats w/ UE assist in rack 3x5 each  Lateral eccentric lowering--6"--3 x 5    (Not Today)  Wall sits--10 sec x 5  Heel with QS and shift--10 x 10 sec  Prone HS Curls--2 plates--3 x 10  SLRDL (wt touch)-- x 15 each hand  DL Leg Press 3 x 10 - 8 plates  SL Leg Press 3x10 - 5 plates   Long Bridging [id number]  Planks [id number]  Wall Ball Squats 3x15  RFESS--3 x 5 --with balance assist    Therapeutic Activity for  14 individual minutes including:  Upright Bike x 8 min  LSI  Y Balance Test    (Not Today)  Reverse lunges TRX x 10 each side  TRX squats to chair 3 x 10 - cueing to not shift weight to the left      Cher received the following supervised modalities after being cleared for contradictions: NMES Electrical Stimulation:  Cher received NMES Electrical Stimulation to elicit muscle contraction of the R Quad. Pt received stimulation at a rate of 50 pps with symmetric current, ramp of 2 seconds with 10 second on time and 20 second off time. Performed for  0 min w/ 8 min in propped extension with MHP and 8# cuff weight followed by 4 min SLR.  Patient tolerated treatment well without any adverse effects.     Home Exercises Provided and Patient Education Provided     Education provided: Review of HEP and need to get knee fully extended    Written Home Exercises Provided: Patient instructed to cont prior HEP.  Exercises were reviewed and Cher was able to demonstrate them prior to the end of the session.  Cher demonstrated good  understanding of the education provided.     See EMR under Patient Instructions for exercises provided prior visit.    Assessment     Still having issues with transportation.  She arrived with 0 resting extension and no quad lag with " lesions are noted in all larger pericardial vessels & show that left main is widely patent. Circumflex is widely patent with anterior marginal one & two being widely patent. Left anterior descending artery is widely patent, diagonal one large caliber vessel, widely patent. Right coronary artery is widely patent & is dominant. DOPPLER ECHOCARDIOGRAPHY  10 02 2009    LV demonstrated normal thickening, normal contractility, normal motion with mild systolic dilatation of the ventricle noted. EF 60%. Left atrium is mildly dilated. There is stage I diastolic dysfunction. PARTIAL HYSTERECTOMY (CERVIX NOT REMOVED)  1999    ROTATOR CUFF REPAIR Bilateral 2007       Allergies   Allergen Reactions    Morphine Itching        Family History   Problem Relation Age of Onset    Heart Disease Mother     Hypertension Mother     High Cholesterol Mother     Pacemaker Mother     Heart Defect Brother     Heart Defect Brother     Heart Disease Sister 43        Sister had CABG. Heart Disease Sister 55        Another sister had CABG. Social History     Socioeconomic History    Marital status: Single     Spouse name: Not on file    Number of children: Not on file    Years of education: Not on file    Highest education level: Not on file   Occupational History    Not on file   Tobacco Use    Smoking status: Every Day     Packs/day: 0.75     Years: 35.00     Pack years: 26.25     Types: Cigarettes    Smokeless tobacco: Never   Substance and Sexual Activity    Alcohol use: Yes     Alcohol/week: 0.0 standard drinks     Comment: Patient states, \"occasionally. \"    Drug use: No    Sexual activity: Yes     Partners: Male   Other Topics Concern    Not on file   Social History Narrative    Not on file     Social Determinants of Health     Financial Resource Strain: Not on file   Food Insecurity: Not on file   Transportation Needs: Not on file   Physical Activity: Not on file   Stress: Not on file   Social Connections: Not on file "straight leg raise.  Did not utilize NMES today.  Continued working quad and LE strength. Did take her LSI and Y balance Test.  94% symmetry with Y balance test.  Strength in R quad is too weak to think about running.  Educated on results and need to get in PT to increase strength to return to running.  Will continue strengthening and add pre plyo activities next session.  Cont POC.      Cher Is progressing well towards her goals.   Pt prognosis is Good.     Pt will continue to benefit from skilled outpatient physical therapy to address the deficits listed in the problem list box on initial evaluation, provide pt/family education and to maximize pt's level of independence in the home and community environment.     Pt's spiritual, cultural and educational needs considered and pt agreeable to plan of care and goals.     Anticipated barriers to physical therapy: none    Goals:  Short Term Goals: 6 weeks   Pt will be independent with HEP for continued progression beyond skilled therapy.--Compliant--MET  Pt will be able to ambulate I without deviation or quad avoidance gait on R.--minimal quad avoidance--PROGRESSING  Pt will be able to negotiate stairs reciprocally without pain or deviation on R.--able to go up/down reciprocally but still has some slight decrease in eccentric control and pain when descending on right   Pt will be able to perform an SLR on R without lag to demonstrate improved terminal knee extension control.--MET     Long Term Goals: 12 weeks   Pt will be able to perform a lateral step down from an 8" step without pain or limitation from R knee. IMPROVING - able from 6" step  Pt will be able to return to running activities without pain or limitation from R knee. NOT MET  Pt will be independent with strengthening activities in the gym on her own for safe discharge. NOT MET  Pt will be able to perform a prone heel to butt without increased pain in R knee. Prone knee flexion on right lower extremity " Intimate Partner Violence: Not on file   Housing Stability: Not on file       Current Outpatient Medications   Medication Sig Dispense Refill    metoprolol succinate (TOPROL XL) 25 MG extended release tablet TAKE 1 TABLET BY MOUTH EVERY DAY 90 tablet 3    pregabalin (LYRICA) 150 MG capsule Take 150 mg by mouth 2 times daily. Magnesium 80 MG TABS Take by mouth      TRAMADOL HCL ER PO Take by mouth. Flaxseed, Linseed, (FLAX SEED OIL) 1000 MG CAPS Flaxseed Oil Active 1000 MG PO Daily May 26th, 2021 11:37am      Cholecalciferol (VITAMIN D3) 125 MCG (5000 UT) CHEW Take by mouth      Zinc Sulfate (ZINC 15 PO) Take by mouth      blood glucose test strips (FREESTYLE LITE) strip Testing once daily 300 strip 5    HYDROcodone-acetaminophen (NORCO) 5-325 MG per tablet       lisinopril-hydroCHLOROthiazide (PRINZIDE;ZESTORETIC) 20-25 MG per tablet       loratadine (CLARITIN) 10 MG tablet TAKE 1 TABLET BY MOUTH EVERY DAY      metoprolol succinate (TOPROL XL) 50 MG extended release tablet Take 1 tablet by mouth daily 90 tablet 3    acetaminophen (APAP EXTRA STRENGTH) 500 MG tablet Take 1 tablet by mouth every 6 hours as needed for Pain 120 tablet 0    albuterol sulfate HFA (PROVENTIL;VENTOLIN;PROAIR) 108 (90 Base) MCG/ACT inhaler Inhale 2 puffs into the lungs every 6 hours as needed for Wheezing      potassium chloride (KLOR-CON M) 20 MEQ extended release tablet Take 0.5 tablets by mouth daily 60 tablet 3    buPROPion (WELLBUTRIN XL) 300 MG extended release tablet Take 300 mg by mouth every morning      carboxymethylcellulose 1 % ophthalmic solution Place 1-2 drops into both eyes as needed for Dry Eyes      amlodipine (NORVASC) 5 MG tablet Take 5 mg by mouth daily. sitaGLIPtan-metFORMIN (JANUMET)  MG per tablet Take 1 tablet by mouth 2 times daily. pravastatin (PRAVACHOL) 40 MG tablet Take 40 mg by mouth daily        No current facility-administered medications for this visit.        Review of Systems -     General ROS: negative  Psychological ROS: negative  Hematological and Lymphatic ROS: No history of blood clots or bleeding disorder. Respiratory ROS: no cough,  or wheezing, the rest see HPI  Cardiovascular ROS: See HPI  Gastrointestinal ROS: negative  Genito-Urinary ROS: no dysuria, trouble voiding, or hematuria  Musculoskeletal ROS: negative  Neurological ROS: no TIA or stroke symptoms  Dermatological ROS: negative      Blood pressure 129/80, pulse 89, height 5' 6\" (1.676 m), weight 189 lb 3.2 oz (85.8 kg), not currently breastfeeding. Physical Examination:    General appearance - alert, well appearing, and in no distress  HEENT- Pink conjunctiva  , Non-icteri sclera,PERRLA  Mental status - alert, oriented to person, place, and time  Neck - supple, no significant adenopathy, no JVD, or carotid bruits  Chest - clear to auscultation, no wheezes, rales or rhonchi, symmetric air entry  Heart - normal rate, regular rhythm, normal S1, S2, no murmurs, rubs, clicks or gallops  Abdomen - soft, nontender, nondistended, no masses or organomegaly  CARLENE- no CVA or flank tenderness, no suprapubic tenderness  Neurological - alert, oriented, normal speech, no focal findings or movement disorder noted  Musculoskeletal/limbs - no joint tenderness, deformity or swelling   - peripheral pulses normal, no pedal edema, no clubbing or cyanosis  Skin - normal coloration and turgor, no rashes, no suspicious skin lesions noted  Psych- appropriate mood and affect    Lab  No results for input(s): CKTOTAL, CKMB, CKMBINDEX, TROPONINI in the last 72 hours.   CBC:   Lab Results   Component Value Date/Time    WBC 7.0 01/27/2022 10:20 AM    RBC 4.45 01/27/2022 10:20 AM    RBC 4.46 11/11/2011 06:38 PM    HGB 13.8 01/27/2022 10:20 AM    HCT 40.4 01/27/2022 10:20 AM    MCV 90.8 01/27/2022 10:20 AM    MCH 31.0 01/27/2022 10:20 AM    MCHC 34.2 01/27/2022 10:20 AM    RDW 13.2 05/25/2018 08:37 AM     01/27/2022 10:20 AM    MPV 9.3 equal to left   Pt will be able to perform a bodyweight squat without pain or limitation from R knee. Able to do the squat but requires cueing for correct form as she lets knees go forward over toes and still shifts away from right lower extremity - also still has some mild pain with this    Plan      Updated Certification Period: 2/8/2024 to 3/22/2024  Recommended Treatment Plan: 2 times per week for 6 weeks: Electrical Stimulation NMES, Gait Training, Manual Therapy, Moist Heat/ Ice, Neuromuscular Re-ed, Patient Education, Therapeutic Activities, and Therapeutic Exercise       Jose Espinoza, PTA  2/20/2024         01/27/2022 10:20 AM     BMP:    Lab Results   Component Value Date/Time     01/27/2022 10:20 AM    K 4.0 01/27/2022 10:20 AM    K 3.8 05/22/2021 09:50 PM     01/27/2022 10:20 AM    CO2 27 01/27/2022 10:20 AM    BUN 6 01/27/2022 10:20 AM    LABALBU 4.0 12/25/2020 09:35 AM    LABALBU 4.4 10/27/2011 10:30 AM    CREATININE 0.7 01/27/2022 10:20 AM    CALCIUM 9.3 01/27/2022 10:20 AM    LABGLOM >90 01/27/2022 10:20 AM    GLUCOSE 139 01/27/2022 10:20 AM    GLUCOSE 100 10/30/2011 05:01 AM     Hepatic Function Panel:    Lab Results   Component Value Date/Time    ALKPHOS 92 12/25/2020 09:35 AM    ALT 28 12/25/2020 09:35 AM    AST 28 12/25/2020 09:35 AM    PROT 6.4 12/25/2020 09:35 AM    BILITOT 0.3 12/25/2020 09:35 AM    BILIDIR <0.2 09/07/2019 07:35 PM    LABALBU 4.0 12/25/2020 09:35 AM    LABALBU 4.4 10/27/2011 10:30 AM     Magnesium:    Lab Results   Component Value Date/Time    MG 2.0 01/27/2022 10:20 AM     Warfarin PT/INR:  No components found for: PTPATWAR, PTINRWAR  HgBA1c:    Lab Results   Component Value Date/Time    LABA1C 7.5 08/24/2022 11:11 AM    LABA1C 7.7 01/27/2022 10:20 AM     FLP:    Lab Results   Component Value Date/Time    TRIG 74 01/27/2022 10:20 AM    HDL 44 01/27/2022 10:20 AM    LDLCALC 61 01/27/2022 10:20 AM     TSH:    Lab Results   Component Value Date/Time    TSH 0.620 08/23/2018 11:24 AM      She had a cardiac catheterization in 2009 and normal LV function and no significant  obstructive lesion. Widely patent and dominant RCA; diagonal large  caliber, widely patent; LAD, widely patent; large vessel and left  circumflex, widely patent. So basically, all the arteries are widely  patent in the cardiac catheterization then. Conclusions      Summary   Lexiscan EKG stress test is not suggestive for ischemia. This Nuclear Medicine study was negative for ischemia .       Signatures      ----------------------------------------------------------------   Electronically signed by Olen Scheuermann, Trudy SORENSON (Interpreting   Cardiologist) on 01/09/2019 at 15:29   ----------------------------------------------------------------       Conclusions      Summary   Normal left ventricle size and Low Normal LV systolic function. Ejection   fraction was estimated at 50 %. There were no regional left ventricular   wall motion abnormalities and wall thickness was within normal limits. The left atrium is Mildly dilated. Signature      ----------------------------------------------------------------   Electronically signed by Idalia Palm MD (Interpreting   physician) on 01/09/2019 at 09:23 PM   ----------------------------------------------------------------    Stable chest x-ray no interval change since previous study dated 24 December 2020.   2. Mild cardiomegaly with possible left atrial enlargement   3. Otherwise negative chest x-ray. .                   This report has been created using voice recognition software. It may contain minor errors which are inherent in voice recognition technology. Final report electronically signed by DR Roldan Angeles on 12/25/2020 9:57 AM      Conclusions      Summary   Normal left ventricle size and systolic function. Ejection fraction was   estimated at 60 to 65 %. There were no regional left ventricular wall   motion abnormalities and wall thickness was within normal limits. Doppler parameters were consistent with abnormal left ventricular   relaxation (grade 1 diastolic dysfunction). Signature      ----------------------------------------------------------------   Electronically signed by Idalia Palm MD (Interpreting   physician) on 03/01/2021 at 07:03 PM   ----------------------------------------------------------------        ekg dec 2020  Normal sinus rhythm  Normal ECG  When compared with ECG of 24-DEC-2020 20:00,  No significant change was found    Ekg 8/26/22  Sinus  Rhythm   WITHIN NORMAL LIMITS      Assessment  Hx of sinus tachy   Diagnosis Orders   1. Essential hypertension  EKG 12 Lead      2. Pure hypercholesterolemia        3. SOB (shortness of breath) on exertion  EKG 12 Lead              Previous  admission DX  ASSESSMENT:  1. Atypical chest pain, appears to be more musculoskeletal with chest  wall tenderness. 2.  COPD. 3.  Hypertension. 4.  Hyperlipidemia. 5.  History of diabetes. Plan     The current meds and labs reviewed    Continue the current treatment and with constant vigilance to changes in symptoms and also any potential side effects. Return for care or seek medical attention immediately if symptoms got worse and/or develop new symptoms. Hypertension, on medical treatment. Seems to be under good control. Patient is compliant with medical treatment. Hx of Sinus tachycardia  Cont toprol xl 50     Sob on exertion left shoulder pain and tenderness  cxr possible cardiomegaly  Echo ef wnl    D/w the pat the plan of care   Hyperlipidemia: on statins, followed periodically. Patient need periodic lipid and liver profile. PCP FOLLOW lp AND lft PER PAT    Smoking: discussed with the patient the importance of smoke cessation especially with the risk of CAD. Lipid panel and liver function test before next appointment    Hx of low k and on kcl    Overall PAT IS Doing well and stable    D/w the pat the plan of care    Discussed use, benefit, and side effects of prescribed medications. All patient questions answered. Pt voiced understanding. Instructed to continue current medications, diet and exercise. Continue risk factor modification and medical management. Patient agreed with treatment plan. Follow up as directed.     RTC in  1 year      [facility]woodSt[facility]

## 2024-03-01 RX ORDER — ALBUTEROL SULFATE 90 UG/1
2 AEROSOL, METERED RESPIRATORY (INHALATION) EVERY 4 HOURS PRN
Qty: 90 G | OUTPATIENT
Start: 2024-03-01

## 2024-03-22 ENCOUNTER — HOSPITAL ENCOUNTER (EMERGENCY)
Age: 64
Discharge: HOME | End: 2024-03-22
Payer: MEDICARE

## 2024-03-22 ENCOUNTER — APPOINTMENT (OUTPATIENT)
Dept: GENERAL RADIOLOGY | Age: 64
End: 2024-03-22
Payer: MEDICARE

## 2024-03-22 VITALS
BODY MASS INDEX: 30.37 KG/M2 | TEMPERATURE: 97.4 F | SYSTOLIC BLOOD PRESSURE: 174 MMHG | HEIGHT: 66 IN | DIASTOLIC BLOOD PRESSURE: 68 MMHG | HEART RATE: 84 BPM | OXYGEN SATURATION: 98 % | WEIGHT: 189 LBS | RESPIRATION RATE: 20 BRPM

## 2024-03-22 DIAGNOSIS — J06.9 VIRAL URI WITH COUGH: Primary | ICD-10-CM

## 2024-03-22 LAB
FLUAV AG SPEC QL: NEGATIVE
FLUBV AG SPEC QL: NEGATIVE

## 2024-03-22 PROCEDURE — 71046 X-RAY EXAM CHEST 2 VIEWS: CPT

## 2024-03-22 PROCEDURE — 99213 OFFICE O/P EST LOW 20 MIN: CPT

## 2024-03-22 PROCEDURE — 99214 OFFICE O/P EST MOD 30 MIN: CPT

## 2024-03-22 PROCEDURE — 87804 INFLUENZA ASSAY W/OPTIC: CPT

## 2024-03-22 RX ORDER — LANOLIN ALCOHOL/MO/W.PET/CERES
400 CREAM (GRAM) TOPICAL DAILY
COMMUNITY

## 2024-03-22 RX ORDER — LORATADINE 10 MG
1 CAPSULE ORAL 4 TIMES DAILY PRN
Qty: 168 CAPSULE | Refills: 0 | Status: SHIPPED | OUTPATIENT
Start: 2024-03-22

## 2024-03-22 RX ORDER — BENZONATATE 100 MG/1
100 CAPSULE ORAL 3 TIMES DAILY PRN
Qty: 21 CAPSULE | Refills: 0 | Status: SHIPPED | OUTPATIENT
Start: 2024-03-22 | End: 2024-03-29

## 2024-03-22 RX ORDER — FLUTICASONE PROPIONATE 50 MCG
2 SPRAY, SUSPENSION (ML) NASAL DAILY
Qty: 16 G | Refills: 0 | Status: SHIPPED | OUTPATIENT
Start: 2024-03-22

## 2024-03-22 ASSESSMENT — PAIN - FUNCTIONAL ASSESSMENT: PAIN_FUNCTIONAL_ASSESSMENT: NONE - DENIES PAIN

## 2024-03-22 NOTE — DISCHARGE INSTRUCTIONS
Please hydrate well keeping urine clear/pale yellow and get plenty of rest, this is the best way to decrease severity and expedite resolution of viral symptoms.    We can attribute your current symptoms to a virus, antibiotics will not help address a virus so they are not indicated.  Unnecessary antibiotics will cause significant side effects including diarrhea and potential infections.  Please practice good hand hygiene to prevent spread of your illness, minimize interactions with others.    Okay to alternate Tylenol/Motrin every 3 hours to prevent body aches/pain.  For example, Tylenol at noon, Motrin at 3 PM and then Tylenol again at 6 PM…    Okay to return to work/school function as long as you are fever free without the use of Tylenol/Motrin for 24 hours and your symptoms are overall improving.    See your family doctor if symptoms fail to improve or sooner if they worsen, return to ER/urgent care for any other urgent/emergent medical concerns.    Hope you are feeling better soon!

## 2024-03-22 NOTE — ED PROVIDER NOTES
[facility] URGENT CARE      URGENT CARE     Pt Name: Grace Busch  MRN: [id number]  Birthdate 1960  Date of evaluation: 3/22/2024  Provider: LUMA Stover CNP    Urgent Care Encounter     CHIEF COMPLAINT       Chief Complaint   Patient presents with    Cough    Fatigue    Generalized Body Aches     HISTORY OF PRESENT ILLNESS   Grace Busch is a 64 y.o. female who presents to urgent care with chief complaint of \"cold symptoms,\" cough/head hurts and body aches.  Symptoms started Friday.  Treating with DayQuil/[facility] max and Tylenol without significant improvement.  Denies history of symptoms of severe.  Admits to diabetes that is not under control.    Denies fevers, nausea vomiting diarrhea.  Denies abdominal pain.  Concomitant chest pain she associates with her cough.  Denies burning urgency frequency of urination or any sick contacts with similar symptoms.  Admits to seeing her aunt in the hospital who was diagnosed with influenza/pneumonia when her symptoms started.  Denies bringing up phlegm \"it is all congested in my chest.\"  Denies shortness of breath.  Denies sore throat or ear pain.    Admits to history of COVID infection she states this is not what it feels like.  Admits to history of influenza infection and states \"it feels like it might be (flu).\"    Patient admits to daily smoking, long history.  States she is decreased about she is smoking while she is ill but does persistently smoke daily    History obtained from patient  URGENT CARE TIMELINE      ED Course as of 03/22/24 1441   Fri Mar 22, 2024   1357 Temp: 97.4 °F (36.3 °C)  Vital signs are stable.  Oxygenating well.  Afebrile. [JR]   1358 Prior medical history of COPD, DM, tobacco use, Bell's palsy [JR]   1411 Discussed decreasing likelihood of a true positive test of influenza after 5 to 6 days of treatment.  Patient request influenza testing in spite of this considering she had close contact it was

## 2024-03-22 NOTE — ED TRIAGE NOTES
Symptoms started last Friday.  Has been taking dayquil at home for symptoms.  She states she continues to feel ill.  Cough is keeping her up at night.

## 2024-05-03 ENCOUNTER — HOSPITAL ENCOUNTER (EMERGENCY)
Age: 64
Discharge: HOME | End: 2024-05-03
Attending: STUDENT IN AN ORGANIZED HEALTH CARE EDUCATION/TRAINING PROGRAM
Payer: MEDICARE

## 2024-05-03 ENCOUNTER — APPOINTMENT (OUTPATIENT)
Dept: CT IMAGING | Age: 64
End: 2024-05-03
Payer: MEDICARE

## 2024-05-03 VITALS
OXYGEN SATURATION: 97 % | HEART RATE: 89 BPM | TEMPERATURE: 98.1 F | BODY MASS INDEX: 29.73 KG/M2 | RESPIRATION RATE: 18 BRPM | SYSTOLIC BLOOD PRESSURE: 162 MMHG | DIASTOLIC BLOOD PRESSURE: 81 MMHG | HEIGHT: 66 IN | WEIGHT: 185 LBS

## 2024-05-03 DIAGNOSIS — J18.9 PNEUMONIA OF LEFT LOWER LOBE DUE TO INFECTIOUS ORGANISM: Primary | ICD-10-CM

## 2024-05-03 LAB
ALBUMIN SERPL BCG-MCNC: 3.7 G/DL (ref 3.5–5.1)
ALP SERPL-CCNC: 117 U/L (ref 38–126)
ALT SERPL W/O P-5'-P-CCNC: 32 U/L (ref 11–66)
ANION GAP SERPL CALC-SCNC: 15 MEQ/L (ref 8–16)
AST SERPL-CCNC: 31 U/L (ref 5–40)
BASOPHILS ABSOLUTE: 0.1 THOU/MM3 (ref 0–0.1)
BASOPHILS NFR BLD AUTO: 0.6 %
BILIRUB SERPL-MCNC: 0.4 MG/DL (ref 0.3–1.2)
BILIRUB UR QL STRIP.AUTO: NEGATIVE
BUN SERPL-MCNC: 7 MG/DL (ref 7–22)
CALCIUM SERPL-MCNC: 9.2 MG/DL (ref 8.5–10.5)
CHARACTER UR: CLEAR
CHLORIDE SERPL-SCNC: 101 MEQ/L (ref 98–111)
CO2 SERPL-SCNC: 22 MEQ/L (ref 23–33)
COLOR: YELLOW
CREAT SERPL-MCNC: 0.6 MG/DL (ref 0.4–1.2)
DEPRECATED RDW RBC AUTO: 41.1 FL (ref 35–45)
EOSINOPHIL NFR BLD AUTO: 0.7 %
EOSINOPHILS ABSOLUTE: 0.1 THOU/MM3 (ref 0–0.4)
ERYTHROCYTE [DISTWIDTH] IN BLOOD BY AUTOMATED COUNT: 11.8 % (ref 11.5–14.5)
FLUAV RNA RESP QL NAA+PROBE: NOT DETECTED
FLUBV RNA RESP QL NAA+PROBE: NOT DETECTED
GFR SERPL CREATININE-BSD FRML MDRD: > 90 ML/MIN/1.73M2
GLUCOSE SERPL-MCNC: 158 MG/DL (ref 70–108)
GLUCOSE UR QL STRIP.AUTO: NEGATIVE MG/DL
HCT VFR BLD AUTO: 41.1 % (ref 37–47)
HGB BLD-MCNC: 13.9 GM/DL (ref 12–16)
HGB UR QL STRIP.AUTO: NEGATIVE
IMM GRANULOCYTES # BLD AUTO: 0.03 THOU/MM3 (ref 0–0.07)
IMM GRANULOCYTES NFR BLD AUTO: 0.3 %
KETONES UR QL STRIP.AUTO: ABNORMAL
LYMPHOCYTES ABSOLUTE: 2.7 THOU/MM3 (ref 1–4.8)
LYMPHOCYTES NFR BLD AUTO: 25.2 %
MCH RBC QN AUTO: 32.1 PG (ref 26–33)
MCHC RBC AUTO-ENTMCNC: 33.8 GM/DL (ref 32.2–35.5)
MCV RBC AUTO: 94.9 FL (ref 81–99)
MONOCYTES ABSOLUTE: 1.1 THOU/MM3 (ref 0.4–1.3)
MONOCYTES NFR BLD AUTO: 10 %
NEUTROPHILS ABSOLUTE: 6.9 THOU/MM3 (ref 1.8–7.7)
NEUTROPHILS NFR BLD AUTO: 63.2 %
NITRITE UR QL STRIP: NEGATIVE
NRBC BLD AUTO-RTO: 0 /100 WBC
OSMOLALITY SERPL CALC.SUM OF ELEC: 277 MOSMOL/KG (ref 275–300)
PH UR STRIP.AUTO: 5.5 [PH] (ref 5–9)
PLATELET # BLD AUTO: 235 THOU/MM3 (ref 130–400)
PMV BLD AUTO: 10.9 FL (ref 9.4–12.4)
POTASSIUM SERPL-SCNC: 3.6 MEQ/L (ref 3.5–5.2)
PROT SERPL-MCNC: 7 G/DL (ref 6.1–8)
PROT UR STRIP.AUTO-MCNC: NEGATIVE MG/DL
RBC # BLD AUTO: 4.33 MILL/MM3 (ref 4.2–5.4)
SARS-COV-2 RNA RESP QL NAA+PROBE: NOT DETECTED
SODIUM SERPL-SCNC: 138 MEQ/L (ref 135–145)
SP GR UR REFRACT.AUTO: 1.02 (ref 1–1.03)
UROBILINOGEN, URINE: 0.2 EU/DL (ref 0–1)
WBC # BLD AUTO: 10.9 THOU/MM3 (ref 4.8–10.8)
WBC #/AREA URNS HPF: NEGATIVE /[HPF]

## 2024-05-03 PROCEDURE — 99285 EMERGENCY DEPT VISIT HI MDM: CPT

## 2024-05-03 PROCEDURE — 85025 COMPLETE CBC W/AUTO DIFF WBC: CPT

## 2024-05-03 PROCEDURE — 6360000002 HC RX W HCPCS: Performed by: STUDENT IN AN ORGANIZED HEALTH CARE EDUCATION/TRAINING PROGRAM

## 2024-05-03 PROCEDURE — 2580000003 HC RX 258: Performed by: STUDENT IN AN ORGANIZED HEALTH CARE EDUCATION/TRAINING PROGRAM

## 2024-05-03 PROCEDURE — 6360000004 HC RX CONTRAST MEDICATION: Performed by: STUDENT IN AN ORGANIZED HEALTH CARE EDUCATION/TRAINING PROGRAM

## 2024-05-03 PROCEDURE — 81003 URINALYSIS AUTO W/O SCOPE: CPT

## 2024-05-03 PROCEDURE — 96374 THER/PROPH/DIAG INJ IV PUSH: CPT

## 2024-05-03 PROCEDURE — 74177 CT ABD & PELVIS W/CONTRAST: CPT

## 2024-05-03 PROCEDURE — 96361 HYDRATE IV INFUSION ADD-ON: CPT

## 2024-05-03 PROCEDURE — 36415 COLL VENOUS BLD VENIPUNCTURE: CPT

## 2024-05-03 PROCEDURE — 87636 SARSCOV2 & INF A&B AMP PRB: CPT

## 2024-05-03 PROCEDURE — 80053 COMPREHEN METABOLIC PANEL: CPT

## 2024-05-03 RX ORDER — 0.9 % SODIUM CHLORIDE 0.9 %
1000 INTRAVENOUS SOLUTION INTRAVENOUS ONCE
Status: COMPLETED | OUTPATIENT
Start: 2024-05-03 | End: 2024-05-03

## 2024-05-03 RX ORDER — KETOROLAC TROMETHAMINE 30 MG/ML
15 INJECTION, SOLUTION INTRAMUSCULAR; INTRAVENOUS ONCE
Status: COMPLETED | OUTPATIENT
Start: 2024-05-03 | End: 2024-05-03

## 2024-05-03 RX ORDER — AZITHROMYCIN 250 MG/1
TABLET, FILM COATED ORAL
Qty: 1 PACKET | Refills: 0 | Status: SHIPPED | OUTPATIENT
Start: 2024-05-03 | End: 2024-05-07

## 2024-05-03 RX ADMIN — SODIUM CHLORIDE 1000 ML: 9 INJECTION, SOLUTION INTRAVENOUS at 14:32

## 2024-05-03 RX ADMIN — KETOROLAC TROMETHAMINE 15 MG: 30 INJECTION, SOLUTION INTRAMUSCULAR at 14:33

## 2024-05-03 RX ADMIN — IOPAMIDOL 80 ML: 755 INJECTION, SOLUTION INTRAVENOUS at 14:46

## 2024-05-03 NOTE — DISCHARGE INSTR - COC
Isolation          Patient Infection Status       Infection Onset Added Last Indicated Last Indicated By Review Planned Expiration Resolved Resolved By    None active    Resolved    COVID-19 22 COVID-19 Rapid   22 Infection                        Nurse Assessment:  Last Vital Signs: BP (!) 162/81   Pulse 89   Temp 98.1 °F (36.7 °C)   Resp 18   Ht 1.676 m (5' 6\")   Wt 83.9 kg (185 lb)   SpO2 97%   BMI 29.86 kg/m²     Last documented pain score (0-10 scale):    Last Weight:   Wt Readings from Last 1 Encounters:   24 83.9 kg (185 lb)     Mental Status:  {IP PT MENTAL STATUS:}    IV Access:  { TELLO IV ACCESS:107884410}    Nursing Mobility/ADLs:  Walking   {CHP DME ADLs:972514618}  Transfer  {CHP DME ADLs:892104356}  Bathing  {CHP DME ADLs:729862012}  Dressing  {CHP DME ADLs:815926930}  Toileting  {CHP DME ADLs:441340284}  Feeding  {CHP DME ADLs:221396855}  Med Admin  {P DME ADLs:403622141}  Med Delivery   { TELLO MED Delivery:246219458}    Wound Care Documentation and Therapy:        Elimination:  Continence:   Bowel: {YES / NO:}  Bladder: {YES / NO:}  Urinary Catheter: {Urinary Catheter:494095676}   Colostomy/Ileostomy/Ileal Conduit: {YES / NO:}       Date of Last BM: ***  No intake or output data in the 24 hours ending 24 1547  No intake/output data recorded.    Safety Concerns:     { TELLO Safety Concerns:808954061}    Impairments/Disabilities:      { TELLO Impairments/Disabilities:918841447}    Nutrition Therapy:  Current Nutrition Therapy:   { TELLO Diet List:446847214}    Routes of Feeding: {CHP DME Other Feedings:226260711}  Liquids: {Slp liquid thickness:72585}  Daily Fluid Restriction: {CHP DME Yes amt example:049605347}  Last Modified Barium Swallow with Video (Video Swallowing Test): {Done Not Done Date:}    Treatments at the Time of Hospital Discharge:   Respiratory Treatments: ***  Oxygen Therapy:  {Therapy; copd

## 2024-05-03 NOTE — DISCHARGE INSTRUCTIONS
You are seen today in the emergency department for headache, generalized body pain, left-sided pain.  You are seen to have a pneumonia which is an infection in your lungs.  Worsening, antibiotics to treat this infection.  Follow-up with your family doctor regarding today visit

## 2024-05-03 NOTE — ED PROVIDER NOTES
TempSrc: Oral     SpO2: 95%  97%   Weight: 83.9 kg (185 lb)     Height: 1.676 m (5' 6\")         PROCEDURES: (None if blank)  Procedures:     CRITICAL CARE: (Please see Attending note / Attestation regarding Critical Care Time. )    Medical Decision Making     64-year-old female here for left-sided flank pain, headache, generalized bodyaches.  COVID and flu swabs were negative.  Patient was also complaining of left-sided flank pain with CVA tenderness.  CT abdomen pelvis with IV contrast done showing a left lower lobe pneumonia.  On repeat assessment of patient, patient does state that she has been coughing as well.  WBC 10.9.  At this time, will treat with antibiotics.  Advised to follow-up with PCP.  Patient agrees to plan.    FINAL IMPRESSION      1. Pneumonia of left lower lobe due to infectious organism          DISPOSITION/PLAN   Condition: condition: stable  Dispo: Discharge to home  DISPOSITION Decision To Discharge 05/03/2024 03:46:41 PM      Outpatient follow up (If applicable):  Meño Cárdenas MD  [address] [address] [phone]    In 1 week  for follow-up    The results of pertinent diagnostic studies and exam findings were discussed with the patient/surrogate. The patient’s provisional diagnosis and plan of care were discussed with the patient and present family who expressed understanding. Medications were reviewed and indications and risks of medications were discussed with the patient/family present. Strict return precautions and follow up instructions were discussed with the patient prior to discharge, with which the patient agrees.          DISCHARGE PRESCRIPTIONS: (None if blank)  Discharge Medication List as of 5/3/2024  3:39 PM        START taking these medications    Details   azithromycin (ZITHROMAX Z-LULA) 250 MG tablet Take 2 tablets (500 mg) on Day 1, and then take 1 tablet (250 mg) on days 2 through 5., Disp-1 packet, R-0Normal               This

## 2024-06-24 ENCOUNTER — OFFICE VISIT (OUTPATIENT)
Age: 64
End: 2024-06-24
Payer: MEDICARE

## 2024-06-24 VITALS
WEIGHT: 185.4 LBS | HEIGHT: 66 IN | SYSTOLIC BLOOD PRESSURE: 136 MMHG | BODY MASS INDEX: 29.8 KG/M2 | HEART RATE: 95 BPM | DIASTOLIC BLOOD PRESSURE: 74 MMHG

## 2024-06-24 DIAGNOSIS — E04.2 MULTINODULAR GOITER: Primary | ICD-10-CM

## 2024-06-24 PROCEDURE — 99214 OFFICE O/P EST MOD 30 MIN: CPT | Performed by: INTERNAL MEDICINE

## 2024-06-24 PROCEDURE — 3075F SYST BP GE 130 - 139MM HG: CPT | Performed by: INTERNAL MEDICINE

## 2024-06-24 PROCEDURE — 3017F COLORECTAL CA SCREEN DOC REV: CPT | Performed by: INTERNAL MEDICINE

## 2024-06-24 PROCEDURE — 1090F PRES/ABSN URINE INCON ASSESS: CPT | Performed by: INTERNAL MEDICINE

## 2024-06-24 PROCEDURE — G8417 CALC BMI ABV UP PARAM F/U: HCPCS | Performed by: INTERNAL MEDICINE

## 2024-06-24 PROCEDURE — 3078F DIAST BP <80 MM HG: CPT | Performed by: INTERNAL MEDICINE

## 2024-06-24 PROCEDURE — G8427 DOCREV CUR MEDS BY ELIG CLIN: HCPCS | Performed by: INTERNAL MEDICINE

## 2024-06-24 PROCEDURE — 4004F PT TOBACCO SCREEN RCVD TLK: CPT | Performed by: INTERNAL MEDICINE

## 2024-06-24 PROCEDURE — 1123F ACP DISCUSS/DSCN MKR DOCD: CPT | Performed by: INTERNAL MEDICINE

## 2024-06-24 PROCEDURE — G8400 PT W/DXA NO RESULTS DOC: HCPCS | Performed by: INTERNAL MEDICINE

## 2024-06-24 NOTE — PROGRESS NOTES
[facility] [place] [facility]  [facility]  [address]  [facility] [address]  Dept: [phone]  Loc: [phone]     Visit Date: 6/24/2024    Grace Busch is a 64 y.o. female who presents today for:  Chief Complaint   Patient presents with    Follow-up     Multinodular goiter   Thyroid nodules for years.    HPI     Grace Busch is a 64 y.o. , female who comes for f/u  for MNG.  This patient also has type 2 diabetes mellitus.  Referring provider: Meño Cárdenas MD  This patient has had multinodular goiter since 2014.  She has never had her thyroid biopsied.  The patient had a repeat ultrasound of the thyroid gland on 9/14/2023 which showed no changes.  She denies pain, choking and hoarseness of the voice.  Thyroid levels were checked on 2/28/24 and TSH came back normal 0.800.  There are no signs or symptoms of thyrotoxicosis.    Please note that this patient also has type 2 diabetes mellitus. The patient tells me that her blood sugars being managed by her PCP but she is also going to the diabetes management clinic.  She reports no problems with the feet. Had some blurry vision, saw Optometrist in 2/24, said everything was good and has f/u in 1yr.       Past Medical History:   Diagnosis Date    Arthritis     Asthma     Atypical chest pain     Atypical chest discomfort in hospital multiple times with stress test being normal.     Bell's palsy     COPD (chronic obstructive pulmonary disease) (HCC)     Costochondritis     Diabetes mellitus (HCC)     Fatigue     FH: CAD (coronary artery disease)     Strong family history of CAD.    High blood pressure     High cholesterol     Joint pain     Nonobstructive CAD (coronary artery disease)     Pseudoaneurysm (HCC)     Patient developed small pseudoaneurysm in right groin requiring thrombin injection by Dr. King which was successful.     Tobacco abuse     Tobacco abuse on Habitrol patch.    Wheezing       Past Surgical

## 2024-06-27 ENCOUNTER — HOSPITAL ENCOUNTER (OUTPATIENT)
Dept: GENERAL RADIOLOGY | Age: 64
Discharge: HOME | End: 2024-06-27
Payer: MEDICARE

## 2024-06-27 ENCOUNTER — HOSPITAL ENCOUNTER (OUTPATIENT)
Age: 64
Discharge: HOME | End: 2024-06-27
Payer: MEDICARE

## 2024-06-27 DIAGNOSIS — M75.112 INCOMPLETE TEAR OF LEFT ROTATOR CUFF, UNSPECIFIED WHETHER TRAUMATIC: ICD-10-CM

## 2024-06-27 PROCEDURE — 73030 X-RAY EXAM OF SHOULDER: CPT

## 2024-07-10 ENCOUNTER — HOSPITAL ENCOUNTER (OUTPATIENT)
Dept: ULTRASOUND IMAGING | Age: 64
Discharge: HOME | End: 2024-07-10
Attending: STUDENT IN AN ORGANIZED HEALTH CARE EDUCATION/TRAINING PROGRAM
Payer: MEDICARE

## 2024-07-10 DIAGNOSIS — E04.2 MULTINODULAR GOITER: ICD-10-CM

## 2024-07-10 PROCEDURE — 76536 US EXAM OF HEAD AND NECK: CPT

## 2024-09-16 ENCOUNTER — HOSPITAL ENCOUNTER (OUTPATIENT)
Dept: WOMENS IMAGING | Age: 64
Discharge: HOME | End: 2024-09-16
Payer: MEDICARE

## 2024-09-16 DIAGNOSIS — Z12.31 VISIT FOR SCREENING MAMMOGRAM: ICD-10-CM

## 2024-09-16 PROCEDURE — 77063 BREAST TOMOSYNTHESIS BI: CPT

## 2024-11-19 ENCOUNTER — OFFICE VISIT (OUTPATIENT)
Dept: CARDIOLOGY CLINIC | Age: 64
End: 2024-11-19
Payer: MEDICARE

## 2024-11-19 VITALS
WEIGHT: 187.2 LBS | SYSTOLIC BLOOD PRESSURE: 146 MMHG | DIASTOLIC BLOOD PRESSURE: 85 MMHG | HEIGHT: 66 IN | HEART RATE: 111 BPM | BODY MASS INDEX: 30.08 KG/M2

## 2024-11-19 DIAGNOSIS — I10 ESSENTIAL HYPERTENSION: Primary | ICD-10-CM

## 2024-11-19 DIAGNOSIS — R42 DIZZINESS ON STANDING: ICD-10-CM

## 2024-11-19 DIAGNOSIS — R06.02 SOB (SHORTNESS OF BREATH) ON EXERTION: ICD-10-CM

## 2024-11-19 DIAGNOSIS — E78.00 PURE HYPERCHOLESTEROLEMIA: ICD-10-CM

## 2024-11-19 PROCEDURE — 3077F SYST BP >= 140 MM HG: CPT | Performed by: INTERNAL MEDICINE

## 2024-11-19 PROCEDURE — 3079F DIAST BP 80-89 MM HG: CPT | Performed by: INTERNAL MEDICINE

## 2024-11-19 PROCEDURE — 99214 OFFICE O/P EST MOD 30 MIN: CPT | Performed by: INTERNAL MEDICINE

## 2024-11-19 PROCEDURE — 93000 ELECTROCARDIOGRAM COMPLETE: CPT | Performed by: INTERNAL MEDICINE

## 2024-11-19 PROCEDURE — 1123F ACP DISCUSS/DSCN MKR DOCD: CPT | Performed by: INTERNAL MEDICINE

## 2024-11-19 NOTE — PROGRESS NOTES
cardiomegaly with possible left atrial enlargement   3. Otherwise negative chest x-ray..                   This report has been created using voice recognition software. It may contain minor errors which are inherent in voice recognition technology.       Final report electronically signed by DR FRANK SWEENEY on 12/25/2020 9:57 AM      Conclusions      Summary   Normal left ventricle size and systolic function. Ejection fraction was   estimated at 60 to 65 %. There were no regional left ventricular wall   motion abnormalities and wall thickness was within normal limits.   Doppler parameters were consistent with abnormal left ventricular   relaxation (grade 1 diastolic dysfunction).      Signature      ----------------------------------------------------------------   Electronically signed by Trudy Velez MD (Interpreting   physician) on 03/01/2021 at 07:03 PM   ----------------------------------------------------------------     Conclusions      Summary   Normal left ventricle size and systolic function. Ejection fraction was   estimated at 55 %. There were no regional left ventricular wall motion   abnormalities and wall thickness was within normal limits.      Signature      ----------------------------------------------------------------   Electronically signed by Trudy Velez MD (Interpreting   physician) on 07/27/2023 at 09:07 PM    Conclusions      Summary   Lexiscan EKG stress test is not suggestive for ischemia.   The nuclear images is not suggestive for myocardial ischemia.      Signatures      ----------------------------------------------------------------   Electronically signed by Trudy Velez MD (Interpreting   Cardiologist) on 07/27/2023 at 21:41   ----------------------------------------------------------------    ekg dec 2020  Normal sinus rhythm  Normal ECG  When compared with ECG of 24-DEC-2020 20:00,  No significant change was found    Ekg 8/26/22  Sinus  Rhythm   WITHIN NORMAL LIMITS    Ekg

## 2025-01-06 ENCOUNTER — OFFICE VISIT (OUTPATIENT)
Age: 65
End: 2025-01-06
Payer: MEDICARE

## 2025-01-06 VITALS
HEART RATE: 101 BPM | HEIGHT: 66 IN | WEIGHT: 188.4 LBS | DIASTOLIC BLOOD PRESSURE: 78 MMHG | BODY MASS INDEX: 30.28 KG/M2 | SYSTOLIC BLOOD PRESSURE: 136 MMHG

## 2025-01-06 DIAGNOSIS — E04.2 MULTINODULAR GOITER: Primary | ICD-10-CM

## 2025-01-06 DIAGNOSIS — E11.65 TYPE 2 DIABETES MELLITUS WITH HYPERGLYCEMIA, WITHOUT LONG-TERM CURRENT USE OF INSULIN (HCC): ICD-10-CM

## 2025-01-06 PROCEDURE — 1123F ACP DISCUSS/DSCN MKR DOCD: CPT | Performed by: INTERNAL MEDICINE

## 2025-01-06 PROCEDURE — 3078F DIAST BP <80 MM HG: CPT | Performed by: INTERNAL MEDICINE

## 2025-01-06 PROCEDURE — 99215 OFFICE O/P EST HI 40 MIN: CPT | Performed by: INTERNAL MEDICINE

## 2025-01-06 PROCEDURE — 3075F SYST BP GE 130 - 139MM HG: CPT | Performed by: INTERNAL MEDICINE

## 2025-01-06 RX ORDER — METFORMIN HYDROCHLORIDE 500 MG/1
500 TABLET, EXTENDED RELEASE ORAL 2 TIMES DAILY
Qty: 60 TABLET | Refills: 5 | Status: SHIPPED | OUTPATIENT
Start: 2025-01-06

## 2025-01-06 RX ORDER — TIRZEPATIDE 2.5 MG/.5ML
INJECTION, SOLUTION SUBCUTANEOUS
COMMUNITY
Start: 2024-12-18

## 2025-01-06 NOTE — PROGRESS NOTES
2021 11:37am      Cholecalciferol (VITAMIN D3) 125 MCG (5000 UT) CHEW Take by mouth      Zinc Sulfate (ZINC 15 PO) Take by mouth      HYDROcodone-acetaminophen (NORCO) 5-325 MG per tablet       acetaminophen (APAP EXTRA STRENGTH) 500 MG tablet Take 1 tablet by mouth every 6 hours as needed for Pain 120 tablet 0    potassium chloride (KLOR-CON M) 20 MEQ extended release tablet Take 0.5 tablets by mouth daily 60 tablet 3    buPROPion (WELLBUTRIN XL) 300 MG extended release tablet Take 1 tablet by mouth every morning      carboxymethylcellulose 1 % ophthalmic solution Place 1-2 drops into both eyes as needed for Dry Eyes Systane      amlodipine (NORVASC) 5 MG tablet Take 1 tablet by mouth daily      sitaGLIPtan-metFORMIN (JANUMET)  MG per tablet Take 1 tablet by mouth 2 times daily      pravastatin (PRAVACHOL) 40 MG tablet Take 1 tablet by mouth daily       No current facility-administered medications for this visit.     Allergies   Allergen Reactions    Morphine Itching     Health Maintenance   Topic Date Due    HIV screen  Never done    Hepatitis C screen  Never done    Lung Cancer Screening &/or Counseling  10/29/2012    Shingles vaccine (2 of 3) 08/15/2014    Respiratory Syncytial Virus (RSV) Pregnant or age 60 yrs+ (1 - Risk 60-74 years 1-dose series) Never done    Depression Screen  05/22/2024    Flu vaccine (1) 08/01/2024    DTaP/Tdap/Td vaccine (2 - Td or Tdap) 08/18/2024    COVID-19 Vaccine (1 - 2023-24 season) Never done    Annual Wellness Visit (Medicare)  Never done    Pneumococcal 65+ years Vaccine (3 of 3 - PPSV23 or PCV20) 12/12/2024    A1C test (Diabetic or Prediabetic)  03/16/2025    GFR test (Diabetes, CKD 3-4, OR last GFR 15-59)  05/03/2025    Diabetic retinal exam  10/24/2025    Diabetic Alb to Cr ratio (uACR) test  12/16/2025    Lipids  12/16/2025    Diabetic foot exam  01/06/2026    Breast cancer screen  09/16/2026    Colorectal Cancer Screen  02/13/2029    DEXA (modify frequency per FRAX

## 2025-01-09 RX ORDER — METFORMIN HYDROCHLORIDE 500 MG/1
500 TABLET, EXTENDED RELEASE ORAL 2 TIMES DAILY
Qty: 180 TABLET | OUTPATIENT
Start: 2025-01-09

## 2025-02-07 NOTE — PROGRESS NOTES
Patient presents today for the 3rd visit without blood sugars. She is also anxious about her heart tests today and is worried about making it on time. Rescheduled diabetes appt for next week. Emphasized the need to bring her glucometer. Fair

## 2025-04-09 ENCOUNTER — OFFICE VISIT (OUTPATIENT)
Age: 65
End: 2025-04-09
Payer: MEDICARE

## 2025-04-09 VITALS
WEIGHT: 175.6 LBS | HEIGHT: 66 IN | DIASTOLIC BLOOD PRESSURE: 62 MMHG | BODY MASS INDEX: 28.22 KG/M2 | SYSTOLIC BLOOD PRESSURE: 110 MMHG | HEART RATE: 72 BPM

## 2025-04-09 DIAGNOSIS — E11.65 TYPE 2 DIABETES MELLITUS WITH HYPERGLYCEMIA, WITHOUT LONG-TERM CURRENT USE OF INSULIN (HCC): Primary | ICD-10-CM

## 2025-04-09 DIAGNOSIS — E78.2 MIXED HYPERLIPIDEMIA: ICD-10-CM

## 2025-04-09 DIAGNOSIS — E04.2 MULTINODULAR GOITER: ICD-10-CM

## 2025-04-09 PROCEDURE — 3078F DIAST BP <80 MM HG: CPT | Performed by: INTERNAL MEDICINE

## 2025-04-09 PROCEDURE — 1160F RVW MEDS BY RX/DR IN RCRD: CPT | Performed by: INTERNAL MEDICINE

## 2025-04-09 PROCEDURE — 1123F ACP DISCUSS/DSCN MKR DOCD: CPT | Performed by: INTERNAL MEDICINE

## 2025-04-09 PROCEDURE — 3074F SYST BP LT 130 MM HG: CPT | Performed by: INTERNAL MEDICINE

## 2025-04-09 PROCEDURE — 1159F MED LIST DOCD IN RCRD: CPT | Performed by: INTERNAL MEDICINE

## 2025-04-09 PROCEDURE — 3044F HG A1C LEVEL LT 7.0%: CPT | Performed by: INTERNAL MEDICINE

## 2025-04-09 PROCEDURE — 99214 OFFICE O/P EST MOD 30 MIN: CPT | Performed by: INTERNAL MEDICINE

## 2025-04-09 RX ORDER — CYCLOBENZAPRINE HCL 5 MG
TABLET ORAL
COMMUNITY
Start: 2025-04-03

## 2025-04-09 RX ORDER — TIRZEPATIDE 2.5 MG/.5ML
INJECTION, SOLUTION SUBCUTANEOUS
Qty: 2 ML | Refills: 3 | Status: SHIPPED | OUTPATIENT
Start: 2025-04-09

## 2025-04-09 RX ORDER — BACLOFEN 10 MG/1
TABLET ORAL
COMMUNITY
Start: 2025-04-03

## 2025-04-09 RX ORDER — SUMATRIPTAN 50 MG/1
TABLET, FILM COATED ORAL
COMMUNITY
Start: 2025-04-07

## 2025-04-09 RX ORDER — RIZATRIPTAN BENZOATE 10 MG/1
TABLET, ORALLY DISINTEGRATING ORAL
COMMUNITY
Start: 2025-04-03

## 2025-04-09 RX ORDER — CEPHALEXIN 500 MG/1
500 CAPSULE ORAL 2 TIMES DAILY
COMMUNITY
Start: 2025-02-24

## 2025-04-09 NOTE — PROGRESS NOTES
DVT  Pulses:2+ and symmetric  Skin:warm and dry, no hyperpigmentation, vitiligo, or suspicious lesions  Neuro:normal without focal findings, mental status, speech normal, alert and oriented x3, LEYLA, cranial nerves 2-12 intact, muscle tone and strength normal and symmetric.  Lymph nodes: There is no cervical, supraclavicular or submental adenopathy.  Musculoskeletal:  No joint swelling or deformity.  Psychiatry: Alert and oriented ×3.  Making good eye contact.  Affect is normal.        Assessment/Plan:   Diagnosis Orders   1. Type 2 diabetes mellitus with hyperglycemia, without long-term current use of insulin (HCC)  Improved based on A1c.  She unfortunately did not bring blood sugars but she is checking it a few times a week.  She has had mild but bearable GI symptoms with the Mounjaro.  Problem is that the insurance has been hesitant to cover it.  We will reorder and may have to appeal if it is denied.  With her response we will probably have to reduce the dose of the metformin or even stop it altogether.  She has lost some weight.  Patient is up-to-date on eye exam and there is no evidence of retinopathy.      2. Multinodular goiter  Asymptomatic and euthyroid.  Continue to monitor.      3. Mixed hyperlipidemia  Treated with 40 mg of pravastatin and this medication is well-tolerated.  We will monitor lipids and LFTs periodically.            No orders of the defined types were placed in this encounter.      The risks and benefits of my recommendations, as well as other treatment options were discussed with the patient today. Questions were answered.  Follow up: 3 months       Electronically signed by Allen Shi MD 4/9/2025 11:40 AM     **This report has been created using voice recognition software. It may   contain minor errors which are inherent in voice recognition technology.**

## 2025-05-19 ENCOUNTER — OFFICE VISIT (OUTPATIENT)
Dept: CARDIOLOGY CLINIC | Age: 65
End: 2025-05-19
Payer: MEDICARE

## 2025-05-19 VITALS
HEART RATE: 86 BPM | BODY MASS INDEX: 27.61 KG/M2 | HEIGHT: 66 IN | WEIGHT: 171.8 LBS | SYSTOLIC BLOOD PRESSURE: 131 MMHG | DIASTOLIC BLOOD PRESSURE: 68 MMHG

## 2025-05-19 DIAGNOSIS — R42 DIZZINESS ON STANDING: ICD-10-CM

## 2025-05-19 DIAGNOSIS — Z72.0 TOBACCO ABUSE: ICD-10-CM

## 2025-05-19 DIAGNOSIS — R06.02 SOB (SHORTNESS OF BREATH) ON EXERTION: ICD-10-CM

## 2025-05-19 DIAGNOSIS — E78.00 PURE HYPERCHOLESTEROLEMIA: ICD-10-CM

## 2025-05-19 DIAGNOSIS — I10 ESSENTIAL HYPERTENSION: Primary | ICD-10-CM

## 2025-05-19 PROCEDURE — 99214 OFFICE O/P EST MOD 30 MIN: CPT | Performed by: INTERNAL MEDICINE

## 2025-05-19 PROCEDURE — 1159F MED LIST DOCD IN RCRD: CPT | Performed by: INTERNAL MEDICINE

## 2025-05-19 PROCEDURE — 3078F DIAST BP <80 MM HG: CPT | Performed by: INTERNAL MEDICINE

## 2025-05-19 PROCEDURE — 1123F ACP DISCUSS/DSCN MKR DOCD: CPT | Performed by: INTERNAL MEDICINE

## 2025-05-19 PROCEDURE — 3075F SYST BP GE 130 - 139MM HG: CPT | Performed by: INTERNAL MEDICINE

## 2025-05-19 PROCEDURE — 1160F RVW MEDS BY RX/DR IN RCRD: CPT | Performed by: INTERNAL MEDICINE

## 2025-05-19 RX ORDER — UBROGEPANT 50 MG/1
50 TABLET ORAL PRN
COMMUNITY
Start: 2025-04-16

## 2025-05-19 RX ORDER — TOPIRAMATE 50 MG/1
50 TABLET, FILM COATED ORAL DAILY
COMMUNITY
Start: 2025-04-23

## 2025-05-19 NOTE — PROGRESS NOTES
Chief Complaint   Patient presents with    6 Month Follow-Up     6 month follow up.       Pat sent bu pcp for mild cardiomegaly and lAE reported on CXR done dec 2020    Hx of ER visit for ED 6/2023 for CP and left shoulder pain  Pat had rotator cuff- left  B/l shoulder surgery      ORTHOSTATIC BLOOD PRESSURES DONE  nov 2024      SUPINE         SITTING        STANDING  142/90 111      145/87 106     146/85 111  Hx of Dizziness on standing much better  after decreased  BP meds  No more Fall        6 month follow up.    Last EKG done on 11/19/2024.    Reports intermittent headaches,  and under work up by neurologist    Denies chest pain, palpitations, and dizziness.    Dizziness on standing much better    No more chest pain and still had left shoulder and back pain    Sob on exertion      Hx of lose of balance intermittent    Hx of Left shoulder pain and worse with moving the left arm -had stress test at [facility] and was informed negative per pat  HX OF BURSITIS    Under F/u with ENT doctor        Record reviewed    FHX  Mother had Mi at 60  Sister had  CMP  Sister 2- had mi  in early 50      Patient Active Problem List   Diagnosis    Nonobstructive CAD (coronary artery disease)    Asthma    Diabetes mellitus (HCC)    Fatigue    Joint pain    Arthritis    FH: CAD (coronary artery disease)    Tobacco abuse    Pseudoaneurysm    Costochondritis    Bell's palsy    Neck pain    Thyroid nodule    Nicotine abuse    Multinodular goiter    COPD with acute exacerbation (HCC)    Palpitation    Pure hypercholesterolemia    Essential hypertension    Anxiety    Low back pain    Degeneration of lumbar intervertebral disc    Lumbosacral radiculopathy    SOB (shortness of breath) on exertion    Family history of premature CAD    Dizziness on standing       Past Surgical History:   Procedure Laterality Date    ANKLE FRACTURE SURGERY Right 05/27/2021    BACK SURGERY  12/05/2016    CARDIOVASCULAR STRESS TEST  10/02/2009    Gated SPECT LV

## 2025-05-28 ENCOUNTER — CLINICAL DOCUMENTATION (OUTPATIENT)
Age: 65
End: 2025-05-28

## 2025-05-29 NOTE — PROGRESS NOTES
I spoke with patient in reference to a letter I received from her insurance [employer] in reference to statin therapy.  The patient is taking Pravachol 40 mg daily.  This will continue.

## 2025-06-02 DIAGNOSIS — E11.65 TYPE 2 DIABETES MELLITUS WITH HYPERGLYCEMIA, WITHOUT LONG-TERM CURRENT USE OF INSULIN (HCC): Primary | ICD-10-CM

## 2025-06-25 DIAGNOSIS — E11.65 TYPE 2 DIABETES MELLITUS WITH HYPERGLYCEMIA, WITHOUT LONG-TERM CURRENT USE OF INSULIN (HCC): ICD-10-CM

## 2025-06-25 RX ORDER — METFORMIN HYDROCHLORIDE 500 MG/1
500 TABLET, EXTENDED RELEASE ORAL 2 TIMES DAILY
Qty: 60 TABLET | Refills: 3 | Status: SHIPPED | OUTPATIENT
Start: 2025-06-25

## 2025-08-05 DIAGNOSIS — E11.65 TYPE 2 DIABETES MELLITUS WITH HYPERGLYCEMIA, WITHOUT LONG-TERM CURRENT USE OF INSULIN (HCC): ICD-10-CM

## 2025-08-05 RX ORDER — METFORMIN HYDROCHLORIDE 500 MG/1
500 TABLET, EXTENDED RELEASE ORAL 2 TIMES DAILY
Qty: 180 TABLET | Refills: 1 | Status: SHIPPED | OUTPATIENT
Start: 2025-08-05

## 2025-08-15 ENCOUNTER — HOSPITAL ENCOUNTER (OUTPATIENT)
Dept: OTHER | Age: 65
Discharge: HOME | End: 2025-08-15
Payer: MEDICARE

## 2025-08-15 ENCOUNTER — OFFICE VISIT (OUTPATIENT)
Age: 65
End: 2025-08-15
Payer: MEDICARE

## 2025-08-15 VITALS
WEIGHT: 170 LBS | SYSTOLIC BLOOD PRESSURE: 152 MMHG | BODY MASS INDEX: 27.32 KG/M2 | HEIGHT: 66 IN | DIASTOLIC BLOOD PRESSURE: 80 MMHG | HEART RATE: 79 BPM

## 2025-08-15 DIAGNOSIS — E78.2 MIXED HYPERLIPIDEMIA: ICD-10-CM

## 2025-08-15 DIAGNOSIS — E04.2 MULTINODULAR GOITER: ICD-10-CM

## 2025-08-15 DIAGNOSIS — E04.2 MULTINODULAR GOITER: Primary | ICD-10-CM

## 2025-08-15 DIAGNOSIS — E11.65 TYPE 2 DIABETES MELLITUS WITH HYPERGLYCEMIA, WITHOUT LONG-TERM CURRENT USE OF INSULIN (HCC): ICD-10-CM

## 2025-08-15 LAB
DEPRECATED MEAN GLUCOSE BLD GHB EST-ACNC: 135 MG/DL (ref 70–126)
HBA1C MFR BLD HPLC: 6.5 % (ref 4–6)
T4 FREE SERPL-MCNC: 1.1 NG/DL (ref 0.92–1.68)
TSH SERPL DL<=0.05 MIU/L-ACNC: 0.64 UIU/ML (ref 0.27–4.2)

## 2025-08-15 PROCEDURE — 84439 ASSAY OF FREE THYROXINE: CPT

## 2025-08-15 PROCEDURE — 1160F RVW MEDS BY RX/DR IN RCRD: CPT | Performed by: INTERNAL MEDICINE

## 2025-08-15 PROCEDURE — 3079F DIAST BP 80-89 MM HG: CPT | Performed by: INTERNAL MEDICINE

## 2025-08-15 PROCEDURE — 99214 OFFICE O/P EST MOD 30 MIN: CPT | Performed by: INTERNAL MEDICINE

## 2025-08-15 PROCEDURE — 1159F MED LIST DOCD IN RCRD: CPT | Performed by: INTERNAL MEDICINE

## 2025-08-15 PROCEDURE — 84443 ASSAY THYROID STIM HORMONE: CPT

## 2025-08-15 PROCEDURE — 3077F SYST BP >= 140 MM HG: CPT | Performed by: INTERNAL MEDICINE

## 2025-08-15 PROCEDURE — 3044F HG A1C LEVEL LT 7.0%: CPT | Performed by: INTERNAL MEDICINE

## 2025-08-15 PROCEDURE — 1123F ACP DISCUSS/DSCN MKR DOCD: CPT | Performed by: INTERNAL MEDICINE

## 2025-08-15 PROCEDURE — 36415 COLL VENOUS BLD VENIPUNCTURE: CPT

## 2025-08-15 PROCEDURE — 83036 HEMOGLOBIN GLYCOSYLATED A1C: CPT

## 2025-08-15 RX ORDER — HYDROCODONE BITARTRATE AND ACETAMINOPHEN 7.5; 325 MG/1; MG/1
TABLET ORAL
COMMUNITY
Start: 2025-07-31

## (undated) DEVICE — SNARE POLYP SM W13MMXL240CM SHTH DIA2.4MM OVL FLX DISP

## (undated) DEVICE — FORCEPS BX L240CM JAW DIA3.2MM L CAP W/ NDL MIC MESH TOOTH

## (undated) DEVICE — SET LNR RED GRN W/ BASE CLEANASCOPE

## (undated) DEVICE — SOLUTION IV 1000ML 0.45% SOD CHL PH 5 INJ USP VIAFLX PLAS

## (undated) DEVICE — TRAP POLYP ETRAP

## (undated) DEVICE — SET ADMIN 25ML L117IN PMP MOD CK VLV RLER CLMP 2 SMRTSITE

## (undated) DEVICE — ENDO KIT: Brand: MEDLINE INDUSTRIES, INC.

## (undated) DEVICE — IV START KIT: Brand: MEDLINE INDUSTRIES, INC.

## (undated) DEVICE — LINER SUCT CANSTR 1500CC SEMI RIG W/ POR HYDROPHOBIC SHUT

## (undated) DEVICE — TUBING IV STOPCOCK 48 CM 3 W

## (undated) DEVICE — CATHETER ETER IV 22GA L1IN POLYUR STR RADPQ INTROCAN SFTY

## (undated) DEVICE — CONNECTOR TBNG AUX H2O JET DISP FOR OLY 160/180 SER